# Patient Record
Sex: FEMALE | Race: WHITE | NOT HISPANIC OR LATINO | Employment: UNEMPLOYED | URBAN - METROPOLITAN AREA
[De-identification: names, ages, dates, MRNs, and addresses within clinical notes are randomized per-mention and may not be internally consistent; named-entity substitution may affect disease eponyms.]

---

## 2017-10-08 ENCOUNTER — HOSPITAL ENCOUNTER (EMERGENCY)
Facility: HOSPITAL | Age: 32
Discharge: HOME | End: 2017-10-08
Attending: EMERGENCY MEDICINE
Payer: MEDICAID

## 2017-10-08 VITALS
WEIGHT: 255 LBS | BODY MASS INDEX: 46.93 KG/M2 | SYSTOLIC BLOOD PRESSURE: 126 MMHG | TEMPERATURE: 98 F | OXYGEN SATURATION: 99 % | HEIGHT: 62 IN | DIASTOLIC BLOOD PRESSURE: 66 MMHG | RESPIRATION RATE: 20 BRPM | HEART RATE: 84 BPM

## 2017-10-08 DIAGNOSIS — K82.8 BILIARY DYSKINESIA: Primary | ICD-10-CM

## 2017-10-08 DIAGNOSIS — R10.11 RIGHT UPPER QUADRANT PAIN: ICD-10-CM

## 2017-10-08 LAB
ALBUMIN SERPL BCP-MCNC: 4.6 G/DL
ALP SERPL-CCNC: 79 U/L
ALT SERPL W/O P-5'-P-CCNC: 39 U/L
ANION GAP SERPL CALC-SCNC: 13 MMOL/L
AST SERPL-CCNC: 23 U/L
B-HCG UR QL: NEGATIVE
BACTERIA #/AREA URNS AUTO: ABNORMAL /HPF
BASOPHILS # BLD AUTO: 0.01 K/UL
BASOPHILS NFR BLD: 0.1 %
BILIRUB SERPL-MCNC: 1 MG/DL
BILIRUB UR QL STRIP: NEGATIVE
BUN SERPL-MCNC: 9 MG/DL
CALCIUM SERPL-MCNC: 9.6 MG/DL
CHLORIDE SERPL-SCNC: 105 MMOL/L
CLARITY UR REFRACT.AUTO: ABNORMAL
CO2 SERPL-SCNC: 24 MMOL/L
COLOR UR AUTO: ABNORMAL
CREAT SERPL-MCNC: 0.59 MG/DL
DIFFERENTIAL METHOD: ABNORMAL
EOSINOPHIL # BLD AUTO: 0.1 K/UL
EOSINOPHIL NFR BLD: 1.5 %
ERYTHROCYTE [DISTWIDTH] IN BLOOD BY AUTOMATED COUNT: 14.1 %
EST. GFR  (AFRICAN AMERICAN): >60 ML/MIN/1.73 M^2
EST. GFR  (NON AFRICAN AMERICAN): >60 ML/MIN/1.73 M^2
GLUCOSE SERPL-MCNC: 86 MG/DL
GLUCOSE UR QL STRIP: NEGATIVE
HCT VFR BLD AUTO: 43.5 %
HGB BLD-MCNC: 14.5 G/DL
HGB UR QL STRIP: NEGATIVE
KETONES UR QL STRIP: ABNORMAL
LEUKOCYTE ESTERASE UR QL STRIP: ABNORMAL
LIPASE SERPL-CCNC: 51 U/L
LYMPHOCYTES # BLD AUTO: 1.7 K/UL
LYMPHOCYTES NFR BLD: 17.4 %
MCH RBC QN AUTO: 30.2 PG
MCHC RBC AUTO-ENTMCNC: 33.3 G/DL
MCV RBC AUTO: 91 FL
MICROSCOPIC COMMENT: ABNORMAL
MONOCYTES # BLD AUTO: 0.8 K/UL
MONOCYTES NFR BLD: 8.6 %
NEUTROPHILS # BLD AUTO: 6.9 K/UL
NEUTROPHILS NFR BLD: 72.3 %
NITRITE UR QL STRIP: NEGATIVE
PH UR STRIP: 5 [PH] (ref 5–8)
PLATELET # BLD AUTO: 192 K/UL
PMV BLD AUTO: 12 FL
POTASSIUM SERPL-SCNC: 3.3 MMOL/L
PROT SERPL-MCNC: 8 G/DL
PROT UR QL STRIP: ABNORMAL
RBC # BLD AUTO: 4.8 M/UL
SODIUM SERPL-SCNC: 142 MMOL/L
SP GR UR STRIP: 1.02 (ref 1–1.03)
SQUAMOUS #/AREA URNS AUTO: 10 /HPF
URN SPEC COLLECT METH UR: ABNORMAL
UROBILINOGEN UR STRIP-ACNC: NEGATIVE EU/DL
WBC # BLD AUTO: 9.49 K/UL
WBC #/AREA URNS AUTO: 10 /HPF (ref 0–5)

## 2017-10-08 PROCEDURE — 96374 THER/PROPH/DIAG INJ IV PUSH: CPT

## 2017-10-08 PROCEDURE — 96375 TX/PRO/DX INJ NEW DRUG ADDON: CPT

## 2017-10-08 PROCEDURE — 25000003 PHARM REV CODE 250: Performed by: FAMILY MEDICINE

## 2017-10-08 PROCEDURE — 85025 COMPLETE CBC W/AUTO DIFF WBC: CPT

## 2017-10-08 PROCEDURE — 63600175 PHARM REV CODE 636 W HCPCS: Performed by: EMERGENCY MEDICINE

## 2017-10-08 PROCEDURE — 83690 ASSAY OF LIPASE: CPT

## 2017-10-08 PROCEDURE — 25000003 PHARM REV CODE 250: Performed by: EMERGENCY MEDICINE

## 2017-10-08 PROCEDURE — 99284 EMERGENCY DEPT VISIT MOD MDM: CPT | Mod: 25

## 2017-10-08 PROCEDURE — 96361 HYDRATE IV INFUSION ADD-ON: CPT

## 2017-10-08 PROCEDURE — 81000 URINALYSIS NONAUTO W/SCOPE: CPT

## 2017-10-08 PROCEDURE — 80053 COMPREHEN METABOLIC PANEL: CPT

## 2017-10-08 PROCEDURE — 81025 URINE PREGNANCY TEST: CPT

## 2017-10-08 RX ORDER — POTASSIUM CHLORIDE 20 MEQ/1
20 TABLET, EXTENDED RELEASE ORAL
Status: COMPLETED | OUTPATIENT
Start: 2017-10-08 | End: 2017-10-08

## 2017-10-08 RX ORDER — ONDANSETRON 2 MG/ML
4 INJECTION INTRAMUSCULAR; INTRAVENOUS
Status: COMPLETED | OUTPATIENT
Start: 2017-10-08 | End: 2017-10-08

## 2017-10-08 RX ORDER — TRAMADOL HYDROCHLORIDE 50 MG/1
50 TABLET ORAL EVERY 6 HOURS PRN
Qty: 12 TABLET | Refills: 0 | Status: SHIPPED | OUTPATIENT
Start: 2017-10-08 | End: 2017-10-18

## 2017-10-08 RX ORDER — OMEPRAZOLE 20 MG/1
20 CAPSULE, DELAYED RELEASE ORAL DAILY
Qty: 30 CAPSULE | Refills: 0 | Status: SHIPPED | OUTPATIENT
Start: 2017-10-08 | End: 2018-10-08

## 2017-10-08 RX ORDER — MORPHINE SULFATE 2 MG/ML
6 INJECTION, SOLUTION INTRAMUSCULAR; INTRAVENOUS
Status: COMPLETED | OUTPATIENT
Start: 2017-10-08 | End: 2017-10-08

## 2017-10-08 RX ADMIN — ONDANSETRON 4 MG: 2 INJECTION INTRAMUSCULAR; INTRAVENOUS at 06:10

## 2017-10-08 RX ADMIN — POTASSIUM CHLORIDE 20 MEQ: 20 TABLET, EXTENDED RELEASE ORAL at 08:10

## 2017-10-08 RX ADMIN — SODIUM CHLORIDE 1000 ML: 0.9 INJECTION, SOLUTION INTRAVENOUS at 06:10

## 2017-10-08 RX ADMIN — MORPHINE SULFATE 6 MG: 2 INJECTION, SOLUTION INTRAMUSCULAR; INTRAVENOUS at 06:10

## 2017-10-08 NOTE — ED PROVIDER NOTES
Encounter Date: 10/8/2017       History     Chief Complaint   Patient presents with    Flank Pain     Pt states started with abdominal pain since last pm, states now has pain to right flank that moves under right breast,  states unable to take deep breath.  Pt states + nausea and vomited x 1 PTA.      HPI   This is a 32 y.o. female who has no past medical history on file.   The patient presents to the Emergency Department with right sided abdominal pain.   Started this AM,  Gradual onset, constant, worsening. Pt cannot find comfortable position.  Pain radiates into lower ribs and right neck/shoulder pain.  Symptoms are associated with nausea/vomiting, .  Pt denies bloody urine, dysuria, hesitancy.   Symptoms are aggravated by breathing, sitting, leaning on the right side  Symptoms are relieved by nothing.   Patient has no prior history of similar symptoms.   Pt has a past surgical history that includes  section; Dilation and curettage of uterus; Tonsillectomy;   and Ganglion cyst excision.      Review of patient's allergies indicates:  No Known Allergies  History reviewed. No pertinent past medical history.  Past Surgical History:   Procedure Laterality Date     SECTION      DILATION AND CURETTAGE OF UTERUS      GANGLION CYST EXCISION      TONSILLECTOMY       Family History   Problem Relation Age of Onset    Diabetes Mother     Hypertension Mother     Depression Mother     Hypertension Father     Heart disease Father     Cancer Maternal Aunt     Diabetes Paternal Grandfather      Social History   Substance Use Topics    Smoking status: Current Some Day Smoker     Types: Cigarettes    Smokeless tobacco: Never Used      Comment: occ    Alcohol use Yes      Comment: occ     Review of Systems   Constitutional: Negative for fever.   HENT: Negative for sore throat.    Respiratory: Negative for shortness of breath.    Cardiovascular: Negative for chest pain.   Gastrointestinal: Negative for  nausea.   Genitourinary: Negative for dysuria.   Musculoskeletal: Negative for back pain.   Skin: Negative for rash.   Neurological: Negative for weakness.   Hematological: Does not bruise/bleed easily.       Physical Exam     Initial Vitals [10/08/17 1712]   BP Pulse Resp Temp SpO2   122/68 104 20 97.8 °F (36.6 °C) 100 %      MAP       86         Physical Exam    Nursing note and vitals reviewed.  Constitutional: She appears well-developed and well-nourished. She is not diaphoretic. She appears distressed (pain, anxious).   HENT:   Head: Normocephalic and atraumatic.   Mouth/Throat: Oropharynx is clear and moist.   Eyes: Conjunctivae are normal. Pupils are equal, round, and reactive to light.   Neck: Neck supple.   Cardiovascular: Normal rate, regular rhythm and intact distal pulses.   No murmur heard.  Pulmonary/Chest: Breath sounds normal. No respiratory distress. She has no wheezes. She has no rhonchi. She has no rales.   Abdominal: Soft. Bowel sounds are normal. She exhibits no distension. There is tenderness (RUQ). There is no guarding.   obese   Musculoskeletal: Normal range of motion. She exhibits no edema or tenderness.   Neurological: She is alert and oriented to person, place, and time.   Skin: Skin is warm and dry. Capillary refill takes less than 2 seconds. No rash noted.   Psychiatric:   Anxious         ED Course   Procedures  Labs Reviewed   URINALYSIS - Abnormal; Notable for the following:        Result Value    Appearance, UA Hazy (*)     Protein, UA Trace (*)     Ketones, UA 1+ (*)     Leukocytes, UA 1+ (*)     All other components within normal limits   CBC W/ AUTO DIFFERENTIAL - Abnormal; Notable for the following:     Lymph% 17.4 (*)     All other components within normal limits   COMPREHENSIVE METABOLIC PANEL - Abnormal; Notable for the following:     Potassium 3.3 (*)     All other components within normal limits   URINALYSIS MICROSCOPIC - Abnormal; Notable for the following:     WBC, UA 10  (*)     Bacteria, UA Many (*)     All other components within normal limits   PREGNANCY TEST, URINE RAPID   LIPASE             Medical Decision Making:   Initial Assessment:   This is an emergent evaluation of a 32 y.o.female patient with presentation of RUQ pain.   Initial differentials include but are not limited to: biliary colic, cholecystitis, ulcer, other intra-abd infection.   Plan: cbc, cmp, lipase, US RUQ, pain control, nausea meds, IVF    Clinical Tests:   Lab Tests: Ordered and Reviewed  Radiological Study: Ordered and Reviewed              Attending Attestation:             Attending ED Notes:   Pt was re-evaluated and turned over to Dr. Jim at 6pm in stable condition. Pt is pending labs, imaging and disposition.           ED Course      Clinical Impression:     1. Biliary dyskinesia    2. Right upper quadrant pain                                 Virgil Rendon MD  10/09/17 3736

## 2018-10-04 ENCOUNTER — OUTSIDE PLACE OF SERVICE (OUTPATIENT)
Dept: CARDIOLOGY | Facility: CLINIC | Age: 33
End: 2018-10-04
Payer: MEDICAID

## 2018-10-04 PROCEDURE — 93010 ELECTROCARDIOGRAM REPORT: CPT | Mod: S$GLB,,, | Performed by: INTERNAL MEDICINE

## 2018-10-23 ENCOUNTER — HOSPITAL ENCOUNTER (EMERGENCY)
Facility: HOSPITAL | Age: 33
Discharge: HOME | End: 2018-10-23
Attending: SURGERY
Payer: MEDICAID

## 2018-10-23 VITALS
HEART RATE: 83 BPM | TEMPERATURE: 98 F | DIASTOLIC BLOOD PRESSURE: 76 MMHG | OXYGEN SATURATION: 99 % | SYSTOLIC BLOOD PRESSURE: 130 MMHG | RESPIRATION RATE: 18 BRPM

## 2018-10-23 DIAGNOSIS — R55 SYNCOPE: ICD-10-CM

## 2018-10-23 DIAGNOSIS — R00.2 PALPITATIONS: Primary | ICD-10-CM

## 2018-10-23 DIAGNOSIS — R07.9 CHEST PAIN: ICD-10-CM

## 2018-10-23 LAB
ALBUMIN SERPL BCP-MCNC: 4.4 G/DL
ALP SERPL-CCNC: 76 U/L
ALT SERPL W/O P-5'-P-CCNC: 23 U/L
AMPHET+METHAMPHET UR QL: NEGATIVE
ANION GAP SERPL CALC-SCNC: 11 MMOL/L
AST SERPL-CCNC: 21 U/L
B-HCG UR QL: NEGATIVE
BARBITURATES UR QL SCN>200 NG/ML: NEGATIVE
BASOPHILS # BLD AUTO: 0 K/UL
BASOPHILS NFR BLD: 0 %
BENZODIAZ UR QL SCN>200 NG/ML: NEGATIVE
BILIRUB SERPL-MCNC: 0.3 MG/DL
BILIRUB UR QL STRIP: NEGATIVE
BUN SERPL-MCNC: 17 MG/DL
BZE UR QL SCN: NEGATIVE
CALCIUM SERPL-MCNC: 9.9 MG/DL
CANNABINOIDS UR QL SCN: NEGATIVE
CHLORIDE SERPL-SCNC: 107 MMOL/L
CLARITY UR REFRACT.AUTO: CLEAR
CO2 SERPL-SCNC: 21 MMOL/L
COLOR UR AUTO: YELLOW
CREAT SERPL-MCNC: 0.6 MG/DL
CREAT UR-MCNC: 95.2 MG/DL
DIFFERENTIAL METHOD: ABNORMAL
EOSINOPHIL # BLD AUTO: 0 K/UL
EOSINOPHIL NFR BLD: 0.1 %
ERYTHROCYTE [DISTWIDTH] IN BLOOD BY AUTOMATED COUNT: 12.9 %
EST. GFR  (AFRICAN AMERICAN): >60 ML/MIN/1.73 M^2
EST. GFR  (NON AFRICAN AMERICAN): >60 ML/MIN/1.73 M^2
GLUCOSE SERPL-MCNC: 124 MG/DL
GLUCOSE UR QL STRIP: NEGATIVE
HCT VFR BLD AUTO: 43.7 %
HGB BLD-MCNC: 14.7 G/DL
HGB UR QL STRIP: NEGATIVE
KETONES UR QL STRIP: NEGATIVE
LEUKOCYTE ESTERASE UR QL STRIP: NEGATIVE
LYMPHOCYTES # BLD AUTO: 0.8 K/UL
LYMPHOCYTES NFR BLD: 7.8 %
MCH RBC QN AUTO: 30.2 PG
MCHC RBC AUTO-ENTMCNC: 33.6 G/DL
MCV RBC AUTO: 90 FL
METHADONE UR QL SCN>300 NG/ML: NEGATIVE
MONOCYTES # BLD AUTO: 0.1 K/UL
MONOCYTES NFR BLD: 1.3 %
NEUTROPHILS # BLD AUTO: 9.1 K/UL
NEUTROPHILS NFR BLD: 90.6 %
NITRITE UR QL STRIP: NEGATIVE
NT-PROBNP: 40 PG/ML
OPIATES UR QL SCN: NORMAL
PCP UR QL SCN>25 NG/ML: NEGATIVE
PH UR STRIP: 7 [PH] (ref 5–8)
PLATELET # BLD AUTO: 230 K/UL
PMV BLD AUTO: 11.9 FL
POTASSIUM SERPL-SCNC: 3.9 MMOL/L
PROT SERPL-MCNC: 7.7 G/DL
PROT UR QL STRIP: NEGATIVE
RBC # BLD AUTO: 4.86 M/UL
SODIUM SERPL-SCNC: 139 MMOL/L
SP GR UR STRIP: 1.01 (ref 1–1.03)
TOXICOLOGY INFORMATION: NORMAL
TROPONIN I SERPL DL<=0.01 NG/ML-MCNC: <0.012 NG/ML
URN SPEC COLLECT METH UR: NORMAL
UROBILINOGEN UR STRIP-ACNC: NEGATIVE EU/DL
WBC # BLD AUTO: 10.06 K/UL

## 2018-10-23 PROCEDURE — 99285 EMERGENCY DEPT VISIT HI MDM: CPT | Mod: 25

## 2018-10-23 PROCEDURE — 84484 ASSAY OF TROPONIN QUANT: CPT

## 2018-10-23 PROCEDURE — 85025 COMPLETE CBC W/AUTO DIFF WBC: CPT

## 2018-10-23 PROCEDURE — 93005 ELECTROCARDIOGRAM TRACING: CPT

## 2018-10-23 PROCEDURE — 80307 DRUG TEST PRSMV CHEM ANLYZR: CPT

## 2018-10-23 PROCEDURE — 63600175 PHARM REV CODE 636 W HCPCS: Performed by: PHYSICIAN ASSISTANT

## 2018-10-23 PROCEDURE — 81025 URINE PREGNANCY TEST: CPT

## 2018-10-23 PROCEDURE — 96375 TX/PRO/DX INJ NEW DRUG ADDON: CPT

## 2018-10-23 PROCEDURE — 81003 URINALYSIS AUTO W/O SCOPE: CPT | Mod: 59

## 2018-10-23 PROCEDURE — 80053 COMPREHEN METABOLIC PANEL: CPT

## 2018-10-23 PROCEDURE — 93010 ELECTROCARDIOGRAM REPORT: CPT | Mod: ,,, | Performed by: INTERNAL MEDICINE

## 2018-10-23 PROCEDURE — 96374 THER/PROPH/DIAG INJ IV PUSH: CPT

## 2018-10-23 PROCEDURE — 83880 ASSAY OF NATRIURETIC PEPTIDE: CPT

## 2018-10-23 RX ORDER — KETOROLAC TROMETHAMINE 30 MG/ML
15 INJECTION, SOLUTION INTRAMUSCULAR; INTRAVENOUS
Status: COMPLETED | OUTPATIENT
Start: 2018-10-23 | End: 2018-10-23

## 2018-10-23 RX ORDER — DIPHENHYDRAMINE HYDROCHLORIDE 50 MG/ML
25 INJECTION INTRAMUSCULAR; INTRAVENOUS
Status: COMPLETED | OUTPATIENT
Start: 2018-10-23 | End: 2018-10-23

## 2018-10-23 RX ORDER — BUTALBITAL, ACETAMINOPHEN AND CAFFEINE 50; 325; 40 MG/1; MG/1; MG/1
1 TABLET ORAL EVERY 4 HOURS PRN
Qty: 15 TABLET | Refills: 0 | Status: SHIPPED | OUTPATIENT
Start: 2018-10-23 | End: 2018-11-22

## 2018-10-23 RX ORDER — HYDROCODONE BITARTRATE AND ACETAMINOPHEN 5; 325 MG/1; MG/1
1 TABLET ORAL EVERY 6 HOURS PRN
COMMUNITY
End: 2019-02-26

## 2018-10-23 RX ORDER — ONDANSETRON 2 MG/ML
4 INJECTION INTRAMUSCULAR; INTRAVENOUS
Status: COMPLETED | OUTPATIENT
Start: 2018-10-23 | End: 2018-10-23

## 2018-10-23 RX ORDER — PROCHLORPERAZINE EDISYLATE 5 MG/ML
10 INJECTION INTRAMUSCULAR; INTRAVENOUS
Status: COMPLETED | OUTPATIENT
Start: 2018-10-23 | End: 2018-10-23

## 2018-10-23 RX ADMIN — ONDANSETRON 4 MG: 2 INJECTION INTRAMUSCULAR; INTRAVENOUS at 07:10

## 2018-10-23 RX ADMIN — KETOROLAC TROMETHAMINE 15 MG: 30 INJECTION, SOLUTION INTRAMUSCULAR; INTRAVENOUS at 08:10

## 2018-10-23 RX ADMIN — PROCHLORPERAZINE EDISYLATE 10 MG: 5 INJECTION INTRAMUSCULAR; INTRAVENOUS at 09:10

## 2018-10-23 RX ADMIN — DIPHENHYDRAMINE HYDROCHLORIDE 25 MG: 50 INJECTION, SOLUTION INTRAMUSCULAR; INTRAVENOUS at 09:10

## 2018-10-23 NOTE — ED PROVIDER NOTES
"Encounter Date: 10/23/2018       History     Chief Complaint   Patient presents with    Fall     I have been having a migraine off and on I have been having a lot of stress. I fed my kid then didnt feel well so went to take a shower then fell and woke up on the ground. I dont know if i hit my head or not. I feel like off and on my heart is racing. I did take a norco before it happen also bc i had therapy on my sholuder today.        June López, a 33 y.o. female that presents to the ED for syncopal episode that happened earlier this afternoon.  Patient states that she was not feeling well, had a headache with some associated nausea and 1 episode of vomiting today.  She states that this is her normal pattern for her headache.  When she got out of the shower she felt dizzy and then "came to" sitting on the floor in the bathroom.  She was initially disoriented and did not know where she was.  No previous h/o syncopal episodes.  She has a second c/o of CP that started about 3 days ago with palpitations.  Pain is to the center of her chest with no radiation.  Exacerbated by deep inspiration.  Treatments tried for the CP include a norco taken today with little improvement.             The history is provided by the patient.     Review of patient's allergies indicates:  No Known Allergies  Past Medical History:   Diagnosis Date    Vertigo      Past Surgical History:   Procedure Laterality Date     SECTION      DILATION AND CURETTAGE OF UTERUS      GANGLION CYST EXCISION      TONSILLECTOMY       Family History   Problem Relation Age of Onset    Diabetes Mother     Hypertension Mother     Depression Mother     Hypertension Father     Heart disease Father     Cancer Maternal Aunt     Diabetes Paternal Grandfather      Social History     Tobacco Use    Smoking status: Current Some Day Smoker     Types: Cigarettes    Smokeless tobacco: Never Used    Tobacco comment: occ   Substance Use Topics    " Alcohol use: Yes     Comment: occ    Drug use: No     Review of Systems   Constitutional: Negative for fever.   Respiratory: Negative for cough and shortness of breath.    Cardiovascular: Positive for chest pain and palpitations.   Gastrointestinal: Positive for nausea (resolved ) and vomiting (x1). Negative for abdominal pain.   Skin: Negative for color change.   Neurological: Positive for dizziness, syncope and headaches.   All other systems reviewed and are negative.      Physical Exam     Initial Vitals [10/23/18 1758]   BP Pulse Resp Temp SpO2   117/70 110 20 97.8 °F (36.6 °C) 98 %      MAP       --         Physical Exam    Nursing note and vitals reviewed.  Constitutional: She appears well-developed and well-nourished.   HENT:   Head: Normocephalic and atraumatic.   Right Ear: External ear normal.   Left Ear: External ear normal.   Nose: Nose normal.   Eyes: Conjunctivae and EOM are normal.   Neck: Normal range of motion.   Cardiovascular: Regular rhythm and normal heart sounds. Tachycardia present.    Pulmonary/Chest: Breath sounds normal. No respiratory distress. She has no wheezes. She has no rhonchi. She has no rales.   Abdominal: Soft. Bowel sounds are normal. She exhibits no distension. There is no tenderness. There is no rebound and no guarding.   Musculoskeletal: Normal range of motion.   Neurological: She is alert and oriented to person, place, and time. No cranial nerve deficit or sensory deficit.   Skin: Skin is warm and dry. Capillary refill takes less than 2 seconds.   Psychiatric: She has a normal mood and affect. Thought content normal.         ED Course   Procedures  Labs Reviewed   CBC W/ AUTO DIFFERENTIAL - Abnormal; Notable for the following components:       Result Value    Gran # (ANC) 9.1 (*)     Lymph # 0.8 (*)     Mono # 0.1 (*)     Gran% 90.6 (*)     Lymph% 7.8 (*)     Mono% 1.3 (*)     All other components within normal limits   COMPREHENSIVE METABOLIC PANEL - Abnormal; Notable for  the following components:    CO2 21 (*)     Glucose 124 (*)     All other components within normal limits   TROPONIN I   URINALYSIS, REFLEX TO URINE CULTURE    Narrative:     Preferred Collection Type->Urine, Clean Catch   DRUG SCREEN PANEL, URINE EMERGENCY   NT-PRO NATRIURETIC PEPTIDE   PREGNANCY TEST, URINE RAPID   PREGNANCY TEST, URINE RAPID    Narrative:     Preferred Collection Type->Urine, Clean Catch          Imaging Results          CT Head Without Contrast (Final result)  Result time 10/23/18 20:04:18    Final result by Darell Cordero MD (10/23/18 20:04:18)                 Impression:      No acute abnormality.    All CT scans at this facility use dose modulation, iterative reconstruction, and/or weight based dosing when appropriate to reduce radiation dose to as low as reasonable achievable.      Electronically signed by: Darell Cordero MD  Date:    10/23/2018  Time:    20:04             Narrative:    EXAMINATION:  CT HEAD WITHOUT CONTRAST    CLINICAL HISTORY:  syncope;    TECHNIQUE:  Low dose axial CT images obtained throughout the head without intravenous contrast. Sagittal and coronal reconstructions were performed.    All CT scans at this facility use dose modulation, iterative reconstruction, and/or weight based dosing when appropriate to reduce radiation dose to as low as reasonable achievable.    COMPARISON:  None.    FINDINGS:  Intracranial compartment:    The brain parenchyma appears normal. No parenchymal mass, hemorrhage, edema or major vascular distribution infarct.    Ventricles and sulci are normal in size for age without evidence of hydrocephalus.    No extra-axial blood or fluid collections.    Skull/extracranial contents (limited evaluation): No fracture. Mastoid air cells and paranasal sinuses are essentially clear.                               X-Ray Chest AP Portable (Final result)  Result time 10/23/18 20:04:32    Final result by Darell Cordero MD (10/23/18 20:04:32)                  Impression:      No acute process seen.      Electronically signed by: Darell Cordero MD  Date:    10/23/2018  Time:    20:04             Narrative:    EXAMINATION:  XR CHEST AP PORTABLE    CLINICAL HISTORY:  Chest pain, unspecified    FINDINGS:  Single view of the chest.    Cardiac silhouette is normal.  The lungs demonstrate no evidence of active disease.  No evidence of pleural effusion or pneumothorax.  Bones appear intact.                                 Medical Decision Making:   Initial Assessment:   Chest pain, palpitations, syncope, headache  Differential Diagnosis:   Electrolyte abnormality, vasovagal response, ACS, ICH/SDH, migraine   Clinical Tests:   Lab Tests: Ordered and Reviewed  The following lab test(s) were unremarkable: CBC, CMP, Troponin, BNP and Urinalysis  Radiological Study: Ordered and Reviewed  ED Management:  Patient presents to the ED for evaluation of possible syncopal episode with palpitations and chest pain. CT shows no acute abnormality.  CBC, CMP, troponin, BMP, UA showed no acute abnormalities.  Toradol was given with little improvement of her symptoms.  Compazine and Benadryl were given with improvement of her headache.  Patient was instructed follow up with her PCP for further evaluation was given a prescription for Fioricet.  Given strict return precautions and verbalized understanding.                        Clinical Impression:   The primary encounter diagnosis was Palpitations. Diagnoses of Syncope and Chest pain were also pertinent to this visit.                             Haley Childress PA-C  10/23/18 5130

## 2019-01-05 DIAGNOSIS — M25.511 RIGHT SHOULDER PAIN, UNSPECIFIED CHRONICITY: Primary | ICD-10-CM

## 2019-01-07 ENCOUNTER — OFFICE VISIT (OUTPATIENT)
Dept: ORTHOPEDICS | Facility: CLINIC | Age: 34
End: 2019-01-07
Payer: COMMERCIAL

## 2019-01-07 VITALS — HEIGHT: 62 IN | WEIGHT: 255 LBS | BODY MASS INDEX: 46.93 KG/M2

## 2019-01-07 DIAGNOSIS — M25.511 CHRONIC RIGHT SHOULDER PAIN: Primary | ICD-10-CM

## 2019-01-07 DIAGNOSIS — M79.2 NEUROPATHIC PAIN, ARM: ICD-10-CM

## 2019-01-07 DIAGNOSIS — R20.8 HYPERALGESIA: ICD-10-CM

## 2019-01-07 DIAGNOSIS — G89.29 CHRONIC RIGHT SHOULDER PAIN: Primary | ICD-10-CM

## 2019-01-07 PROCEDURE — 99204 OFFICE O/P NEW MOD 45 MIN: CPT | Mod: S$GLB,,, | Performed by: ORTHOPAEDIC SURGERY

## 2019-01-07 PROCEDURE — 99999 PR PBB SHADOW E&M-EST. PATIENT-LVL III: ICD-10-PCS | Mod: PBBFAC,,, | Performed by: ORTHOPAEDIC SURGERY

## 2019-01-07 PROCEDURE — 99999 PR PBB SHADOW E&M-EST. PATIENT-LVL III: CPT | Mod: PBBFAC,,, | Performed by: ORTHOPAEDIC SURGERY

## 2019-01-07 PROCEDURE — 99204 PR OFFICE/OUTPT VISIT, NEW, LEVL IV, 45-59 MIN: ICD-10-PCS | Mod: S$GLB,,, | Performed by: ORTHOPAEDIC SURGERY

## 2019-01-07 PROCEDURE — 99213 OFFICE O/P EST LOW 20 MIN: CPT | Mod: PBBFAC,PN | Performed by: ORTHOPAEDIC SURGERY

## 2019-01-07 RX ORDER — TRAZODONE HYDROCHLORIDE 150 MG/1
75 TABLET ORAL NIGHTLY
COMMUNITY
End: 2019-12-26

## 2019-01-07 RX ORDER — OMEPRAZOLE 40 MG/1
40 CAPSULE, DELAYED RELEASE ORAL DAILY
Status: ON HOLD | COMMUNITY
End: 2020-01-30 | Stop reason: HOSPADM

## 2019-01-07 RX ORDER — ESCITALOPRAM OXALATE 10 MG/1
30 TABLET ORAL DAILY
COMMUNITY
End: 2019-12-26

## 2019-01-07 RX ORDER — PREDNISONE 10 MG/1
TABLET ORAL
Qty: 15 TABLET | Refills: 0 | Status: SHIPPED | OUTPATIENT
Start: 2019-01-07 | End: 2019-02-26 | Stop reason: ALTCHOICE

## 2019-01-07 RX ORDER — SPIRONOLACTONE 100 MG/1
100 TABLET, FILM COATED ORAL 2 TIMES DAILY
COMMUNITY
End: 2019-09-05 | Stop reason: ALTCHOICE

## 2019-01-07 RX ORDER — ROPINIROLE 1 MG/1
1 TABLET, FILM COATED ORAL NIGHTLY
COMMUNITY
End: 2019-12-26 | Stop reason: ALTCHOICE

## 2019-01-07 NOTE — PROGRESS NOTES
CC: right shoulder pain    33 y.o. Female presents today for evaluation of her right shoulder pain.   How long: Patient states she has had shoulder pain for years now. She states that in August it started bothering her more. She finally went to see Sports Med in October. She was sent to PT. Patient states her pain is shooting and burning. It is in her entire shoulder and arm. Her pain has been getting worse and now she is noticing it radiating to her left upper back as well.  She denies any recent or remote injuries or trauma that could have started her pain.  What makes it better: Patient states she feels better with dry needling and E-stim. This helped her more so in the beginning, but improvement is short-lived.  She also noticed great improvement when she had her arm in a sling as this provided her with more support, but when she removes the sling her pain was severe due to the arm hanging further.  What makes it worse: Patient states she is in constant pain. She admits to her  massaging and working out her shoulder and upper back and arm every evening.  Does it radiate: Patient reports radiating pain from the top of her shoulder which will shoot into her neck and elbow. She states is will sometimes shoot down to her fingers and make them go numb.   Attempted treatments: Patient previously tried physical therapy but states it did not help.  She feels like PT made it worse.  Patient tried dry needling which was helping initially but it is no longer helping. She bought an at home E-Stim machine but she states this is not helping her at all. Patient states she will heat but it doesn't help her. Patient was given Norco and Trazodone. She states the Norco did help but has since stopped helping and started giving her chest pains so she stopped taking it. She is taking Trazodone at night which helps her sleep but she still wakes up due to pain. Patient was taking Naproxen 500 mg but states this does not help her  at all.   Pain score: Patient states her pain is 10/10 today.   Any mechanical symptoms: Patient states her shoulder will pop and click with motion.   Feelings of instability: Patient states her shoulder has given out on her twice. She states she was picking up a soda and dropped it because her arm gave out.   Affecting ADLs: Patient is having a hard time getting a full night sleep and has to readjust and prop up on pillows at night.  She states that she needs to sleep on her left side in a very specific way in order to fall asleep and staying asleep.      REVIEW OF SYSTEMS:   Constitution: Patient denies fever, chills, night sweats, and weight changes.  Eyes: Patient denies eye pain or vision change.  HENT: Patient denies any headache, ear pain, sore throat, or nasal discharge.  CVS: Patient denies chest pain.  Lungs: Patient denies any shortness of breath or cough.  Abd: Patient denies any stomach pain, nausea, or vomiting.  Skin: Patient denies any skin rash or itching.    Hematologic/Lymphatic: Patient denies any bleeding problems, or easy bruising.   Musculoskeletal: Patient denies any recent falls. See HPI.  Psych: Patient denies any current anxiety or nervousness.    PAST MEDICAL HISTORY:   Past Medical History:   Diagnosis Date    Vertigo        PAST SURGICAL HISTORY:   Past Surgical History:   Procedure Laterality Date     SECTION      DILATION AND CURETTAGE OF UTERUS      GANGLION CYST EXCISION      TONSILLECTOMY         FAMILY HISTORY:   Family History   Problem Relation Age of Onset    Diabetes Mother     Hypertension Mother     Depression Mother     Hypertension Father     Heart disease Father     Cancer Maternal Aunt     Diabetes Paternal Grandfather        SOCIAL HISTORY:   Social History     Socioeconomic History    Marital status:      Spouse name: Not on file    Number of children: Not on file    Years of education: Not on file    Highest education level: Not on file  "  Social Needs    Financial resource strain: Not on file    Food insecurity - worry: Not on file    Food insecurity - inability: Not on file    Transportation needs - medical: Not on file    Transportation needs - non-medical: Not on file   Occupational History    Not on file   Tobacco Use    Smoking status: Current Some Day Smoker     Types: Cigarettes    Smokeless tobacco: Never Used    Tobacco comment: occ   Substance and Sexual Activity    Alcohol use: Yes     Comment: occ    Drug use: No    Sexual activity: Yes     Partners: Male     Birth control/protection: None   Other Topics Concern    Not on file   Social History Narrative    Not on file       MEDICATIONS:     Current Outpatient Medications:     HYDROcodone-acetaminophen (NORCO) 5-325 mg per tablet, Take 1 tablet by mouth every 6 (six) hours as needed for Pain., Disp: , Rfl:     omeprazole (PRILOSEC) 20 MG capsule, Take 1 capsule (20 mg total) by mouth once daily., Disp: 30 capsule, Rfl: 0    ALLERGIES:   Review of patient's allergies indicates:  No Known Allergies     PHYSICAL EXAMINATION:  Ht 5' 2" (1.575 m)   Wt 115.7 kg (255 lb)   BMI 46.64 kg/m²   Vitals signs and nursing note have been reviewed.  General: In no acute distress, well developed, well nourished, no diaphoresis  Eyes: EOM full and smooth, no eye redness or discharge  HENT: normocephalic and atraumatic, neck supple, trachea midline, no nasal discharge, no external ear redness or discharge  Cardiovascular: 2+ and symmetric radial bilaterally, no LE edema  Lungs: respirations non-labored, no conversational dyspnea   Abd: non-distended, no rigidity  MSK: no amputation or deformity, no swelling of extremities  Neuro: AAOx3, CN2-12 grossly intact  Skin: No rashes, warm and dry  Psychiatric: cooperative, pleasant, mood and affect appropriate for age    SHOULDER: RIGHT  The affected shoulder is compared to the contralateral shoulder.    Observation:    CERVICAL SPINE  Anterior " head carriage. Mild thoracic kyphosis.  Full AROM in flexion, extension, sidebending, and rotation.    SHOULDER  No ecchymosis, edema, or erythema throughout the shoulder girdle.  No sternal, clavicular, or acromial deformities bilaterally.  No atrophy of the pectorals, deltoids, supraspinatus, infraspinatus, or biceps bilaterally.  No asymmetry of shoulders bilaterally.    ROM:  Active flexion to 180° on left and 75° on right.   Active abduction to 180° on left and 90° on right.    Active internal rotation to T7 on left and lumbar spine on right.    Active external rotation to T4 on left and top of head on right.    No scapular dyskinesia or winging.  All motion on right causes pain    Tenderness:  Tenderness at the SC or AC joint  Tenderness  over the clavicle   Tenderness over biceps tendon or bicipital groove  Tenderness over subacromial space    Strength Testing:  Unable to accurately assess strength on the right hand side due to high level of pain.  No strength deficits on the left.    Special Tests:  Examination of the right shoulder is very limited due to patient's severe pain with minor motion and palpation.  Unable to accurately assess rotator cuff integrity.    Neurovascular Exam:  2+ radial pulses BL  Sensation intact to light touch in the distal median, radial, and ulnar nerve distributions bilaterally.  Capillary refill intact <2 seconds in all digits bilaterally      IMAGIN. X-ray ordered due to right shoulder pain.  2. X-ray images were reviewed personally by me and then directly with patient.  3. 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   4. IMPRESSION:  No arthritic changes of the right shoulder.  Questionable clavicular osteolysis is mentioned in the report.       ASSESSMENT:      ICD-10-CM ICD-9-CM   1. Chronic right shoulder pain M25.511 719.41    G89.29 338.29   2.  Neuropathic pain, arm - right G56.90 723.4   3. Hyperalgesia R20.8 782.0       PLAN:  1-3.  Chronic right shoulder pain/neuropathic arm pain/hyperalgesia -     - Jordana admits to this pain for the past several years and is only recently sought medical treatment for this.  She had seen a prior sports medicine physician who sent her to physical therapy.  She states that at therapy some modalities including dry needling and e-stim were helping at first but these are no longer helping her.  She has tried multiple medications including NSAIDs and Norco but denies is helping her pain any more.  She admits to disposing of her Norco as she did not feel good while taking it.    - XRs ordered in the office today and images were personally reviewed with the patient. See above for further detail.    - My physical examination was very limited today due to her high level of pain and inability to tolerate certain motions or gentle palpation.  Many of her symptoms and responses to treatment thus far are consistent with neuropathic pain. To better assess this cause of her pain, we discussed advanced imaging options.  I do think that an MRI of the right shoulder would be warranted to better assess the surrounding soft tissue structures, especially as she has failed medication therapy and physical therapy. In addition her x-ray results from today suggest that there may be an osteolysis finding of the distal clavicle and MRI would help to rule in or rule out this potential cause for her pain. MRI of the right shoulder has been ordered and scheduled.      - At this time I recommended that we do a short course of prednisone 30 mg daily.  This treatment is potentially diagnostic as well as therapeutic in that most neuropathic pains will respond well to steroids.  I counseled on possible side effects, and she states that she is aware of these because she has been on short course of steroids for illnesses in the recent past.  Prednisone has  been sent to her pharmacy.    - We discussed osteopathic manipulation as a potential treatment option for her as well. I did advise that it is a good idea to better control her pain prior to a treatment and she is agreeable to this.  We will see her back in a week's time, and I am hoping that the short course of prednisone will help to knock her pain down so that she may be able to tolerate the treatment better. From her description of her pain as well as her body language, fascial distortion model may be a good treatment choice for her.      Future planning includes - obtain MRI right shoulder now.  Consider OMT at next visit if symptoms are improved from prednisone.  Other future planning includes cervical spine evaluation including x-ray and MRI, possibly more PT.    All questions were answered to the best of my ability and all concerns were addressed at this time.    Follow up in 1 week for above, or sooner if needed.

## 2019-01-17 ENCOUNTER — HOSPITAL ENCOUNTER (OUTPATIENT)
Dept: RADIOLOGY | Facility: HOSPITAL | Age: 34
Discharge: HOME | End: 2019-01-17
Attending: ORTHOPAEDIC SURGERY
Payer: COMMERCIAL

## 2019-01-17 DIAGNOSIS — G89.29 CHRONIC RIGHT SHOULDER PAIN: ICD-10-CM

## 2019-01-17 DIAGNOSIS — M25.511 CHRONIC RIGHT SHOULDER PAIN: ICD-10-CM

## 2019-01-17 PROCEDURE — 73221 MRI JOINT UPR EXTREM W/O DYE: CPT | Mod: TC,PO,RT,ER

## 2019-01-21 ENCOUNTER — OFFICE VISIT (OUTPATIENT)
Dept: ORTHOPEDICS | Facility: CLINIC | Age: 34
End: 2019-01-21
Payer: COMMERCIAL

## 2019-01-21 VITALS — HEIGHT: 62 IN | WEIGHT: 255 LBS | BODY MASS INDEX: 46.93 KG/M2

## 2019-01-21 DIAGNOSIS — M75.111 INCOMPLETE TEAR OF RIGHT ROTATOR CUFF: ICD-10-CM

## 2019-01-21 DIAGNOSIS — G89.29 CHRONIC RIGHT SHOULDER PAIN: Primary | ICD-10-CM

## 2019-01-21 DIAGNOSIS — R20.8 HYPERALGESIA: ICD-10-CM

## 2019-01-21 DIAGNOSIS — M25.511 CHRONIC RIGHT SHOULDER PAIN: Primary | ICD-10-CM

## 2019-01-21 DIAGNOSIS — M25.411 EFFUSION OF RIGHT SHOULDER: ICD-10-CM

## 2019-01-21 PROCEDURE — 99999 PR PBB SHADOW E&M-EST. PATIENT-LVL III: CPT | Mod: PBBFAC,,, | Performed by: ORTHOPAEDIC SURGERY

## 2019-01-21 PROCEDURE — 99214 PR OFFICE/OUTPT VISIT, EST, LEVL IV, 30-39 MIN: ICD-10-PCS | Mod: S$GLB,,, | Performed by: ORTHOPAEDIC SURGERY

## 2019-01-21 PROCEDURE — 99999 PR PBB SHADOW E&M-EST. PATIENT-LVL III: ICD-10-PCS | Mod: PBBFAC,,, | Performed by: ORTHOPAEDIC SURGERY

## 2019-01-21 PROCEDURE — 99214 OFFICE O/P EST MOD 30 MIN: CPT | Mod: S$GLB,,, | Performed by: ORTHOPAEDIC SURGERY

## 2019-01-21 PROCEDURE — 99213 OFFICE O/P EST LOW 20 MIN: CPT | Mod: PBBFAC,PN | Performed by: ORTHOPAEDIC SURGERY

## 2019-01-21 NOTE — PROGRESS NOTES
CC: right shoulder pain    33 y.o. Female presents today for follow up evaluation of her right shoulder pain. She is here for her MRI results.   Attempted treatments: Pt states she took the prednisone as prescribed and there was no relief of her pain. If anything, she admits to decreased sleep and worse temperature regulation.  She admits to any activity or motion of her right are making her pain worse.  She denies anything right now that is making her pain feel better.  She is still trying massage from her  but denies much improvement with it. She admits to pain radiating down the front and side of her upper arm.  Pain score: Pt states her pain is a 10/10 and is worsening.  She states that her  has noticed that her pain is getting worse as well and she is doing less and less with her right arm.  History of trauma/injury: Pt denies any recent or remote trauma.  Affecting ADLs: Yes- Pt states the pain is constant, severe, and worsening.  She is noticing pain with simple lifting activities and is using her left arm more and more.     REVIEW OF SYSTEMS:   Constitution: Patient denies fever or chills.  Eyes: Patient denies eye pain or vision changes.  CVS: Patient denies chest pain.  Lungs: Patient denies shortness of breath or cough.  Skin: Patient denies skin rash or itching.    Musculoskeletal: Patient denies recent falls. See HPI.  Psych: Patient denies any current anxiety or nervousness.    PAST MEDICAL HISTORY:   Past Medical History:   Diagnosis Date    Vertigo      Family History   Problem Relation Age of Onset    Diabetes Mother     Hypertension Mother     Depression Mother     Hypertension Father     Heart disease Father     Cancer Maternal Aunt     Diabetes Paternal Grandfather          Social History     Socioeconomic History    Marital status:      Spouse name: Not on file    Number of children: Not on file    Years of education: Not on file    Highest education level: Not  "on file   Social Needs    Financial resource strain: Not on file    Food insecurity - worry: Not on file    Food insecurity - inability: Not on file    Transportation needs - medical: Not on file    Transportation needs - non-medical: Not on file   Occupational History    Not on file   Tobacco Use    Smoking status: Current Some Day Smoker     Types: Cigarettes    Smokeless tobacco: Never Used    Tobacco comment: occ   Substance and Sexual Activity    Alcohol use: Yes     Comment: occ    Drug use: No    Sexual activity: Yes     Partners: Male     Birth control/protection: None   Other Topics Concern    Not on file   Social History Narrative    Not on file         MEDICATIONS:     Current Outpatient Medications:     escitalopram oxalate (LEXAPRO) 10 MG tablet, Take 10 mg by mouth once daily., Disp: , Rfl:     HYDROcodone-acetaminophen (NORCO) 5-325 mg per tablet, Take 1 tablet by mouth every 6 (six) hours as needed for Pain., Disp: , Rfl:     omeprazole (PRILOSEC) 40 MG capsule, Take 40 mg by mouth once daily., Disp: , Rfl:     predniSONE (DELTASONE) 10 MG tablet, TAKE 3 TABLETS DAILY X 5 DAYS, Disp: 15 tablet, Rfl: 0    rOPINIRole (REQUIP) 1 MG tablet, Take 1 mg by mouth 3 (three) times daily., Disp: , Rfl:     spironolactone (ALDACTONE) 100 MG tablet, Take 100 mg by mouth 2 (two) times daily., Disp: , Rfl:     traZODone (DESYREL) 150 MG tablet, Take 150 mg by mouth every evening., Disp: , Rfl:     ALLERGIES:   Review of patient's allergies indicates:   Allergen Reactions    Nuts [tree nut] Hives     [place] allergy        PHYSICAL EXAMINATION:  Ht 5' 2" (1.575 m)   Wt 115.7 kg (255 lb)   BMI 46.64 kg/m²   Vitals signs and nursing note have been reviewed.  General: In no acute distress, well developed, well nourished, no diaphoresis  Eyes: EOM full and smooth, no eye redness or discharge  HENT: normocephalic and atraumatic, neck supple, trachea midline, no nasal discharge  Cardiovascular: no " LE edema  Lungs: respirations non-labored, no conversational dyspnea   Neuro: AAOx3, CN2-12 grossly intact  Skin: No rashes, warm and dry  Psychiatric: cooperative, pleasant, mood and affect appropriate for age    Shoulder: RIGHT  The affected shoulder is compared to the contralateral shoulder.    Observation:    CERVICAL SPINE  Anterior head carriage. Thoracic kyphosis present.  Full AROM in flexion, extension, sidebending, and rotation.    SHOULDER  No ecchymosis, edema, or erythema throughout the shoulder girdle.  No sternal, clavicular, or acromial deformities bilaterally.  No atrophy of the pectorals, deltoids, supraspinatus, infraspinatus, or biceps bilaterally.  No asymmetry of shoulders bilaterally.    ROM:  Active flexion to 180° on left and 100 on right.   Active abduction to 180° on left and 90 on right.    Active internal rotation to T7 on left and lumbar spine on right.    Active external rotation to T4 on left and unable to do on right.      Tenderness:  Tenderness at the SC or AC joint  Tenderness  over the clavicle   Tenderness over biceps tendon or bicipital groove  Tenderness over subacromial space    Strength Testing:  Deltoid - 4+/5 on right due to pain  Biceps - 4+/5 on right due to pain  Triceps - 4+/5 on right due to pain    Special Tests:  Empty can test - positive for pain, equivocal for weakness  Bear hug test - positive for pain, equivocal for weakness  Belly press test - positive for pain, equivocal for weakness  Resisted internal rotation - positive for pain, equivocal for weakness  Resisted external rotation - positive for pain, equivocal for weakness    Neer's test - positive  Hawkin's-Dillon test - positive for pain in all planes    OThads test - positive for pain  Biceps load 1 test - positive for pain  Biceps load 2 test - positive for pain, but difficult to get into proper exam positioning    Speed's test - positive for pain    Sulcus sign - none  AP load and shift laxity -  none    Neurovascular Exam:  2+ radial pulses BL  Sensation intact to light touch in the distal median, radial, and ulnar nerve distributions bilaterally.  Capillary refill intact <2 seconds in all digits bilaterally      IMAGIN. MRI obtained on 19 due to right shoulder pain.  2. MRI images were reviewed personally by me and then directly with patient.  3. FINDINGS: MRI  images obtained demonstrate There is no fracture. There is no dislocation.  There is an incomplete tear of the articular surface in the anterior aspect of the distal portion the supraspinatus tendon.  The rest of the rotator cuff appears to be intact.  There is a tiny amount of fluid within the glenohumeral joint.  Hence, the glenoid labrum is not optimally visualized.  There is a suspected tear in the anterior inferior aspect of the glenoid labrum near the 4:00 position.  There is a suspected tear of the posterior aspect of the glenoid labrum from the 9 to 11:00 o'clock positions.  The intra-articular portion of the long head of the biceps tendon is normal in appearance.  There are mild osteoarthritic changes in the acromioclavicular joint with a mild amount of downward proliferation.  There is a type 1 acromion process with a mild amount of lateral downsloping.  4. IMPRESSION: Incomplete tear of the articular surface in the anterior aspect of the distal portion the supraspinatus tendon. Fluid seen in GH joint. Glenoid labrum not well visualized, but suspected tear at 4:00 position. Mild AC arthritic changes.    ASSESSMENT:      ICD-10-CM ICD-9-CM   1. Chronic right shoulder pain M25.511 719.41    G89.29 338.29   2. Incomplete tear of right rotator cuff M75.111 840.4   3. Effusion of right shoulder M25.411 719.01   4. Hyperalgesia R20.8 782.0     PLAN:  1-3.  Right shoulder pain/incomplete tear of supraspinatus/right shoulder effusion - worsening    - After her last visit, we attempted a short course of prednisone to see if this would  calm down her pain and inflammation and thus we would be able to do a better exam and possibly treat her with osteopathic manipulation.  She admits to acute worsening of her symptoms all being on prednisone, and states that her pain is worse today. She had an MRI obtained on 01/17 and is here to review the results.  She was able to look at them via the patient portal and is very concerned that she has two tears in her shoulder.    - MRI images and findings were personally reviewed by me and with the patient. See above for further details.    - After discussing the MRI results, I advised that a reasonable treatment option at this time would be for another course of formal physical therapy directed at the rotator cuff pathology, as well as a cortisone injection either into the subacromial bursa or glenohumeral joint to help with the swelling and inflammation.  She is very concerned about the diagnosed tear, and before she does anything else she wants to know if this needs to be fixed or not.  She is requesting to speak to a shoulder orthopedic surgeon, and I will send her to see Dr. Antonio here at his next available clinic.  She refuses the offered cortisone injection today.    - If non-surgical management is encouraged, I am more than happy to work with June in doing cortisone injections under ultrasound guidance and/or monitoring her physical therapy progress.    4.  Hyperalgesia - worsening    - Her pain is still admitted to be 10/10.  She is, however, sitting comfortably in the exam room and during some testing today she is able to tolerate more than I was expecting her to.  She still may be a good candidate for osteopathic manipulation in the future, but at this time her pain is still too high and this will likely make her worse before it makes her better.    There is also concerned that her pain was potentially from a neurogenic etiology.  Usually, nerve pain will respond well to steroids.  Since her is  worsens, it is unlikely that this from a nerve issue. Because of this, addressing her shoulder findings and symptoms may help to give her the best pain response.      Future planning includes - she is going to see Dr. nAtonio to discuss her MRI further as she is wanting to know her options from a surgeon's perspective.    All questions were answered to the best of my ability and all concerns were addressed at this time.    Follow up to be determined by her next visit with Dr. Antonio for above.

## 2019-02-01 ENCOUNTER — OFFICE VISIT (OUTPATIENT)
Dept: ORTHOPEDICS | Facility: CLINIC | Age: 34
End: 2019-02-01
Payer: COMMERCIAL

## 2019-02-01 DIAGNOSIS — M25.511 CHRONIC RIGHT SHOULDER PAIN: Primary | ICD-10-CM

## 2019-02-01 DIAGNOSIS — G89.29 CHRONIC RIGHT SHOULDER PAIN: Primary | ICD-10-CM

## 2019-02-01 DIAGNOSIS — M75.20 BICEPS TENDINITIS: ICD-10-CM

## 2019-02-01 DIAGNOSIS — M75.100 ROTATOR CUFF TEAR: ICD-10-CM

## 2019-02-01 DIAGNOSIS — M54.2 NECK PAIN: ICD-10-CM

## 2019-02-01 DIAGNOSIS — M19.019 DJD OF AC (ACROMIOCLAVICULAR) JOINT: ICD-10-CM

## 2019-02-01 PROCEDURE — 99999 PR PBB SHADOW E&M-EST. PATIENT-LVL II: ICD-10-PCS | Mod: PBBFAC,,, | Performed by: ORTHOPAEDIC SURGERY

## 2019-02-01 PROCEDURE — 99214 PR OFFICE/OUTPT VISIT, EST, LEVL IV, 30-39 MIN: ICD-10-PCS | Mod: S$GLB,,, | Performed by: ORTHOPAEDIC SURGERY

## 2019-02-01 PROCEDURE — 99999 PR PBB SHADOW E&M-EST. PATIENT-LVL II: CPT | Mod: PBBFAC,,, | Performed by: ORTHOPAEDIC SURGERY

## 2019-02-01 PROCEDURE — 99214 OFFICE O/P EST MOD 30 MIN: CPT | Mod: S$GLB,,, | Performed by: ORTHOPAEDIC SURGERY

## 2019-02-01 NOTE — PROGRESS NOTES
CC: RIGHT shoulder pain     33 y.o. Female with a history of right shoulder pain for years; reports worsening pain since 2018 without history of DALE.  Previously seen by Dr. Darell High; referred to clinic to discuss surgical options.  Previously tried physical therapy, naproxen, dry needling, e-stim; all without improvement.  Describes burning sensation from right side neck to elbow.      She reports that the pain and weakness is worse with overhead activity. It also bothers her at night.    Is affecting ADLs.        Past Medical History:   Diagnosis Date    Vertigo        Past Surgical History:   Procedure Laterality Date     SECTION      DILATION AND CURETTAGE OF UTERUS      GANGLION CYST EXCISION      TONSILLECTOMY         Family History   Problem Relation Age of Onset    Diabetes Mother     Hypertension Mother     Depression Mother     Hypertension Father     Heart disease Father     Cancer Maternal Aunt     Diabetes Paternal Grandfather          Current Outpatient Medications:     escitalopram oxalate (LEXAPRO) 10 MG tablet, Take 10 mg by mouth once daily., Disp: , Rfl:     HYDROcodone-acetaminophen (NORCO) 5-325 mg per tablet, Take 1 tablet by mouth every 6 (six) hours as needed for Pain., Disp: , Rfl:     omeprazole (PRILOSEC) 40 MG capsule, Take 40 mg by mouth once daily., Disp: , Rfl:     predniSONE (DELTASONE) 10 MG tablet, TAKE 3 TABLETS DAILY X 5 DAYS, Disp: 15 tablet, Rfl: 0    rOPINIRole (REQUIP) 1 MG tablet, Take 1 mg by mouth 3 (three) times daily., Disp: , Rfl:     spironolactone (ALDACTONE) 100 MG tablet, Take 100 mg by mouth 2 (two) times daily., Disp: , Rfl:     traZODone (DESYREL) 150 MG tablet, Take 150 mg by mouth every evening., Disp: , Rfl:     Review of patient's allergies indicates:   Allergen Reactions    Nuts [tree nut] Hives     [place] allergy          REVIEW OF SYSTEMS:  Constitution: Negative. Negative for chills, fever and night sweats.   HENT:  Negative for congestion and headaches.    Eyes: Negative for blurred vision, left vision loss and right vision loss.   Cardiovascular: Negative for chest pain and syncope.   Respiratory: Negative for cough and shortness of breath.    Endocrine: Negative for polydipsia, polyphagia and polyuria.   Hematologic/Lymphatic: Negative for bleeding problem. Does not bruise/bleed easily.   Skin: Negative for dry skin, itching and rash.   Musculoskeletal: Negative for falls.  Positive for right shoulder pain and muscle weakness.   Gastrointestinal: Negative for abdominal pain and bowel incontinence.   Genitourinary: Negative for bladder incontinence and nocturia.   Neurological: Negative for disturbances in coordination, loss of balance and seizures.   Psychiatric/Behavioral: Negative for depression. The patient does not have insomnia.    Allergic/Immunologic: Negative for hives and persistent infections.      PHYSICAL EXAMINATION:  Vitals:  There were no vitals taken for this visit.   General: The patient is alert and oriented x 3.  Mood is pleasant.  Observation of ears, eyes and nose reveal no gross abnormalities.  No labored breathing observed.  Gait is coordinated. Patient can toe walk and heel walk without difficulty.      RIGHT Shoulder / Upper Extremity Exam    OBSERVATION:     Swelling  none  Deformity  none   Discoloration  none   Scapular winging none   Scars   none  Atrophy  none    TENDERNESS / CREPITUS (T/C):          T/C      T/C   Clavicle   -/-  SUPRAspinatus    -/-     AC Jt.    +/-  INFRAspinatus  -/-    SC Jt.    -/-  Deltoid    -/-      G. Tuberosity  -/-  LH BICEP groove  +/-   Acromion:  -/-  Midline Neck   -/-     Scapular Spine -/-  Trapezium   -/-   SMA Scapula  -/-  GH jt. line - post  -/-     Scapulothoracic  -/-         ROM: (* = with pain)  Left shoulder   Right shoulder        AROM (PROM)   AROM (PROM)   FE    170° (175°)     140° (145°)  *   ER at 90° ABD  90°  (90°)    30°  (35°) *   IR  (spine level)   T10     0 *    STRENGTH: (* = with pain) Left shoulder   Right shoulder   SCAPTION   5/5    4+/5*   IR    5/5    4+/5 *   ER    5/5    4+/5 *   BICEPS   5/5    4+/5 *   Deltoid    5/5    5/5     SIGNS:  Painful side       NEER   +    ODENEENS  +    JACOBO   +    SPEEDS  +     DROP ARM   -   BELLY PRESS neg   Superior escape none    LIFT-OFF  neg   X-Body ADD    neg    MOVING VALGUS neg        STABILITY TESTING    Left shoulder   Right shoulder    Translation     Anterior  up face     up face    Posterior  up face    up face    Sulcus   < 10mm    < 10 mm     Signs   Apprehension   neg      neg       Relocation   no change     no change      Jerk test  neg     neg    EXTREMITY NEURO-VASCULAR EXAM:    Sensation grossly intact to light touch all dermatomal regions.    DTR 2+ Biceps, Triceps, BR and Negative Donalds sign   Grossly intact motor function at Elbow, Wrist and Hand   Distal pulses radial and ulnar 2+, brisk cap refill, symmetric.      NECK:  Painless FROM and spinous processes non-tender. Negative Spurlings sign.      OTHER FINDINGS:  + scapular dyskinesia    XRAYS (1/7/19):  Bones: No fracture.  No lytic or blastic lesion.    Joints: No evidence for glenohumeral dislocation.  There is questionable osteopenia of the distal clavicle with mild arthritic change, possibly early distal clavicular osteolysis.    Soft tissues: Unremarkable.    MRI (1/17/19):  1. There is an incomplete tear of the articular surface in the anterior aspect of the distal portion the supraspinatus tendon.  2. There is a tiny amount of fluid within the glenohumeral joint. Hence, the glenoid labrum is not optimally visualized. There is a suspected tear in the anterior inferior aspect of the glenoid labrum near the 4:00 position. There is a suspected tear of the posterior aspect of the glenoid labrum from the 9 to 11:00 o'clock positions.  If additional imaging evaluation is clinically indicated, I recommend  consideration of an MRI arthrogram of the right shoulder.  3. There are mild osteoarthritic changes in the acromioclavicular joint with a mild amount of downward proliferation.  4. There is a type 1 acromion process with a mild amount of lateral downsloping.      ASSESSMENT:   Right shoulder pain, RC tear, AC DJD, BT    PLAN:    Treatment options were discussed with the patient about shoulder.  I reviewed the MRI images with her and what this means for her shoulder.    We discussed both non-operative and operative options for her shoulder and the risks and benefits of each. Time was given for questions to be asked and all concerns were answered.    She would like to go forward with surgery for her shoulder which I think is reasonable.  All specific risks and benefits were reviewed.    These risks include but are not limited to: bleeding, infection, scarring, re-tear of repair, irreparability of the tear, damage to neurovascular structures, damage to cartilage, stiffness, blood clots, pulmonary embolism, swelling, compartment syndrome, need for further surgery, and the risks of anesthesia.      She verbalized her understanding of these risks and wished to proceed with surgery.    Time was spent face-to-face with the patient during this encounter on counseling about treatment options including surgery and coordination of her care for preoperative visits, surgery and post-operative rehab.     The operative plan will be:    right   1. Arthroscopic rotator cuff repair vs debridement with [facility]  2. Arthroscopic subacromial decompression  3. Arthroscopic distal clavicle excision  4. Possible open biceps subpectoral tenodesis vs SLAP repair    Patient will not  need medical clearance prior to the pre-operative appointment.    All questions were answered, patient will contact us for questions or concerns in the interim.

## 2019-02-01 NOTE — LETTER
February 1, 2019      Darell High, DO  [address]  [address]  [facility] [address]           [facility]  [address]  [facility] [address]  Phone: [phone]  Fax: [phone]          Patient: June Weaver   MR Number: [id number]   YOB: 1985   Date of Visit: 2/1/2019       Dear Dr. Darell High:    Thank you for referring June Weaver to me for evaluation. Attached you will find relevant portions of my assessment and plan of care.    If you have questions, please do not hesitate to call me. I look forward to following June Weaver along with you.    Sincerely,    Sravan Antonio MD    Enclosure  CC:  No Recipients    If you would like to receive this communication electronically, please contact [email or web address] or [phone] to request more information on [facility] access.    For providers and/or their staff who would like to refer a patient to [facility], please contact us through our one-stop-shop provider referral line, [facility], at [phone].    If you feel you have received this communication in error or would no longer like to receive these types of communications, please e-mail [email or web address]

## 2019-02-06 ENCOUNTER — PATIENT MESSAGE (OUTPATIENT)
Dept: ORTHOPEDICS | Facility: CLINIC | Age: 34
End: 2019-02-06

## 2019-02-06 DIAGNOSIS — M75.101 NONTRAUMATIC TEAR OF RIGHT ROTATOR CUFF: Primary | ICD-10-CM

## 2019-02-06 DIAGNOSIS — M19.011 ARTHRITIS OF RIGHT ACROMIOCLAVICULAR JOINT: ICD-10-CM

## 2019-02-06 DIAGNOSIS — M75.21 BICEPS TENDINITIS OF RIGHT UPPER EXTREMITY: ICD-10-CM

## 2019-02-07 ENCOUNTER — TELEPHONE (OUTPATIENT)
Dept: SPORTS MEDICINE | Facility: CLINIC | Age: 34
End: 2019-02-07

## 2019-02-07 DIAGNOSIS — M25.511 CHRONIC RIGHT SHOULDER PAIN: Primary | ICD-10-CM

## 2019-02-07 DIAGNOSIS — G89.29 CHRONIC RIGHT SHOULDER PAIN: Primary | ICD-10-CM

## 2019-02-07 NOTE — TELEPHONE ENCOUNTER
----- Message from Alma Gtz sent at 2/7/2019  8:53 AM CST -----  Contact: Self  Pt called requesting a call back from Janette regarding her surgery date that she would like to change. Pt could be reached at [phone]

## 2019-02-07 NOTE — TELEPHONE ENCOUNTER
Spoke to patient about moving her sx date up to a sooner day. Patient was informed that the next available is 03/11 at [facility]. She stated that she will keep her 03/01 day and if anything become available sooner to let her know.

## 2019-02-08 NOTE — TELEPHONE ENCOUNTER
----- Message from Janette Parisi MA sent at 2/6/2019 10:11 AM CST -----  Patient will do PT at     [facility]  [address] [facility], [address]  [place] [address]  [facility]    Office: [phone]  Fax: [phone]    Pre op - 02/26/19    Date of surgery - 03/01/19

## 2019-02-14 ENCOUNTER — PATIENT MESSAGE (OUTPATIENT)
Dept: SPORTS MEDICINE | Facility: CLINIC | Age: 34
End: 2019-02-14

## 2019-02-14 DIAGNOSIS — M19.011 ARTHRITIS OF RIGHT ACROMIOCLAVICULAR JOINT: ICD-10-CM

## 2019-02-14 DIAGNOSIS — M75.21 BICEPS TENDINITIS OF RIGHT UPPER EXTREMITY: ICD-10-CM

## 2019-02-14 DIAGNOSIS — M75.101 NONTRAUMATIC TEAR OF RIGHT ROTATOR CUFF: Primary | ICD-10-CM

## 2019-02-26 ENCOUNTER — OFFICE VISIT (OUTPATIENT)
Dept: SPORTS MEDICINE | Facility: CLINIC | Age: 34
End: 2019-02-26
Payer: COMMERCIAL

## 2019-02-26 VITALS
HEIGHT: 62 IN | HEART RATE: 82 BPM | WEIGHT: 255 LBS | DIASTOLIC BLOOD PRESSURE: 71 MMHG | BODY MASS INDEX: 46.93 KG/M2 | SYSTOLIC BLOOD PRESSURE: 135 MMHG

## 2019-02-26 DIAGNOSIS — M19.011 ARTHRITIS OF RIGHT ACROMIOCLAVICULAR JOINT: ICD-10-CM

## 2019-02-26 DIAGNOSIS — M75.21 BICEPS TENDINITIS OF RIGHT UPPER EXTREMITY: ICD-10-CM

## 2019-02-26 DIAGNOSIS — M75.101 NONTRAUMATIC TEAR OF RIGHT ROTATOR CUFF: Primary | ICD-10-CM

## 2019-02-26 PROCEDURE — 99999 PR PBB SHADOW E&M-EST. PATIENT-LVL III: CPT | Mod: PBBFAC,,, | Performed by: PHYSICIAN ASSISTANT

## 2019-02-26 PROCEDURE — 99999 PR PBB SHADOW E&M-EST. PATIENT-LVL III: ICD-10-PCS | Mod: PBBFAC,,, | Performed by: PHYSICIAN ASSISTANT

## 2019-02-26 PROCEDURE — 99499 UNLISTED E&M SERVICE: CPT | Mod: S$GLB,,, | Performed by: PHYSICIAN ASSISTANT

## 2019-02-26 PROCEDURE — 99499 NO LOS: ICD-10-PCS | Mod: S$GLB,,, | Performed by: PHYSICIAN ASSISTANT

## 2019-02-26 RX ORDER — PROMETHAZINE HYDROCHLORIDE 25 MG/1
25 TABLET ORAL EVERY 6 HOURS PRN
Qty: 40 TABLET | Refills: 0 | Status: SHIPPED | OUTPATIENT
Start: 2019-02-26 | End: 2019-02-26

## 2019-02-26 RX ORDER — ASPIRIN 325 MG
325 TABLET, DELAYED RELEASE (ENTERIC COATED) ORAL 2 TIMES DAILY
Qty: 42 TABLET | Refills: 0 | Status: SHIPPED | OUTPATIENT
Start: 2019-02-26 | End: 2019-09-04

## 2019-02-26 RX ORDER — PROMETHAZINE HYDROCHLORIDE 25 MG/1
25 TABLET ORAL EVERY 6 HOURS PRN
Qty: 40 TABLET | Refills: 0 | Status: SHIPPED | OUTPATIENT
Start: 2019-02-26 | End: 2019-09-04

## 2019-02-26 RX ORDER — MUPIROCIN 20 MG/G
OINTMENT TOPICAL
Status: CANCELLED | OUTPATIENT
Start: 2019-02-26

## 2019-02-26 RX ORDER — TRAMADOL HYDROCHLORIDE 50 MG/1
50 TABLET ORAL EVERY 6 HOURS PRN
Qty: 40 TABLET | Refills: 0 | Status: SHIPPED | OUTPATIENT
Start: 2019-02-26 | End: 2019-03-26 | Stop reason: SDUPTHER

## 2019-02-26 RX ORDER — OXYCODONE AND ACETAMINOPHEN 10; 325 MG/1; MG/1
1 TABLET ORAL EVERY 6 HOURS PRN
Qty: 28 TABLET | Refills: 0 | Status: SHIPPED | OUTPATIENT
Start: 2019-02-26 | End: 2019-02-26

## 2019-02-26 RX ORDER — TRAMADOL HYDROCHLORIDE 50 MG/1
50 TABLET ORAL EVERY 6 HOURS PRN
Qty: 40 TABLET | Refills: 0 | Status: SHIPPED | OUTPATIENT
Start: 2019-02-26 | End: 2019-02-26

## 2019-02-26 RX ORDER — OXYCODONE AND ACETAMINOPHEN 10; 325 MG/1; MG/1
1 TABLET ORAL EVERY 6 HOURS PRN
Qty: 28 TABLET | Refills: 0 | Status: SHIPPED | OUTPATIENT
Start: 2019-02-26 | End: 2019-09-04

## 2019-02-26 RX ORDER — ASPIRIN 325 MG
325 TABLET, DELAYED RELEASE (ENTERIC COATED) ORAL 2 TIMES DAILY
Qty: 42 TABLET | Refills: 0 | Status: SHIPPED | OUTPATIENT
Start: 2019-02-26 | End: 2019-02-26

## 2019-02-26 NOTE — H&P
CC:  right shoulder pain     HPI:    PLAN OF ACTION: June Weaver  is here for a completion of her perioperative paperwork. she Is scheduled to undergo right   1. Arthroscopic rotator cuff repair vs debridement with [employer]  2. Arthroscopic subacromial decompression  3. Arthroscopic distal clavicle excision  4. Possible open biceps subpectoral tenodesis vs SLAP repair    on 3/1/19.  She is a healthy individual and does not need clearance for this procedure.     Risks, indications and benefits of the surgical procedure were discussed with the patient. All questions with regard to surgery, rehab, expected return to functional activities, activities of daily living and recreational endeavors were answered to her satisfaction.    Review of patient's allergies indicates:   Allergen Reactions    Nuts [tree nut] Hives     [place] allergy       Past Medical History:   Diagnosis Date    Vertigo        Past Surgical History:   Procedure Laterality Date     SECTION      DILATION AND CURETTAGE OF UTERUS      GANGLION CYST EXCISION      TONSILLECTOMY         Social History     Socioeconomic History    Marital status:      Spouse name: Not on file    Number of children: Not on file    Years of education: Not on file    Highest education level: Not on file   Social Needs    Financial resource strain: Not on file    Food insecurity - worry: Not on file    Food insecurity - inability: Not on file    Transportation needs - medical: Not on file    Transportation needs - non-medical: Not on file   Occupational History    Not on file   Tobacco Use    Smoking status: Current Some Day Smoker     Types: Cigarettes    Smokeless tobacco: Never Used    Tobacco comment: occ   Substance and Sexual Activity    Alcohol use: Yes     Comment: occ    Drug use: No    Sexual activity: Yes     Partners: Male     Birth control/protection: None   Other Topics Concern    Not on file   Social History  Narrative    Not on file       Family History   Problem Relation Age of Onset    Diabetes Mother     Hypertension Mother     Depression Mother     Hypertension Father     Heart disease Father     Cancer Maternal Aunt     Diabetes Paternal Grandfather          Current Outpatient Medications:     escitalopram oxalate (LEXAPRO) 10 MG tablet, Take 10 mg by mouth once daily., Disp: , Rfl:     HYDROcodone-acetaminophen (NORCO) 5-325 mg per tablet, Take 1 tablet by mouth every 6 (six) hours as needed for Pain., Disp: , Rfl:     omeprazole (PRILOSEC) 40 MG capsule, Take 40 mg by mouth once daily., Disp: , Rfl:     predniSONE (DELTASONE) 10 MG tablet, TAKE 3 TABLETS DAILY X 5 DAYS, Disp: 15 tablet, Rfl: 0    rOPINIRole (REQUIP) 1 MG tablet, Take 1 mg by mouth 3 (three) times daily., Disp: , Rfl:     spironolactone (ALDACTONE) 100 MG tablet, Take 100 mg by mouth 2 (two) times daily., Disp: , Rfl:     traZODone (DESYREL) 150 MG tablet, Take 150 mg by mouth every evening., Disp: , Rfl:     Please see patient's chart for applicable emotional/behavioral/social status.    REVIEW OF SYSTEMS:  Constitution: Negative. Negative for chills, fever and night sweats.   HENT: Negative for congestion and headaches.    Eyes: Negative for blurred vision, left vision loss and right vision loss.   Cardiovascular: Negative for chest pain and syncope.   Respiratory: Negative for cough and shortness of breath.    Endocrine: Negative for polydipsia, polyphagia and polyuria.   Hematologic/Lymphatic: Negative for bleeding problem. Does not bruise/bleed easily.   Skin: Negative for dry skin, itching and rash.   Musculoskeletal: Negative for falls. Positive for right shoulder pain and muscle weakness.   Gastrointestinal: Negative for abdominal pain and bowel incontinence.   Genitourinary: Negative for bladder incontinence and nocturia.   Neurological: Negative for disturbances in coordination, loss of balance and seizures.    Psychiatric/Behavioral: Negative for depression. The patient does not have insomnia.    Allergic/Immunologic: Negative for hives and persistent infections.     Once no other questions were asked, a brief history and physical exam was then performed.    PHYSICAL EXAM:  GEN: A&Ox3, WD WN NAD  HEENT: WNL  CHEST: CTAB, no W/R/R  HEART: RRR, no M/R/G  ABD: Soft, NT ND, BS x4 QUADS  MS; See Epic  NEURO: CN II-XII intact       The surgical consent was then reviewed with the patient, who agreed with all the contents of the consent form and it was signed. she was then given the [facility] surgery packet to bring with her to [facility] for the anesthesia portion of her perioperative paperwork.     PHYSICAL THERAPY:  She was also instructed regarding physical therapy and will begin on  POD#3. She was given a copy of the original prescription to schedule. Another copy of this prescription was also faxed to [facility].    POST OP CARE:instructions were reviewed including care of the wound and dressing after surgery and when she can shower.     PAIN MANAGEMENT: June Weaver was also given her pain management regime, which includes the TENS unit given to her by Arnav Burr along with the education required for its use. She was also instructed regarding the Polar ice unit that will be in place after surgery and her postoperative pain medications.     MEDICATION:  Percocet 10/325mg 1 po q 4-6 hours prn pain  Ultram 50 mg one p.o. q.4-6 hours p.r.n. breakthrough pain,   Phenergan 25 mg one p.o. q.4-6 hours p.r.n. nausea and vomiting.  Colace 100mg BID PRN constipation  EC ASA 325mg BID x 3 weeks  Prilosec OTC    Patient was educated on the signs and symptoms of DVTs as well as the risk of their occurrence.     IMPRESSION: surgical candidate for right   1. Arthroscopic rotator cuff repair vs debridement with [employer]  2. Arthroscopic subacromial decompression  3. Arthroscopic distal clavicle excision  4. Possible open  biceps subpectoral tenodesis vs SLAP repair    CONCLUSION: Pre-operative information reviewed with patient and they have voiced their understanding and signed consent. Proceed with surgery as planned.    Dr. Antonio was present in the office at the time of pre op appointment and available for questions if the patient had any for him. As there were no other questions to be asked, she was given my business card along with Sravan Antonio MD business card if she has any questions or concerns prior to surgery or in the postop period.

## 2019-03-01 PROBLEM — M75.101 NONTRAUMATIC TEAR OF RIGHT ROTATOR CUFF: Status: ACTIVE | Noted: 2019-03-01

## 2019-03-01 PROBLEM — M75.21 BICEPS TENDINITIS OF RIGHT UPPER EXTREMITY: Status: ACTIVE | Noted: 2019-03-01

## 2019-03-01 PROBLEM — E66.01 MORBID OBESITY: Status: ACTIVE | Noted: 2019-03-01

## 2019-03-02 ENCOUNTER — PATIENT MESSAGE (OUTPATIENT)
Dept: ADMINISTRATIVE | Facility: OTHER | Age: 34
End: 2019-03-02

## 2019-03-03 ENCOUNTER — HOSPITAL ENCOUNTER (EMERGENCY)
Facility: HOSPITAL | Age: 34
Discharge: HOME | End: 2019-03-03
Attending: SURGERY
Payer: COMMERCIAL

## 2019-03-03 VITALS
SYSTOLIC BLOOD PRESSURE: 105 MMHG | OXYGEN SATURATION: 100 % | DIASTOLIC BLOOD PRESSURE: 63 MMHG | TEMPERATURE: 99 F | RESPIRATION RATE: 18 BRPM | HEART RATE: 87 BPM | WEIGHT: 260 LBS | BODY MASS INDEX: 47.84 KG/M2 | HEIGHT: 62 IN

## 2019-03-03 DIAGNOSIS — R11.2 NON-INTRACTABLE VOMITING WITH NAUSEA, UNSPECIFIED VOMITING TYPE: Primary | ICD-10-CM

## 2019-03-03 LAB
ALBUMIN SERPL BCP-MCNC: 4.2 G/DL
ALP SERPL-CCNC: 83 U/L
ALT SERPL W/O P-5'-P-CCNC: 18 U/L
ANION GAP SERPL CALC-SCNC: 8 MMOL/L
AST SERPL-CCNC: 19 U/L
BACTERIA #/AREA URNS AUTO: ABNORMAL /HPF
BASOPHILS # BLD AUTO: 0.03 K/UL
BASOPHILS NFR BLD: 0.3 %
BILIRUB SERPL-MCNC: 0.7 MG/DL
BILIRUB UR QL STRIP: NEGATIVE
BUN SERPL-MCNC: 15 MG/DL
CALCIUM SERPL-MCNC: 9 MG/DL
CHLORIDE SERPL-SCNC: 104 MMOL/L
CLARITY UR REFRACT.AUTO: CLEAR
CO2 SERPL-SCNC: 25 MMOL/L
COLOR UR AUTO: YELLOW
CREAT SERPL-MCNC: 0.63 MG/DL
DIFFERENTIAL METHOD: ABNORMAL
EOSINOPHIL # BLD AUTO: 0.2 K/UL
EOSINOPHIL NFR BLD: 1.5 %
ERYTHROCYTE [DISTWIDTH] IN BLOOD BY AUTOMATED COUNT: 13.8 %
EST. GFR  (AFRICAN AMERICAN): >60 ML/MIN/1.73 M^2
EST. GFR  (NON AFRICAN AMERICAN): >60 ML/MIN/1.73 M^2
GLUCOSE SERPL-MCNC: 92 MG/DL
GLUCOSE UR QL STRIP: NEGATIVE
HCT VFR BLD AUTO: 47.6 %
HGB BLD-MCNC: 15.4 G/DL
HGB UR QL STRIP: NEGATIVE
KETONES UR QL STRIP: NEGATIVE
LEUKOCYTE ESTERASE UR QL STRIP: ABNORMAL
LIPASE SERPL-CCNC: 99 U/L
LYMPHOCYTES # BLD AUTO: 0.6 K/UL
LYMPHOCYTES NFR BLD: 5.5 %
MCH RBC QN AUTO: 29.9 PG
MCHC RBC AUTO-ENTMCNC: 32.4 G/DL
MCV RBC AUTO: 92 FL
MICROSCOPIC COMMENT: ABNORMAL
MONOCYTES # BLD AUTO: 0.7 K/UL
MONOCYTES NFR BLD: 7.1 %
NEUTROPHILS # BLD AUTO: 8.6 K/UL
NEUTROPHILS NFR BLD: 85.4 %
NITRITE UR QL STRIP: NEGATIVE
PH UR STRIP: 7 [PH] (ref 5–8)
PLATELET # BLD AUTO: 170 K/UL
PMV BLD AUTO: 11.3 FL
POTASSIUM SERPL-SCNC: 4.2 MMOL/L
PROT SERPL-MCNC: 7.6 G/DL
PROT UR QL STRIP: NEGATIVE
RBC # BLD AUTO: 5.15 M/UL
SODIUM SERPL-SCNC: 137 MMOL/L
SP GR UR STRIP: 1 (ref 1–1.03)
SQUAMOUS #/AREA URNS AUTO: 5 /HPF
URN SPEC COLLECT METH UR: ABNORMAL
UROBILINOGEN UR STRIP-ACNC: NEGATIVE EU/DL
WBC # BLD AUTO: 10.1 K/UL
WBC #/AREA URNS AUTO: 1 /HPF (ref 0–5)

## 2019-03-03 PROCEDURE — 96374 THER/PROPH/DIAG INJ IV PUSH: CPT | Mod: ER

## 2019-03-03 PROCEDURE — 83690 ASSAY OF LIPASE: CPT | Mod: ER

## 2019-03-03 PROCEDURE — 96361 HYDRATE IV INFUSION ADD-ON: CPT | Mod: ER

## 2019-03-03 PROCEDURE — 63600175 PHARM REV CODE 636 W HCPCS: Mod: ER | Performed by: PHYSICIAN ASSISTANT

## 2019-03-03 PROCEDURE — 81000 URINALYSIS NONAUTO W/SCOPE: CPT | Mod: ER

## 2019-03-03 PROCEDURE — 80053 COMPREHEN METABOLIC PANEL: CPT | Mod: ER

## 2019-03-03 PROCEDURE — 85025 COMPLETE CBC W/AUTO DIFF WBC: CPT | Mod: ER

## 2019-03-03 PROCEDURE — 99284 EMERGENCY DEPT VISIT MOD MDM: CPT | Mod: ER

## 2019-03-03 PROCEDURE — 25000003 PHARM REV CODE 250: Mod: ER | Performed by: PHYSICIAN ASSISTANT

## 2019-03-03 RX ORDER — ONDANSETRON 2 MG/ML
4 INJECTION INTRAMUSCULAR; INTRAVENOUS
Status: COMPLETED | OUTPATIENT
Start: 2019-03-03 | End: 2019-03-03

## 2019-03-03 RX ORDER — OXYCODONE AND ACETAMINOPHEN 5; 325 MG/1; MG/1
1 TABLET ORAL
Status: COMPLETED | OUTPATIENT
Start: 2019-03-03 | End: 2019-03-03

## 2019-03-03 RX ADMIN — SODIUM CHLORIDE 1000 ML: 0.9 INJECTION, SOLUTION INTRAVENOUS at 01:03

## 2019-03-03 RX ADMIN — OXYCODONE HYDROCHLORIDE AND ACETAMINOPHEN 1 TABLET: 5; 325 TABLET ORAL at 03:03

## 2019-03-03 RX ADMIN — ONDANSETRON 4 MG: 2 INJECTION INTRAMUSCULAR; INTRAVENOUS at 01:03

## 2019-03-03 NOTE — DISCHARGE INSTRUCTIONS
You are advised to follow up with your primary care provider for re-evaluation within 3 days.  Your instructed to return to the emergency department immediately for any new or worsening symptoms.

## 2019-03-03 NOTE — ED PROVIDER NOTES
Encounter Date: 3/3/2019       History     Chief Complaint   Patient presents with    Emesis     shoulder surgery two days ago at [facility]. states d/c friday evening, felt fine yesterday but started vomiting this am- 12 times. pt taking phenergen with no relief.     33-year-old female presents to the emergency department for evaluation of acute onset nausea and vomiting. She reports that on 2019 she had right-sided rotator cuff surgery with Dr. Antonio.  She reports that was an outpatient procedure.  She states that she was sent home with nausea medication and pain medication.  She states that yesterday she was feeling great able to take her pain medication and healing well.  She reports that this morning she began to have some nausea approximately  8:00 a.m. this morning and has had multiple episodes of vomiting since that time.  She denies any associated diarrhea, fever, chills, headache, dizziness, vision changes, chest pain, shortness of breath, abdominal pain, constipation, or dysuria.  Attempted treatment with her Phenergan with no relief of symptoms.  Last dose was 10:00 a.m. this morning.  She reports that her last bowel movement was yesterday.  She states she attempted to take her pain medication at around the same time and vomited.  She denies any redness, swelling or wrist pain to the shoulder.  She denies any numbness, tingling or weakness to the upper extremities.          Review of patient's allergies indicates:   Allergen Reactions    Nuts [tree nut] Hives     [place] allergy     Past Medical History:   Diagnosis Date    Hypoglycemic disorder     Vertigo      Past Surgical History:   Procedure Laterality Date     SECTION      DILATION AND CURETTAGE OF UTERUS      GANGLION CYST EXCISION      TONSILLECTOMY       Family History   Problem Relation Age of Onset    Diabetes Mother     Hypertension Mother     Depression Mother     Hypertension Father     Heart disease Father      Cancer Maternal Aunt     Diabetes Paternal Grandfather      Social History     Tobacco Use    Smoking status: Former Smoker     Types: Cigarettes     Last attempt to quit: 2017     Years since quittin.0    Smokeless tobacco: Never Used   Substance Use Topics    Alcohol use: Yes     Comment: occ    Drug use: No     Review of Systems   Constitutional: Negative for activity change, appetite change and fever.   HENT: Negative for congestion, ear pain, postnasal drip, sinus pressure, sinus pain, sore throat, trouble swallowing and voice change.    Eyes: Negative for photophobia and visual disturbance.   Respiratory: Negative for cough, shortness of breath and wheezing.    Cardiovascular: Negative for chest pain.   Gastrointestinal: Positive for nausea and vomiting. Negative for abdominal pain, constipation and diarrhea.   Genitourinary: Negative for decreased urine volume and dysuria.   Musculoskeletal: Negative for back pain and neck pain.   Neurological: Negative for dizziness, syncope, weakness, light-headedness, numbness and headaches.       Physical Exam     Initial Vitals [19 1326]   BP Pulse Resp Temp SpO2   (!) 126/59 110 18 97.9 °F (36.6 °C) 100 %      MAP       --         Physical Exam    Nursing note and vitals reviewed.  Constitutional: She appears well-developed and well-nourished. She is not diaphoretic. No distress.   HENT:   Head: Normocephalic and atraumatic.   Right Ear: External ear normal.   Left Ear: External ear normal.   Nose: Nose normal.   Mouth/Throat: Oropharynx is clear and moist.   Eyes: Conjunctivae and EOM are normal. Pupils are equal, round, and reactive to light.   Neck: Normal range of motion. Neck supple.   Cardiovascular: Normal rate, regular rhythm and normal heart sounds.   Pulmonary/Chest: Breath sounds normal. No respiratory distress. She has no wheezes. She has no rhonchi. She has no rales. She exhibits no tenderness.   Abdominal: Soft. Bowel sounds are  normal. She exhibits no distension. There is no tenderness. There is no rebound and no CVA tenderness.   Musculoskeletal:        Arms:  Lymphadenopathy:     She has no cervical adenopathy.   Neurological: She is alert and oriented to person, place, and time.   Skin: Skin is warm and dry.   Psychiatric: She has a normal mood and affect.         ED Course   Procedures  Labs Reviewed   CBC W/ AUTO DIFFERENTIAL   COMPREHENSIVE METABOLIC PANEL   LIPASE   PREGNANCY TEST, URINE RAPID   URINALYSIS, REFLEX TO URINE CULTURE          Imaging Results    None          Medical Decision Making:   Initial Assessment:   33-year-old female presents for evaluation of vomiting. Physical exam reveals a nontoxic-appearing female in no acute distress. Patient is afebrile vital signs within normal limits.  Neurological exam reveals an alert and oriented patient. Neck is supple, no meningeal signs noted. Lungs clear to auscultation bilaterally.  No respiratory distress or accessory muscle use noted. Abdominal exam reveals soft abdomen, nontender to palpation. No CVA tenderness noted.  Differential Diagnosis:   I carefully considered but doubt serious intra-abdominal etiology including acute appendicitis, acute cholecystitis or bowel obstruction.  ED Management:  She reports that she just had a negative pregnancy test prior to her surgery.  She declined repeat test.  CBC reveals no acute leukocytosis or anemia.  CMP results within normal limits. Lipase 99.  Urinalysis reveals no evidence of UTI.  Patient was given saline and Zofran with relief of symptoms. Instructed the patient to follow up with her primary care provider for re-evaluation within 3 days.  Instructed the patient to return to the emergency department immediately for any new or worsening symptoms. I discussed this patient with Dr. Sterling who is in agreement course of treatment.                      Clinical Impression:       ICD-10-CM ICD-9-CM   1. Non-intractable vomiting  with nausea, unspecified vomiting type R11.2 787.01                                Vera Middleton PA-C  03/03/19 1552       Vera Middleton PA-C  03/03/19 1604

## 2019-03-04 ENCOUNTER — PATIENT MESSAGE (OUTPATIENT)
Dept: SPORTS MEDICINE | Facility: CLINIC | Age: 34
End: 2019-03-04

## 2019-03-04 DIAGNOSIS — R11.0 NAUSEA: Primary | ICD-10-CM

## 2019-03-04 RX ORDER — ONDANSETRON HYDROCHLORIDE 8 MG/1
8 TABLET, FILM COATED ORAL EVERY 8 HOURS PRN
Qty: 21 TABLET | Refills: 0 | Status: SHIPPED | OUTPATIENT
Start: 2019-03-04 | End: 2019-09-04

## 2019-03-04 NOTE — TELEPHONE ENCOUNTER
Patient reached out to office via [facility] portal.  She reports doing well except for vomiting starting Sunday; was seen in the ED for this.  She is requesting Zofran 8mg

## 2019-03-06 ENCOUNTER — TELEPHONE (OUTPATIENT)
Dept: SPORTS MEDICINE | Facility: CLINIC | Age: 34
End: 2019-03-06

## 2019-03-06 NOTE — TELEPHONE ENCOUNTER
Returning call to [facility] ([facility]).  Operative report and physical therapy post operative protocol have been faxed.

## 2019-03-06 NOTE — TELEPHONE ENCOUNTER
----- Message from Joseph Crowell sent at 3/6/2019 10:13 AM CST -----  Contact: [facility]  Please call [facility] at [phone] if any question    Fax# [phone]    Please send the operative reports and physical therapy protocol    Patient is currently at the office    Thank you

## 2019-03-08 ENCOUNTER — PATIENT MESSAGE (OUTPATIENT)
Dept: ADMINISTRATIVE | Facility: OTHER | Age: 34
End: 2019-03-08

## 2019-03-11 ENCOUNTER — PATIENT MESSAGE (OUTPATIENT)
Dept: SPORTS MEDICINE | Facility: CLINIC | Age: 34
End: 2019-03-11

## 2019-03-12 ENCOUNTER — OFFICE VISIT (OUTPATIENT)
Dept: SPORTS MEDICINE | Facility: CLINIC | Age: 34
End: 2019-03-12
Payer: COMMERCIAL

## 2019-03-12 VITALS
WEIGHT: 260 LBS | SYSTOLIC BLOOD PRESSURE: 136 MMHG | HEIGHT: 62 IN | HEART RATE: 98 BPM | BODY MASS INDEX: 47.84 KG/M2 | DIASTOLIC BLOOD PRESSURE: 79 MMHG

## 2019-03-12 DIAGNOSIS — Z98.890 POST-OPERATIVE STATE: Primary | ICD-10-CM

## 2019-03-12 PROCEDURE — 99024 POSTOP FOLLOW-UP VISIT: CPT | Mod: S$GLB,,, | Performed by: ORTHOPAEDIC SURGERY

## 2019-03-12 PROCEDURE — 99024 PR POST-OP FOLLOW-UP VISIT: ICD-10-PCS | Mod: S$GLB,,, | Performed by: ORTHOPAEDIC SURGERY

## 2019-03-12 PROCEDURE — 99999 PR PBB SHADOW E&M-EST. PATIENT-LVL III: ICD-10-PCS | Mod: PBBFAC,,, | Performed by: ORTHOPAEDIC SURGERY

## 2019-03-12 PROCEDURE — 99999 PR PBB SHADOW E&M-EST. PATIENT-LVL III: CPT | Mod: PBBFAC,,, | Performed by: ORTHOPAEDIC SURGERY

## 2019-03-12 RX ORDER — SULFAMETHOXAZOLE AND TRIMETHOPRIM 800; 160 MG/1; MG/1
1 TABLET ORAL 2 TIMES DAILY
Qty: 14 TABLET | Refills: 0 | Status: SHIPPED | OUTPATIENT
Start: 2019-03-12 | End: 2019-03-19

## 2019-03-12 NOTE — PROGRESS NOTES
"CC: R shoulder post op 11 days    DATE OF SURGERY:3/1/2019        PREOPERATIVE DIAGNOSES:   1. Right shoulder rotator cuff tear (subscapularis / supraspinatus)   2. Right shoulder biceps tendinopathy  3. Right shoulder labral tear     POSTOPERATIVE DIAGNOSES:   1. Right shoulder rotator cuff tear  (subscapularis / supraspinatus)  2. Right shoulder biceps tendinopathy  3. Right shoulder labral tear        PROCEDURE:   1. Right shoulder arthroscopic rotator cuff repair (subscapularis)  2. Placement of a right shoulder Regeneten implant on posterosuperior rotator cuff  3. Right shoulder arthroscopic biceps autotenodesis  4. Right shoulder arthroscopic subacromial decompression.  5. Right shoulder arthroscopic debridement labrum      SURGEON: Sravan Antonio M.D.      ASSISTANT:   1. Stephanie Finn    Pain well tolerated on pain medication  Sling in place  Having some wound healing issues and drainage from anterior incisions    Review of Systems   Constitution: Negative. Negative for chills, fever and night sweats.    Cardiovascular: Negative for chest pain and syncope.   Respiratory: Negative for cough and shortness of breath.    Gastrointestinal: Negative for abdominal pain and bowel incontinence.      PE:    /79   Pulse 98   Ht 5' 2" (1.575 m)   Wt 117.9 kg (260 lb)   BMI 47.55 kg/m²      R shoulder    Posterior portal incision c/d/i  Anterior portals incisions (4) all dehisced with fibrinous tissue, mild serous drainage  Mild surrounding erythema with mild TTP  No fluctuance, induration  Compartments soft  Neurovascular status intact in extremity    PROM  Forward elevation 45 degrees  External rotation 20 degrees    Assessment:  11 days s/p above with superficial wound dehiscence anterior incisions    Plan:  1. Continue PT   2. Pain medication as needed for PT; try to wean off for next visit  3. Return to clinic in 3 days for wound check  4. PO Bactrim DS BID for 7 days  "

## 2019-03-15 ENCOUNTER — OFFICE VISIT (OUTPATIENT)
Dept: ORTHOPEDICS | Facility: CLINIC | Age: 34
End: 2019-03-15
Payer: COMMERCIAL

## 2019-03-15 DIAGNOSIS — Z98.890 S/P ROTATOR CUFF REPAIR: ICD-10-CM

## 2019-03-15 DIAGNOSIS — Z98.890 POST-OPERATIVE STATE: Primary | ICD-10-CM

## 2019-03-15 DIAGNOSIS — M62.838 MUSCLE SPASM: ICD-10-CM

## 2019-03-15 PROCEDURE — 99999 PR PBB SHADOW E&M-EST. PATIENT-LVL I: ICD-10-PCS | Mod: PBBFAC,,, | Performed by: ORTHOPAEDIC SURGERY

## 2019-03-15 PROCEDURE — 99999 PR PBB SHADOW E&M-EST. PATIENT-LVL I: CPT | Mod: PBBFAC,,, | Performed by: ORTHOPAEDIC SURGERY

## 2019-03-15 PROCEDURE — 99024 POSTOP FOLLOW-UP VISIT: CPT | Mod: S$GLB,,, | Performed by: ORTHOPAEDIC SURGERY

## 2019-03-15 PROCEDURE — 99024 PR POST-OP FOLLOW-UP VISIT: ICD-10-PCS | Mod: S$GLB,,, | Performed by: ORTHOPAEDIC SURGERY

## 2019-03-15 RX ORDER — CYCLOBENZAPRINE HCL 10 MG
10 TABLET ORAL 3 TIMES DAILY PRN
Qty: 30 TABLET | Refills: 0 | Status: SHIPPED | OUTPATIENT
Start: 2019-03-15 | End: 2019-03-25

## 2019-03-15 NOTE — PROGRESS NOTES
CC: R shoulder post op 2 months    DATE OF SURGERY:3/1/2019        PREOPERATIVE DIAGNOSES:   1. Right shoulder rotator cuff tear (subscapularis / supraspinatus)   2. Right shoulder biceps tendinopathy  3. Right shoulder labral tear     POSTOPERATIVE DIAGNOSES:   1. Right shoulder rotator cuff tear  (subscapularis / supraspinatus)  2. Right shoulder biceps tendinopathy  3. Right shoulder labral tear      PROCEDURE:   1. Right shoulder arthroscopic rotator cuff repair (subscapularis)  2. Placement of a right shoulder Regeneten implant on posterosuperior rotator cuff  3. Right shoulder arthroscopic biceps autotenodesis  4. Right shoulder arthroscopic subacromial decompression.  5. Right shoulder arthroscopic debridement labrum      SURGEON: Sravan Antonio M.D.      Pain well tolerated on pain medication  Sling in place  Having some wound healing issues from anterior incisions; anterior incisions improved since last visit with Bactrim.  No longer draining.    Review of Systems   Constitution: Negative. Negative for chills, fever and night sweats.    Cardiovascular: Negative for chest pain and syncope.   Respiratory: Negative for cough and shortness of breath.    Gastrointestinal: Negative for abdominal pain and bowel incontinence.      PE:    There were no vitals taken for this visit.     R shoulder    Posterior portal incision c/d/i  Anterior portals incisions (4) all dehisced with fibrinous tissue, no drainage  Mild surrounding erythema with mild TTP  No fluctuance, induration  Compartments soft  Neurovascular status intact in extremity    PROM  Forward elevation 45 degrees  External rotation 20 degrees    Assessment:  2 weeks s/p above with superficial wound dehiscence anterior incisions  Muscle spasm - low back    Plan:  1. Continue PT   2. Pain medication as needed for PT; try to wean off for next visit  3. Return to clinic in 3 days for wound check  4. Finish Bactrim prescription  5. Flexerill 10mg TID #30 no  refills

## 2019-03-26 DIAGNOSIS — M75.21 BICEPS TENDINITIS OF RIGHT UPPER EXTREMITY: ICD-10-CM

## 2019-03-26 DIAGNOSIS — M19.011 ARTHRITIS OF RIGHT ACROMIOCLAVICULAR JOINT: ICD-10-CM

## 2019-03-26 DIAGNOSIS — M75.101 NONTRAUMATIC TEAR OF RIGHT ROTATOR CUFF: ICD-10-CM

## 2019-03-26 RX ORDER — TRAMADOL HYDROCHLORIDE 50 MG/1
50 TABLET ORAL EVERY 8 HOURS PRN
Qty: 40 TABLET | Refills: 0 | Status: SHIPPED | OUTPATIENT
Start: 2019-03-26 | End: 2019-09-04

## 2019-03-26 NOTE — TELEPHONE ENCOUNTER
----- Message from Joseph Crowell sent at 3/26/2019  9:13 AM CDT -----  Contact: [facility]  Please call Ramandeep at [place]    Clarification needed for traMADol (ULTRAM) 50 mg tablet    Use [facility] - [address] [phone] (Phone)  [phone] (Fax)    Thank you

## 2019-03-27 ENCOUNTER — PATIENT MESSAGE (OUTPATIENT)
Dept: ORTHOPEDICS | Facility: CLINIC | Age: 34
End: 2019-03-27

## 2019-03-28 ENCOUNTER — OFFICE VISIT (OUTPATIENT)
Dept: SPORTS MEDICINE | Facility: CLINIC | Age: 34
End: 2019-03-28
Payer: COMMERCIAL

## 2019-03-28 VITALS
WEIGHT: 260 LBS | DIASTOLIC BLOOD PRESSURE: 79 MMHG | SYSTOLIC BLOOD PRESSURE: 124 MMHG | HEIGHT: 62 IN | BODY MASS INDEX: 47.84 KG/M2 | HEART RATE: 107 BPM

## 2019-03-28 DIAGNOSIS — Z98.890 POST-OPERATIVE STATE: Primary | ICD-10-CM

## 2019-03-28 PROCEDURE — 99024 PR POST-OP FOLLOW-UP VISIT: ICD-10-PCS | Mod: S$GLB,,, | Performed by: PHYSICIAN ASSISTANT

## 2019-03-28 PROCEDURE — 99999 PR PBB SHADOW E&M-EST. PATIENT-LVL III: CPT | Mod: PBBFAC,,, | Performed by: PHYSICIAN ASSISTANT

## 2019-03-28 PROCEDURE — 99999 PR PBB SHADOW E&M-EST. PATIENT-LVL III: ICD-10-PCS | Mod: PBBFAC,,, | Performed by: PHYSICIAN ASSISTANT

## 2019-03-28 PROCEDURE — 99024 POSTOP FOLLOW-UP VISIT: CPT | Mod: S$GLB,,, | Performed by: PHYSICIAN ASSISTANT

## 2019-03-28 NOTE — PROGRESS NOTES
"CC: right shoulder post op 4 weeks    Pain well tolerated on pain medication  Sling in place  She is here today for a wound check. She finished the bactrim.     DATE OF SURGERY:3/1/2019        PREOPERATIVE DIAGNOSES:   1. Right shoulder rotator cuff tear (subscapularis / supraspinatus)   2. Right shoulder biceps tendinopathy  3. Right shoulder labral tear     POSTOPERATIVE DIAGNOSES:   1. Right shoulder rotator cuff tear  (subscapularis / supraspinatus)  2. Right shoulder biceps tendinopathy  3. Right shoulder labral tear     PROCEDURE:   1. Right shoulder arthroscopic rotator cuff repair (subscapularis)  2. Placement of a right shoulder Regeneten implant on posterosuperior rotator cuff  3. Right shoulder arthroscopic biceps autotenodesis  4. Right shoulder arthroscopic subacromial decompression.  5. Right shoulder arthroscopic debridement labrum      SURGEON: Sravan Antonio M.D.    Review of Systems   Constitution: Negative. Negative for chills, fever and night sweats.    Cardiovascular: Negative for chest pain and syncope.   Respiratory: Negative for cough and shortness of breath.    Gastrointestinal: Negative for abdominal pain and bowel incontinence.      PE:    /79   Pulse 107   Ht 5' 2" (1.575 m)   Wt 117.9 kg (260 lb)   BMI 47.55 kg/m²      Right shoulder    Posterior portal incision and one anterior portal incision c/d/i  3 Anterior portals incisions with mild dehiscence with fibrinous tissue, no drainage- improving since last visit  No surrounding erythema with mild TTP  No fluctuance, induration  Compartments soft  Neurovascular status intact in extremity    PROM  Forward elevation 80 degrees  External rotation 20 degrees    Assessment:  4 weeks s/p right shoulder RCR     Plan:  1. Continue PT   2. Pain medication as needed for PT; try to wean off for next visit  3. Return to clinic for 6 week post op follow up  4. Incisions cleaned and re-dressed with steri strips and new dressing. Patient " instructed on wound care.

## 2019-04-02 ENCOUNTER — PATIENT MESSAGE (OUTPATIENT)
Dept: ORTHOPEDICS | Facility: CLINIC | Age: 34
End: 2019-04-02

## 2019-04-22 ENCOUNTER — TELEPHONE (OUTPATIENT)
Dept: SPORTS MEDICINE | Facility: CLINIC | Age: 34
End: 2019-04-22

## 2019-04-22 NOTE — TELEPHONE ENCOUNTER
----- Message from Alma Gtz sent at 4/22/2019  9:34 AM CDT -----  Contact: [facility] [phone]  Called requesting a return call back regarding a fax to continue physical therapy for this pt that was faxed over since 04/17 that needs to be signed and faxed back to the facility. Please send fax over to [phone]

## 2019-04-22 NOTE — TELEPHONE ENCOUNTER
Spoke to Cris at [facility] and informed her that [facility] Pt order will be signed and faxed on 04/23/19 after Dr. Antonio signs the order.

## 2019-04-24 ENCOUNTER — PATIENT MESSAGE (OUTPATIENT)
Dept: ORTHOPEDICS | Facility: CLINIC | Age: 34
End: 2019-04-24

## 2019-04-25 ENCOUNTER — TELEPHONE (OUTPATIENT)
Dept: ORTHOPEDICS | Facility: CLINIC | Age: 34
End: 2019-04-25

## 2019-04-25 ENCOUNTER — PATIENT MESSAGE (OUTPATIENT)
Dept: SPORTS MEDICINE | Facility: CLINIC | Age: 34
End: 2019-04-25

## 2019-04-25 DIAGNOSIS — M25.511 CHRONIC RIGHT SHOULDER PAIN: Primary | ICD-10-CM

## 2019-04-25 DIAGNOSIS — M54.2 NECK PAIN: ICD-10-CM

## 2019-04-25 DIAGNOSIS — G89.29 CHRONIC RIGHT SHOULDER PAIN: Primary | ICD-10-CM

## 2019-05-02 ENCOUNTER — PATIENT MESSAGE (OUTPATIENT)
Dept: ORTHOPEDICS | Facility: CLINIC | Age: 34
End: 2019-05-02

## 2019-06-21 ENCOUNTER — TELEPHONE (OUTPATIENT)
Dept: SPORTS MEDICINE | Facility: CLINIC | Age: 34
End: 2019-06-21

## 2019-06-21 NOTE — TELEPHONE ENCOUNTER
Returning call to patient.  Rescheduled appointment to Tuesday.  Confirmed appointment with patient.

## 2019-06-21 NOTE — TELEPHONE ENCOUNTER
----- Message from Racquel Jarrett sent at 6/21/2019  5:00 PM CDT -----  Appointment Request From: June Weaver    With Provider: Sravan Antonio MD [[facility]]    Preferred Date Range: 6/28/2019 - 6/28/2019    Preferred Times: Any time    Reason for visit: Existing Patient    Comments:  Post op     Via pt portal

## 2019-06-25 ENCOUNTER — OFFICE VISIT (OUTPATIENT)
Dept: SPORTS MEDICINE | Facility: CLINIC | Age: 34
End: 2019-06-25
Payer: COMMERCIAL

## 2019-06-25 VITALS
SYSTOLIC BLOOD PRESSURE: 143 MMHG | DIASTOLIC BLOOD PRESSURE: 81 MMHG | HEART RATE: 76 BPM | WEIGHT: 260 LBS | BODY MASS INDEX: 47.84 KG/M2 | HEIGHT: 62 IN

## 2019-06-25 DIAGNOSIS — Z98.890 S/P ROTATOR CUFF REPAIR: Primary | ICD-10-CM

## 2019-06-25 PROCEDURE — 99999 PR PBB SHADOW E&M-EST. PATIENT-LVL III: ICD-10-PCS | Mod: PBBFAC,,, | Performed by: ORTHOPAEDIC SURGERY

## 2019-06-25 PROCEDURE — 3008F BODY MASS INDEX DOCD: CPT | Mod: CPTII,S$GLB,, | Performed by: ORTHOPAEDIC SURGERY

## 2019-06-25 PROCEDURE — 99214 OFFICE O/P EST MOD 30 MIN: CPT | Mod: 25,S$GLB,, | Performed by: ORTHOPAEDIC SURGERY

## 2019-06-25 PROCEDURE — 99999 PR PBB SHADOW E&M-EST. PATIENT-LVL III: CPT | Mod: PBBFAC,,, | Performed by: ORTHOPAEDIC SURGERY

## 2019-06-25 PROCEDURE — 99214 PR OFFICE/OUTPT VISIT, EST, LEVL IV, 30-39 MIN: ICD-10-PCS | Mod: 25,S$GLB,, | Performed by: ORTHOPAEDIC SURGERY

## 2019-06-25 PROCEDURE — 3008F PR BODY MASS INDEX (BMI) DOCUMENTED: ICD-10-PCS | Mod: CPTII,S$GLB,, | Performed by: ORTHOPAEDIC SURGERY

## 2019-06-25 NOTE — PROGRESS NOTES
"CC: right shoulder post op 4 months    June Weaver returns to clinic for follow up evaluation of right shoulder.  She continues to have right shoulder pain.    DATE OF SURGERY:3/1/2019        PREOPERATIVE DIAGNOSES:   1. Right shoulder rotator cuff tear (subscapularis / supraspinatus)   2. Right shoulder biceps tendinopathy  3. Right shoulder labral tear     POSTOPERATIVE DIAGNOSES:   1. Right shoulder rotator cuff tear  (subscapularis / supraspinatus)  2. Right shoulder biceps tendinopathy  3. Right shoulder labral tear     PROCEDURE:   1. Right shoulder arthroscopic rotator cuff repair (subscapularis)  2. Placement of a right shoulder Regeneten implant on posterosuperior rotator cuff  3. Right shoulder arthroscopic biceps autotenodesis  4. Right shoulder arthroscopic subacromial decompression.  5. Right shoulder arthroscopic debridement labrum      SURGEON: Sravan Antonio M.D.    Review of Systems   Constitution: Negative. Negative for chills, fever and night sweats.    Cardiovascular: Negative for chest pain and syncope.   Respiratory: Negative for cough and shortness of breath.    Gastrointestinal: Negative for abdominal pain and bowel incontinence.      PE:    BP (!) 143/81   Pulse 76   Ht 5' 2" (1.575 m)   Wt 117.9 kg (260 lb)   BMI 47.55 kg/m²      Right shoulder    Posterior portal incision and one anterior portal incision c/d/i  3 Anterior portals incisions with mild dehiscence with fibrinous tissue, no drainage- improving since last visit  No surrounding erythema with mild TTP  No fluctuance, induration  Compartments soft  Neurovascular status intact in extremity    PROM  Forward elevation 90 degrees  External rotation 20 degrees    Assessment:  4 months s/p right shoulder RCR     Plan:  1. Continue PT   2. EMG scheduled for 8/1/19  3. Follow up after EMG to discuss results      "

## 2019-08-01 ENCOUNTER — PROCEDURE VISIT (OUTPATIENT)
Dept: NEUROLOGY | Facility: CLINIC | Age: 34
End: 2019-08-01
Payer: MEDICAID

## 2019-08-01 DIAGNOSIS — R20.2 BILATERAL NUMBNESS AND TINGLING OF ARMS AND LEGS: ICD-10-CM

## 2019-08-01 DIAGNOSIS — M25.511 CHRONIC RIGHT SHOULDER PAIN: ICD-10-CM

## 2019-08-01 DIAGNOSIS — R20.0 BILATERAL NUMBNESS AND TINGLING OF ARMS AND LEGS: ICD-10-CM

## 2019-08-01 DIAGNOSIS — G89.29 CHRONIC RIGHT SHOULDER PAIN: ICD-10-CM

## 2019-08-01 DIAGNOSIS — M54.2 NECK PAIN: ICD-10-CM

## 2019-08-01 PROCEDURE — 95910 NRV CNDJ TEST 7-8 STUDIES: CPT | Mod: PBBFAC,PO | Performed by: NEUROMUSCULOSKELETAL MEDICINE & OMM

## 2019-08-01 PROCEDURE — 95886 MUSC TEST DONE W/N TEST COMP: CPT | Mod: PBBFAC,PO | Performed by: NEUROMUSCULOSKELETAL MEDICINE & OMM

## 2019-08-01 PROCEDURE — 95886 MUSC TEST DONE W/N TEST COMP: CPT | Mod: 26,S$PBB,, | Performed by: NEUROMUSCULOSKELETAL MEDICINE & OMM

## 2019-08-01 PROCEDURE — 95910 NRV CNDJ TEST 7-8 STUDIES: CPT | Mod: 26,S$PBB,, | Performed by: NEUROMUSCULOSKELETAL MEDICINE & OMM

## 2019-08-01 PROCEDURE — 95910 PR NERVE CONDUCTION STUDY; 7-8 STUDIES: ICD-10-PCS | Mod: 26,S$PBB,, | Performed by: NEUROMUSCULOSKELETAL MEDICINE & OMM

## 2019-08-01 PROCEDURE — 95886 PR EMG COMPLETE, W/ NERVE CONDUCTION STUDIES, 5+ MUSCLES: ICD-10-PCS | Mod: 26,S$PBB,, | Performed by: NEUROMUSCULOSKELETAL MEDICINE & OMM

## 2019-08-02 ENCOUNTER — PATIENT MESSAGE (OUTPATIENT)
Dept: SPORTS MEDICINE | Facility: CLINIC | Age: 34
End: 2019-08-02

## 2019-08-02 ENCOUNTER — HOSPITAL ENCOUNTER (EMERGENCY)
Facility: HOSPITAL | Age: 34
Discharge: HOME | End: 2019-08-02
Attending: FAMILY MEDICINE
Payer: MEDICAID

## 2019-08-02 VITALS
TEMPERATURE: 98 F | OXYGEN SATURATION: 99 % | HEART RATE: 89 BPM | DIASTOLIC BLOOD PRESSURE: 64 MMHG | WEIGHT: 280 LBS | RESPIRATION RATE: 18 BRPM | SYSTOLIC BLOOD PRESSURE: 128 MMHG | HEIGHT: 62 IN | BODY MASS INDEX: 51.53 KG/M2

## 2019-08-02 DIAGNOSIS — S43.401A SPRAIN OF RIGHT SHOULDER, UNSPECIFIED SHOULDER SPRAIN TYPE, INITIAL ENCOUNTER: Primary | ICD-10-CM

## 2019-08-02 DIAGNOSIS — M25.579 ANKLE PAIN: ICD-10-CM

## 2019-08-02 DIAGNOSIS — M23.92 ACUTE INTERNAL DERANGEMENT OF KNEE, LEFT: ICD-10-CM

## 2019-08-02 PROCEDURE — 99284 EMERGENCY DEPT VISIT MOD MDM: CPT | Mod: 25,ER

## 2019-08-02 PROCEDURE — 25000003 PHARM REV CODE 250: Mod: ER | Performed by: FAMILY MEDICINE

## 2019-08-02 RX ORDER — NAPROXEN 500 MG/1
500 TABLET ORAL 2 TIMES DAILY WITH MEALS
Qty: 60 TABLET | Refills: 0 | Status: SHIPPED | OUTPATIENT
Start: 2019-08-02 | End: 2019-12-26

## 2019-08-02 RX ORDER — IBUPROFEN 400 MG/1
800 TABLET ORAL
Status: COMPLETED | OUTPATIENT
Start: 2019-08-02 | End: 2019-08-02

## 2019-08-02 RX ORDER — HYDROCODONE BITARTRATE AND ACETAMINOPHEN 5; 325 MG/1; MG/1
1 TABLET ORAL
Status: COMPLETED | OUTPATIENT
Start: 2019-08-02 | End: 2019-08-02

## 2019-08-02 RX ORDER — METHOCARBAMOL 500 MG/1
1000 TABLET, FILM COATED ORAL 3 TIMES DAILY
Qty: 30 TABLET | Refills: 0 | Status: SHIPPED | OUTPATIENT
Start: 2019-08-02 | End: 2019-08-07

## 2019-08-02 RX ADMIN — HYDROCODONE BITARTRATE AND ACETAMINOPHEN 1 TABLET: 5; 325 TABLET ORAL at 01:08

## 2019-08-02 RX ADMIN — IBUPROFEN 800 MG: 400 TABLET, FILM COATED ORAL at 01:08

## 2019-08-02 NOTE — ED NOTES
Current availabel knee immobilizer is too small for pt. narciso Solitario at [facility], called for available options at their facility. Awaiting call back.

## 2019-08-02 NOTE — ED PROVIDER NOTES
"Encounter Date: 2019       History     Chief Complaint   Patient presents with    Fall     Pt states was walking and slipped on rug.  States landed on right shoulder and "twisted" left ankle.  Pt states "whole leg hurts but it's managable, but my ankle hurts and I can't move it."       34-year-old female presents with chief complaint of a slip and fall.  Patient reports was at the local weighing restaurant which time she slipped and fell for injuring her right shoulder and left knee.  Patient also reports left ankle pain. Patient reports when she attempted to stand her feet left knee began to collapse.  Denies any chest pain or shortness of breath.        Review of patient's allergies indicates:   Allergen Reactions    Nuts [tree nut] Hives     [place] allergy     Past Medical History:   Diagnosis Date    Hypoglycemic disorder     Vertigo      Past Surgical History:   Procedure Laterality Date    ARTHROSCOPY, SHOULDER, WITH SUBACROMIAL SPACE DECOMPRESSION Right 3/1/2019    Performed by [facility] at [facility] OR     SECTION      DEBRIDEMENT, SHOULDER, ARTHROSCOPIC Right 3/1/2019    Performed by [facility] at [facility] OR    DILATION AND CURETTAGE OF UTERUS      GANGLION CYST EXCISION      REPAIR, ROTATOR CUFF, ARTHROSCOPIC Right 3/1/2019    Performed by [facility] at [facility] OR    TENODESIS ARTHROSCOPIC (TENDON FIXATION) Right 3/1/2019    Performed by [facility] at [facility] OR    TONSILLECTOMY       Family History   Problem Relation Age of Onset    Diabetes Mother     Hypertension Mother     Depression Mother     Hypertension Father     Heart disease Father     Cancer Maternal Aunt     Diabetes Paternal Grandfather      Social History     Tobacco Use    Smoking status: Former Smoker     Types: Cigarettes     Last attempt to quit: 2017     Years since quittin.4    Smokeless tobacco: Never Used   Substance Use Topics    Alcohol use: Yes     " Comment: occ    Drug use: No     Review of Systems   Constitutional: Negative for chills and fever.   Gastrointestinal: Negative for nausea.   Musculoskeletal: Positive for arthralgias. Negative for back pain.   All other systems reviewed and are negative.      Physical Exam     Initial Vitals [08/02/19 1240]   BP Pulse Resp Temp SpO2   131/69 95 18 98.9 °F (37.2 °C) 98 %      MAP       --         Physical Exam    Nursing note and vitals reviewed.  Constitutional: She appears well-developed and well-nourished.   HENT:   Head: Normocephalic and atraumatic.   Eyes: EOM are normal. Pupils are equal, round, and reactive to light.   Neck: Normal range of motion. Neck supple.   Cardiovascular: Normal rate, regular rhythm and normal heart sounds.   Pulmonary/Chest: Breath sounds normal.   Abdominal: Soft.   Musculoskeletal: Normal range of motion. She exhibits tenderness.        Left knee: She exhibits LCL laxity and abnormal meniscus. Tenderness found.   No definite stop noted to the LCL on the left   Neurological: She is oriented to person, place, and time. She has normal strength.         ED Course   Procedures  Labs Reviewed - No data to display       Imaging Results          X-Ray Ankle Complete Left (Final result)  Result time 08/02/19 13:07:01    Final result by Richi Marin MD (08/02/19 13:07:01)                 Impression:      No acute findings.      Electronically signed by: Richi Marin  Date:    08/02/2019  Time:    13:07             Narrative:    EXAMINATION:  XR ANKLE COMPLETE 3 VIEW LEFT    CLINICAL HISTORY:  Pain in unspecified ankle and joints of unspecified foot    TECHNIQUE:  Standard radiography performed.    COMPARISON:  None    FINDINGS:  Bone density and architecture are normal.  No acute findings.  Calcaneal spurs are present with a plantar spur measuring 0.6 cm.                               X-Ray Knee 1 or 2 View Left (Final result)  Result time 08/02/19 13:06:16    Final result by  Richi Marin MD (08/02/19 13:06:16)                 Impression:      Negative exam.      Electronically signed by: Richi Marin  Date:    08/02/2019  Time:    13:06             Narrative:    EXAMINATION:  XR KNEE 1 OR 2 VIEW LEFT    CLINICAL HISTORY:  Pain in unspecified knee    TECHNIQUE:  Standard radiography performed.    COMPARISON:  None    FINDINGS:  Bone density and architecture are normal.  No acute findings.                               X-ray Shoulder 2 or More Views Right (Final result)  Result time 08/02/19 13:05:58   Procedure changed from X-Ray Shoulder Trauma 3 view Right     Final result by Richi Marin MD (08/02/19 13:05:58)                 Impression:      No acute findings.      Electronically signed by: Richi Marin  Date:    08/02/2019  Time:    13:05             Narrative:    EXAMINATION:  XR SHOULDER COMPLETE 2 OR MORE VIEWS RIGHT    CLINICAL HISTORY:  shoulder pain;    TECHNIQUE:  Standard radiography performed.    COMPARISON:  01/07/2019    FINDINGS:  Bone density and architecture are normal.  Some chronic spurring of the acromioclavicular joint is present.  No acute findings.                                 Medical Decision Making:   Clinical Tests:   Radiological Study: Ordered and Reviewed  ED Management:  Patient has apparent severe strain and are tear of her left LCL because she has a right-sided shoulder sprain will be very difficult for her to ambulate with crutches so will use a wheelchair for now.                      Clinical Impression:       ICD-10-CM ICD-9-CM   1. Sprain of right shoulder, unspecified shoulder sprain type, initial encounter S43.401A 840.9   2. Ankle pain M25.579 719.47   3. Acute internal derangement of knee, left M23.92 717.9                                Cm Duarte MD  08/10/19 0634

## 2019-08-06 ENCOUNTER — PATIENT MESSAGE (OUTPATIENT)
Dept: ORTHOPEDICS | Facility: CLINIC | Age: 34
End: 2019-08-06

## 2019-08-07 ENCOUNTER — PATIENT MESSAGE (OUTPATIENT)
Dept: ORTHOPEDICS | Facility: CLINIC | Age: 34
End: 2019-08-07

## 2019-08-16 ENCOUNTER — OFFICE VISIT (OUTPATIENT)
Dept: ORTHOPEDICS | Facility: CLINIC | Age: 34
End: 2019-08-16
Payer: MEDICAID

## 2019-08-16 DIAGNOSIS — G89.29 CHRONIC RIGHT SHOULDER PAIN: Primary | ICD-10-CM

## 2019-08-16 DIAGNOSIS — S83.519A ACL TEAR: ICD-10-CM

## 2019-08-16 DIAGNOSIS — M25.562 KNEE PAIN, LEFT: ICD-10-CM

## 2019-08-16 DIAGNOSIS — M25.511 CHRONIC RIGHT SHOULDER PAIN: Primary | ICD-10-CM

## 2019-08-16 PROCEDURE — 99214 OFFICE O/P EST MOD 30 MIN: CPT | Mod: S$PBB,,, | Performed by: ORTHOPAEDIC SURGERY

## 2019-08-16 PROCEDURE — 99999 PR PBB SHADOW E&M-EST. PATIENT-LVL II: ICD-10-PCS | Mod: PBBFAC,,, | Performed by: ORTHOPAEDIC SURGERY

## 2019-08-16 PROCEDURE — 99212 OFFICE O/P EST SF 10 MIN: CPT | Mod: PBBFAC,PN | Performed by: ORTHOPAEDIC SURGERY

## 2019-08-16 PROCEDURE — 99214 PR OFFICE/OUTPT VISIT, EST, LEVL IV, 30-39 MIN: ICD-10-PCS | Mod: S$PBB,,, | Performed by: ORTHOPAEDIC SURGERY

## 2019-08-16 PROCEDURE — 99999 PR PBB SHADOW E&M-EST. PATIENT-LVL II: CPT | Mod: PBBFAC,,, | Performed by: ORTHOPAEDIC SURGERY

## 2019-08-16 NOTE — PROGRESS NOTES
CC: Right shoulder pain and left knee pain    June Weaver returns to clinic for evaluation of her right shoulder and left knee.  She had a new injury on 8/2/19 when she slipped on a rug and fell.  She was see in the emergency department and provided a knee immobilizer.    DATE OF SURGERY:3/1/2019        PREOPERATIVE DIAGNOSES:   1. Right shoulder rotator cuff tear (subscapularis / supraspinatus)   2. Right shoulder biceps tendinopathy  3. Right shoulder labral tear     POSTOPERATIVE DIAGNOSES:   1. Right shoulder rotator cuff tear  (subscapularis / supraspinatus)  2. Right shoulder biceps tendinopathy  3. Right shoulder labral tear     PROCEDURE:   1. Right shoulder arthroscopic rotator cuff repair (subscapularis)  2. Placement of a right shoulder Regeneten implant on posterosuperior rotator cuff  3. Right shoulder arthroscopic biceps autotenodesis  4. Right shoulder arthroscopic subacromial decompression.  5. Right shoulder arthroscopic debridement labrum      SURGEON: Sravan Antonio M.D.    Review of Systems   Constitution: Negative. Negative for chills, fever and night sweats.    Cardiovascular: Negative for chest pain and syncope.   Respiratory: Negative for cough and shortness of breath.    Gastrointestinal: Negative for abdominal pain and bowel incontinence.      PE:    LMP 07/23/2019      Right shoulder    Posterior portal incision and one anterior portal incision c/d/i  3 Anterior portals incisions with mild dehiscence with fibrinous tissue, no drainage- improving since last visit  No surrounding erythema with mild TTP  No fluctuance, induration  Compartments soft  Neurovascular status intact in extremity    PROM  Forward elevation 65 degrees  External rotation 20 degrees    EMG upper extremities (8/1/19): Normal studies    MRI Left knee - External at [facility] (8/6/19):  1. Complete ACL tear  2. Grade 1 sprain MCL   3. Intact menisci.   4. Minimal chondral irregularity medial and  patellofemoral compartments  5. Posterior capsular sprain    Assessment:  5 months s/p right shoulder RCR, new injury  Left knee pain, ACL tear     Plan:  1.  MRI without contrast right shoulder to evaluate for right rotator cuff tear  2. Follow up next week at [place] location.    3.  Patient will stop by [place] clinic on way home today to  T-scope brace.    4. Plan for ACL reconstruction.  Will discuss further next week at follow up.

## 2019-08-21 ENCOUNTER — HOSPITAL ENCOUNTER (OUTPATIENT)
Dept: RADIOLOGY | Facility: HOSPITAL | Age: 34
Discharge: HOME | End: 2019-08-21
Attending: ORTHOPAEDIC SURGERY
Payer: MEDICAID

## 2019-08-21 DIAGNOSIS — G89.29 CHRONIC RIGHT SHOULDER PAIN: ICD-10-CM

## 2019-08-21 DIAGNOSIS — M25.511 CHRONIC RIGHT SHOULDER PAIN: ICD-10-CM

## 2019-08-21 PROCEDURE — 73221 MRI SHOULDER WITHOUT CONTRAST RIGHT: ICD-10-PCS | Mod: 26,RT,, | Performed by: RADIOLOGY

## 2019-08-21 PROCEDURE — 73221 MRI JOINT UPR EXTREM W/O DYE: CPT | Mod: TC,RT

## 2019-08-21 PROCEDURE — 73221 MRI JOINT UPR EXTREM W/O DYE: CPT | Mod: 26,RT,, | Performed by: RADIOLOGY

## 2019-08-22 ENCOUNTER — OFFICE VISIT (OUTPATIENT)
Dept: SPORTS MEDICINE | Facility: CLINIC | Age: 34
End: 2019-08-22
Payer: MEDICAID

## 2019-08-22 ENCOUNTER — PATIENT MESSAGE (OUTPATIENT)
Dept: SPORTS MEDICINE | Facility: CLINIC | Age: 34
End: 2019-08-22

## 2019-08-22 VITALS
WEIGHT: 280 LBS | BODY MASS INDEX: 51.53 KG/M2 | HEIGHT: 62 IN | SYSTOLIC BLOOD PRESSURE: 132 MMHG | HEART RATE: 77 BPM | DIASTOLIC BLOOD PRESSURE: 83 MMHG

## 2019-08-22 DIAGNOSIS — M25.561 RIGHT KNEE PAIN, UNSPECIFIED CHRONICITY: ICD-10-CM

## 2019-08-22 DIAGNOSIS — S83.519A ACL TEAR: Primary | ICD-10-CM

## 2019-08-22 DIAGNOSIS — M25.512 LEFT SHOULDER PAIN: ICD-10-CM

## 2019-08-22 PROCEDURE — 20610 DRAIN/INJ JOINT/BURSA W/O US: CPT | Mod: PBBFAC,PO | Performed by: ORTHOPAEDIC SURGERY

## 2019-08-22 PROCEDURE — 99999 PR PBB SHADOW E&M-EST. PATIENT-LVL III: ICD-10-PCS | Mod: PBBFAC,,, | Performed by: ORTHOPAEDIC SURGERY

## 2019-08-22 PROCEDURE — 99214 OFFICE O/P EST MOD 30 MIN: CPT | Mod: 25,S$PBB,, | Performed by: ORTHOPAEDIC SURGERY

## 2019-08-22 PROCEDURE — 99999 PR PBB SHADOW E&M-EST. PATIENT-LVL III: CPT | Mod: PBBFAC,,, | Performed by: ORTHOPAEDIC SURGERY

## 2019-08-22 PROCEDURE — 99213 OFFICE O/P EST LOW 20 MIN: CPT | Mod: PBBFAC,PO,25 | Performed by: ORTHOPAEDIC SURGERY

## 2019-08-22 PROCEDURE — 99214 PR OFFICE/OUTPT VISIT, EST, LEVL IV, 30-39 MIN: ICD-10-PCS | Mod: 25,S$PBB,, | Performed by: ORTHOPAEDIC SURGERY

## 2019-08-22 PROCEDURE — 20610 LARGE JOINT ASPIRATION/INJECTION: L SUBACROMIAL BURSA: ICD-10-PCS | Mod: S$PBB,LT,, | Performed by: ORTHOPAEDIC SURGERY

## 2019-08-22 RX ORDER — TRIAMCINOLONE ACETONIDE 40 MG/ML
40 INJECTION, SUSPENSION INTRA-ARTICULAR; INTRAMUSCULAR
Status: DISCONTINUED | OUTPATIENT
Start: 2019-08-22 | End: 2019-08-22 | Stop reason: HOSPADM

## 2019-08-22 RX ADMIN — TRIAMCINOLONE ACETONIDE 40 MG: 40 INJECTION, SUSPENSION INTRA-ARTICULAR; INTRAMUSCULAR at 04:08

## 2019-08-22 NOTE — PROGRESS NOTES
"CC: Right shoulder pain and left knee pain    June Weaver returns to clinic for evaluation of her right shoulder and left knee.  She has completed her right shoulder MRI; to be discussed today.  She has been compliant with t-scope knee brace.    History of a new injury on 8/2/19 when she slipped on a rug and fell.  She was see in the emergency department and provided a knee immobilizer.    DATE OF SURGERY:3/1/2019       PROCEDURE:   1. Right shoulder arthroscopic rotator cuff repair (subscapularis)  2. Placement of a right shoulder Regeneten implant on posterosuperior rotator cuff  3. Right shoulder arthroscopic biceps autotenodesis  4. Right shoulder arthroscopic subacromial decompression.  5. Right shoulder arthroscopic debridement labrum      SURGEON: Sravan Antonio M.D.    Review of Systems   Constitution: Negative. Negative for chills, fever and night sweats.    Cardiovascular: Negative for chest pain and syncope.   Respiratory: Negative for cough and shortness of breath.    Gastrointestinal: Negative for abdominal pain and bowel incontinence.      PE:    /83   Pulse 77   Ht 5' 2" (1.575 m)   Wt 127 kg (280 lb)   LMP 07/23/2019   BMI 51.21 kg/m²      Right shoulder    Posterior portal incision and one anterior portal incision c/d/i  3 Anterior portals incisions with mild dehiscence with fibrinous tissue, no drainage- improving since last visit  No surrounding erythema with mild TTP  No fluctuance, induration  Compartments soft  Neurovascular status intact in extremity    AROM  Forward elevation 65 degrees  External rotation 20 degrees    Left knee    AROM: 0-90  +[place]    EMG upper extremities (8/1/19): Normal studies    MRI Left knee - [facility] at [facility] (8/6/19):  1. Complete ACL tear  2. Grade 1 sprain MCL   3. Intact menisci.   4. Minimal chondral irregularity medial and patellofemoral compartments  5. Posterior capsular sprain    MRI Right shoulder (8/21/19):  1. " Postsurgical changes of supraspinatus tendon repair with implant augmentation.  No imaging findings to suggest retear noting intrasubstance signal hyperintensity favored to be postoperative in nature.  2. Postsurgical changes of subscapularis repair without findings to suggest re-tear.  3. Status post superior labral debridement and biceps tenodesis.  4. Status post subacromial decompression.  5. Subacromial/subdeltoid bursitis.    Assessment:  5 months s/p right shoulder RCR, new injury  Left knee pain, ACL tear     Plan:  1. Cortisone injection right shoulder today  2. Plan for ACL reconstruction with soft tissue allograft at [facility].  She will need medical clearance from primary care provider.    Treatment options were discussed with the patient about her knee.  I reviewed the MRI images and relevant anatomy with the patient and what this means for her knee.    We discussed both non-operative and operative options for her knee and the risks and benefits of each.    I feel like he would benefit from ACL reconstruction for her knee.  After a discussion of specific risks and benefits, she would like to proceed with setting this up.      These risks include: loosening of the graft, disease transmission, instability or re-tear, bleeding, infection, scarring, damage to neurovascular structures, damage to cartilage, stiffness, blood clots, pulmonary embolism, swelling, compartment syndrome, need for further surgery, and the risks of anesthesia.      She verbalized her understanding of these risks and wished to proceed with surgery.    We discussed the common graft options including bone-patella tendon-bone and hamstring autografts, as well as the risks and benefits of allografts.    She verbalized her understanding of the different graft options and would like to use allograft tissue for her ACL reconstruction.    A total of 25 minutes were spent face-to-face with the patient during this encounter and over half of that  time was spent on counseling about treatment options including his choice for surgery, graft choice and coordination of her care for her preoperative visits, surgery and post-operative rehab.  We also had a detailed discussion of her  expectation level for this procedure as well as any limitations at home or work and with exercising following surgery.     The operative plan is:    right   1. ACL reconstruction with allograft - soft tissue  2. Possible arthroscopic meniscus repair  3. Possible arthroscopic meniscectomy  4. Possible arthroscopic cartilage repair  5. Possible arthroscopic debridement  6. Possible arthroscopic microfracture     Position: Supine    Patient will  need medical clearance prior to the pre-operative appointment.    If she wishes to proceed, we'll get her scheduled for this at her convenience around her work schedule.

## 2019-08-22 NOTE — PROCEDURES
Large Joint Aspiration/Injection: L subacromial bursa  Date/Time: 8/22/2019 4:05 PM  Performed by: Sravan Antonio MD  Authorized by: Sravan Antonio MD     Consent Done?:  Yes (Verbal)  Indications:  Pain  Procedure site marked: Yes    Timeout: Prior to procedure the correct patient, procedure, and site was verified      Location:  Shoulder  Site:  L subacromial bursa  Prep: Patient was prepped and draped in usual sterile fashion    Needle size:  22 G  Ultrasonic Guidance for needle placement: No  Approach:  Posterior  Medications:  40 mg triamcinolone acetonide 40 mg/mL  Patient tolerance:  Patient tolerated the procedure well with no immediate complications

## 2019-08-23 DIAGNOSIS — S83.512A RUPTURE OF ANTERIOR CRUCIATE LIGAMENT OF LEFT KNEE, INITIAL ENCOUNTER: Primary | ICD-10-CM

## 2019-08-27 ENCOUNTER — OUTSIDE PLACE OF SERVICE (OUTPATIENT)
Dept: CARDIOLOGY | Facility: CLINIC | Age: 34
End: 2019-08-27
Payer: MEDICAID

## 2019-08-27 PROCEDURE — 93010 ELECTROCARDIOGRAM REPORT: CPT | Mod: ,,, | Performed by: STUDENT IN AN ORGANIZED HEALTH CARE EDUCATION/TRAINING PROGRAM

## 2019-08-27 PROCEDURE — 93010 PR ELECTROCARDIOGRAM REPORT: ICD-10-PCS | Mod: ,,, | Performed by: STUDENT IN AN ORGANIZED HEALTH CARE EDUCATION/TRAINING PROGRAM

## 2019-09-04 ENCOUNTER — OFFICE VISIT (OUTPATIENT)
Dept: SPORTS MEDICINE | Facility: CLINIC | Age: 34
End: 2019-09-04
Payer: MEDICAID

## 2019-09-04 ENCOUNTER — PATIENT MESSAGE (OUTPATIENT)
Dept: ORTHOPEDICS | Facility: CLINIC | Age: 34
End: 2019-09-04

## 2019-09-04 VITALS
HEIGHT: 62 IN | DIASTOLIC BLOOD PRESSURE: 68 MMHG | WEIGHT: 280 LBS | SYSTOLIC BLOOD PRESSURE: 128 MMHG | HEART RATE: 88 BPM | BODY MASS INDEX: 51.53 KG/M2

## 2019-09-04 DIAGNOSIS — S83.512A RUPTURE OF ANTERIOR CRUCIATE LIGAMENT OF LEFT KNEE, INITIAL ENCOUNTER: Primary | ICD-10-CM

## 2019-09-04 PROCEDURE — 99214 OFFICE O/P EST MOD 30 MIN: CPT | Mod: PBBFAC,PO | Performed by: PHYSICIAN ASSISTANT

## 2019-09-04 PROCEDURE — 99999 PR PBB SHADOW E&M-EST. PATIENT-LVL IV: ICD-10-PCS | Mod: PBBFAC,,, | Performed by: PHYSICIAN ASSISTANT

## 2019-09-04 PROCEDURE — 99499 NO LOS: ICD-10-PCS | Mod: S$PBB,,, | Performed by: PHYSICIAN ASSISTANT

## 2019-09-04 PROCEDURE — 99499 UNLISTED E&M SERVICE: CPT | Mod: S$PBB,,, | Performed by: PHYSICIAN ASSISTANT

## 2019-09-04 PROCEDURE — 99999 PR PBB SHADOW E&M-EST. PATIENT-LVL IV: CPT | Mod: PBBFAC,,, | Performed by: PHYSICIAN ASSISTANT

## 2019-09-04 RX ORDER — SODIUM CHLORIDE 0.9 % (FLUSH) 0.9 %
10 SYRINGE (ML) INJECTION
Status: CANCELLED | OUTPATIENT
Start: 2019-09-04

## 2019-09-04 RX ORDER — ASPIRIN 325 MG
325 TABLET ORAL EVERY 12 HOURS
Qty: 42 TABLET | Refills: 0 | COMMUNITY
Start: 2019-09-04 | End: 2019-12-26 | Stop reason: ALTCHOICE

## 2019-09-04 RX ORDER — MUPIROCIN 20 MG/G
OINTMENT TOPICAL
Status: CANCELLED | OUTPATIENT
Start: 2019-09-04

## 2019-09-04 RX ORDER — ONDANSETRON 4 MG/1
4 TABLET, FILM COATED ORAL EVERY 8 HOURS PRN
Qty: 30 TABLET | Refills: 0 | Status: SHIPPED | OUTPATIENT
Start: 2019-09-04 | End: 2020-01-26 | Stop reason: CLARIF

## 2019-09-04 RX ORDER — OXYCODONE AND ACETAMINOPHEN 10; 325 MG/1; MG/1
1 TABLET ORAL EVERY 6 HOURS PRN
Qty: 28 TABLET | Refills: 0 | Status: SHIPPED | OUTPATIENT
Start: 2019-09-04 | End: 2019-12-26

## 2019-09-04 RX ORDER — TRAMADOL HYDROCHLORIDE 50 MG/1
50 TABLET ORAL EVERY 6 HOURS PRN
Qty: 28 TABLET | Refills: 0 | Status: SHIPPED | OUTPATIENT
Start: 2019-09-04 | End: 2019-09-11

## 2019-09-04 RX ORDER — PROMETHAZINE HYDROCHLORIDE 25 MG/1
25 TABLET ORAL EVERY 4 HOURS PRN
Qty: 30 TABLET | Refills: 0 | Status: SHIPPED | OUTPATIENT
Start: 2019-09-04 | End: 2019-12-26

## 2019-09-04 NOTE — H&P
"June Weaver  is here for a completion of her perioperative paperwork. she  Is scheduled to undergo right   1. ACL reconstruction with allograft - soft tissue  2. Possible arthroscopic meniscus repair  3. Possible arthroscopic meniscectomy  4. Possible arthroscopic cartilage repair  5. Possible arthroscopic debridement  6. Possible arthroscopic microfracture  on 2019.      She  does need clearance for this procedure.     Per, PCP, Irvin Baumann MD, "cleared for surgery"    Risks, indications and benefits of the surgical procedure were discussed with the patient. All questions with regard to surgery, rehab, expected return to functional activities, activities of daily living and recreational endeavors were answered to her satisfaction.    Patient was informed and understands the risks of surgery are greater for patients with a current condition or history of heart disease, obesity, clotting disorders, recurrent infections, steroid use, current or past smoking, and factors such as sedentary lifestyle and noncompliance with medications, therapy or follow-up. The degree of the increased risk is hard to estimate with any degree of precision.    Once no other questions were asked, a brief history and physical exam was then performed.    PAST MEDICAL HISTORY:   Past Medical History:   Diagnosis Date    Hypoglycemic disorder     Vertigo      PAST SURGICAL HISTORY:   Past Surgical History:   Procedure Laterality Date    ARTHROSCOPY, SHOULDER, WITH SUBACROMIAL SPACE DECOMPRESSION Right 3/1/2019    Performed by [facility] at [facility] OR     SECTION      DEBRIDEMENT, SHOULDER, ARTHROSCOPIC Right 3/1/2019    Performed by [facility] at [facility] OR    DILATION AND CURETTAGE OF UTERUS      GANGLION CYST EXCISION      REPAIR, ROTATOR CUFF, ARTHROSCOPIC Right 3/1/2019    Performed by [facility] at [facility] OR    TENODESIS ARTHROSCOPIC (TENDON FIXATION) Right 3/1/2019    Performed " by [facility] at [facility] OR    TONSILLECTOMY       FAMILY HISTORY:   Family History   Problem Relation Age of Onset    Diabetes Mother     Hypertension Mother     Depression Mother     Hypertension Father     Heart disease Father     Cancer Maternal Aunt     Diabetes Paternal Grandfather      SOCIAL HISTORY:   Social History     Socioeconomic History    Marital status:      Spouse name: Not on file    Number of children: Not on file    Years of education: Not on file    Highest education level: Not on file   Occupational History    Not on file   Social Needs    Financial resource strain: Not on file    Food insecurity:     Worry: Not on file     Inability: Not on file    Transportation needs:     Medical: Not on file     Non-medical: Not on file   Tobacco Use    Smoking status: Former Smoker     Types: Cigarettes     Last attempt to quit: 2017     Years since quittin.5    Smokeless tobacco: Never Used   Substance and Sexual Activity    Alcohol use: Yes     Comment: occ    Drug use: No    Sexual activity: Yes     Partners: Male     Birth control/protection: None   Lifestyle    Physical activity:     Days per week: Not on file     Minutes per session: Not on file    Stress: Not on file   Relationships    Social connections:     Talks on phone: Not on file     Gets together: Not on file     Attends L[employer]-day service: Not on file     Active member of club or organization: Not on file     Attends meetings of clubs or organizations: Not on file     Relationship status: Not on file   Other Topics Concern    Not on file   Social History Narrative    Not on file       MEDICATIONS:   Current Outpatient Medications:     escitalopram oxalate (LEXAPRO) 10 MG tablet, Take 30 mg by mouth once daily. , Disp: , Rfl:     naproxen (NAPROSYN) 500 MG tablet, Take 1 tablet (500 mg total) by mouth 2 (two) times daily with meals., Disp: 60 tablet, Rfl: 0    omeprazole (PRILOSEC) 40 MG  capsule, Take 40 mg by mouth once daily., Disp: , Rfl:     ondansetron (ZOFRAN) 8 MG tablet, Take 1 tablet (8 mg total) by mouth every 8 (eight) hours as needed for Nausea., Disp: 21 tablet, Rfl: 0    oxyCODONE-acetaminophen (PERCOCET)  mg per tablet, Take 1 tablet by mouth every 6 (six) hours as needed. Take stool softener with this medication, Disp: 28 tablet, Rfl: 0    prenatal vit/iron fum/folic ac (PRENATAL S ORAL), Take 1 tablet by mouth once daily., Disp: , Rfl:     promethazine (PHENERGAN) 25 MG tablet, Take 1 tablet (25 mg total) by mouth every 6 (six) hours as needed for Nausea., Disp: 40 tablet, Rfl: 0    rOPINIRole (REQUIP) 1 MG tablet, Take 1 mg by mouth every evening. , Disp: , Rfl:     spironolactone (ALDACTONE) 100 MG tablet, Take 100 mg by mouth 2 (two) times daily., Disp: , Rfl:     traMADol (ULTRAM) 50 mg tablet, Take 1 tablet (50 mg total) by mouth every 8 (eight) hours as needed for Pain., Disp: 40 tablet, Rfl: 0    traZODone (DESYREL) 150 MG tablet, Take 150 mg by mouth every evening., Disp: , Rfl:     aspirin (ECOTRIN) 325 MG EC tablet, Take 1 tablet (325 mg total) by mouth 2 (two) times daily. Take an antacid with medication. for 42 doses, Disp: 42 tablet, Rfl: 0  ALLERGIES:   Review of patient's allergies indicates:   Allergen Reactions    Nuts [tree nut] Hives     [place] allergy       Review of Systems   Constitution: Negative. Negative for chills, fever and night sweats.   HENT: Negative for congestion and headaches.    Eyes: Negative for blurred vision, left vision loss and right vision loss.   Cardiovascular: Negative for chest pain and syncope.   Respiratory: Negative for cough and shortness of breath.    Endocrine: Negative for polydipsia, polyphagia and polyuria.   Hematologic/Lymphatic: Negative for bleeding problem. Does not bruise/bleed easily.   Skin: Negative for dry skin, itching and rash.   Musculoskeletal: Negative for falls and muscle weakness.    Gastrointestinal: Negative for abdominal pain and bowel incontinence.   Genitourinary: Negative for bladder incontinence and nocturia.   Neurological: Negative for disturbances in coordination, loss of balance and seizures.   Psychiatric/Behavioral: Negative for depression. The patient does not have insomnia.    Allergic/Immunologic: Negative for hives and persistent infections.     PHYSICAL EXAM:  GEN: A&Ox3, WD WN NAD  HEENT: WNL  CHEST: CTAB, no W/R/R  HEART: RRR, no M/R/G   ABD: Soft, NT ND, BS x4 QUADS  MS: Refer to previous note for detailed MS exam  NEURO: CN II-XII intact       The surgical consent was then reviewed with the patient, who agreed with all the contents of the consent form and it was signed. she was then given the [facility] surgery packet to bring with her to [facility] for the anesthesia portion of her perioperative paperwork.     PHYSICAL THERAPY:  She was also instructed regarding physical therapy and will begin POD # 1-3. She was given a copy of the original prescription to schedule. Another copy of this prescription was also faxed to [facility].    POST OP CARE:instructions were reviewed including care of the wound and dressing after surgery and when she can shower.     PAIN MANAGEMENT: June Weaver was also given a pain management regime, which includes the TENS unit given to her by Arnav Burr along with the education required for its use. She was also instructed regarding the Polar ice unit that will be in place after surgery and her postoperative pain medications.     PAIN MEDICATION:  Percocet 10/325mg 1 po q 4-6 hours prn pain  Ultram 50 mg one p.o. q.4-6 hours p.r.n. breakthrough pain,   Phenergan 25 mg one p.o. q.4-6 hours p.r.n. nausea and vomiting.  Aspirin 325mg daily x 6 weeks for DVT prophylaxis starting on the evening after surgery.    Patient denies history of seizures.     Patient will also use bilateral TEDs on lower extremities, SCDs during surgery, and early ambulation  post-op. If the patient was previously taking 81mg baby aspirin, they were told to not take it will using the above stated aspirin and to restart the 81mg aspirin after completion of the aspirin dose.       Patient was also told to buy over the counter Prilosec medication and take it once daily for GI protection as long as they are taking NSAIDs or Aspirin.    DVT prophylaxis was discussed with the patient today including risk factors for developing DVTs and history of DVTs. The patient was asked if any specific recommendations were given from the doctor/s that did pre-operative surgical clearance.      Patient was asked if they were taking or using OCP pills or devices. If they answered yes, then they were instructed to stop using OCPs at this pre-operative appointment until 2 months post-op to help prevent DVT development. They understand that there are other forms of birth control that do not involve hormones. They expressed understanding that ignoring/not following this instruction could result in a DVT which could turn into a deadly pulmonary embolism.      The patient was told that narcotic pain medications may make them drowsy and instructions were given to not sign legal documents, drive or operate heavy machinery, cars, or equipment while under the influence of narcotic medications.     As there were no other questions to be asked, she was given my business card along with Sravan Antonio MD business card if she has any questions or concerns prior to surgery or in the postop period.

## 2019-09-05 ENCOUNTER — TELEPHONE (OUTPATIENT)
Dept: SPORTS MEDICINE | Facility: CLINIC | Age: 34
End: 2019-09-05

## 2019-09-06 PROBLEM — S83.512A LEFT ANTERIOR CRUCIATE LIGAMENT TEAR: Status: ACTIVE | Noted: 2019-09-06

## 2019-09-07 ENCOUNTER — TELEPHONE (OUTPATIENT)
Dept: SPORTS MEDICINE | Facility: CLINIC | Age: 34
End: 2019-09-07

## 2019-09-07 PROBLEM — G47.00 INSOMNIA: Status: ACTIVE | Noted: 2019-09-07

## 2019-09-07 PROBLEM — K21.9 GASTROESOPHAGEAL REFLUX DISEASE WITHOUT ESOPHAGITIS: Status: ACTIVE | Noted: 2019-09-07

## 2019-09-07 NOTE — TELEPHONE ENCOUNTER
The patient's nursing team at [facility] contacted the [facility] transfer line today about her status. Kept overnight there for pain control s/p ACL recon with meniscus repair with my colleague Dr. Sravan patel. Pain is well controlled. Medically stable but having difficulty moving around. Not able to use bariatric walker. Doesn't feel safe going home. BMI 55. They need her regular meds ordered as well. I do not have privileges at this hospital and recommended that they contact the hospitalist on call for assistance.  BID for DVT prophylaxis. May d/c home once stable and ready from a PT perspective. If she doesn't make progress with that today, will need SW help for possible Rehab facility placement.

## 2019-09-09 ENCOUNTER — TELEPHONE (OUTPATIENT)
Dept: SPORTS MEDICINE | Facility: CLINIC | Age: 34
End: 2019-09-09

## 2019-09-09 NOTE — TELEPHONE ENCOUNTER
Returning call to patient.  Answered post-operative questions about showering, dressing changes, post op physical therapy, bracing, etc.  She will call back with further questions.

## 2019-09-09 NOTE — TELEPHONE ENCOUNTER
----- Message from Mee Tavarez sent at 9/9/2019  3:17 PM CDT -----  Contact: self @ [phone]  Pt says she had surgery on 9-6-19 and was admitted at [facility] right after surgery due to some set backs.  They plan to discharge her today.  Pt says she has some questions.  Pls call asap.

## 2019-09-10 ENCOUNTER — TELEPHONE (OUTPATIENT)
Dept: SPORTS MEDICINE | Facility: CLINIC | Age: 34
End: 2019-09-10

## 2019-09-10 DIAGNOSIS — M25.562 CHRONIC PAIN OF LEFT KNEE: ICD-10-CM

## 2019-09-10 DIAGNOSIS — G89.29 CHRONIC RIGHT SHOULDER PAIN: ICD-10-CM

## 2019-09-10 DIAGNOSIS — Z98.890 S/P ACL RECONSTRUCTION: Primary | ICD-10-CM

## 2019-09-10 DIAGNOSIS — G89.29 CHRONIC PAIN OF LEFT KNEE: ICD-10-CM

## 2019-09-10 DIAGNOSIS — M25.511 CHRONIC RIGHT SHOULDER PAIN: ICD-10-CM

## 2019-09-19 ENCOUNTER — OFFICE VISIT (OUTPATIENT)
Dept: SPORTS MEDICINE | Facility: CLINIC | Age: 34
End: 2019-09-19
Payer: MEDICAID

## 2019-09-19 ENCOUNTER — HOSPITAL ENCOUNTER (OUTPATIENT)
Dept: RADIOLOGY | Facility: HOSPITAL | Age: 34
Discharge: HOME | End: 2019-09-19
Attending: PHYSICIAN ASSISTANT
Payer: MEDICAID

## 2019-09-19 VITALS — HEIGHT: 62 IN | BODY MASS INDEX: 53.92 KG/M2 | WEIGHT: 293 LBS

## 2019-09-19 DIAGNOSIS — M25.562 LEFT KNEE PAIN, UNSPECIFIED CHRONICITY: Primary | ICD-10-CM

## 2019-09-19 DIAGNOSIS — M25.562 LEFT KNEE PAIN, UNSPECIFIED CHRONICITY: ICD-10-CM

## 2019-09-19 DIAGNOSIS — Z98.890 S/P ACL RECONSTRUCTION: ICD-10-CM

## 2019-09-19 PROCEDURE — 99024 PR POST-OP FOLLOW-UP VISIT: ICD-10-PCS | Mod: ,,, | Performed by: PHYSICIAN ASSISTANT

## 2019-09-19 PROCEDURE — 99213 OFFICE O/P EST LOW 20 MIN: CPT | Mod: PBBFAC,25,PO | Performed by: PHYSICIAN ASSISTANT

## 2019-09-19 PROCEDURE — 99024 POSTOP FOLLOW-UP VISIT: CPT | Mod: ,,, | Performed by: PHYSICIAN ASSISTANT

## 2019-09-19 PROCEDURE — 73560 X-RAY EXAM OF KNEE 1 OR 2: CPT | Mod: TC,FY,PO,LT

## 2019-09-19 PROCEDURE — 99999 PR PBB SHADOW E&M-EST. PATIENT-LVL III: CPT | Mod: PBBFAC,,, | Performed by: PHYSICIAN ASSISTANT

## 2019-09-19 PROCEDURE — 73560 XR KNEE 1 OR 2 VIEW LEFT: ICD-10-PCS | Mod: 26,LT,, | Performed by: RADIOLOGY

## 2019-09-19 PROCEDURE — 99999 PR PBB SHADOW E&M-EST. PATIENT-LVL III: ICD-10-PCS | Mod: PBBFAC,,, | Performed by: PHYSICIAN ASSISTANT

## 2019-09-19 PROCEDURE — 73560 X-RAY EXAM OF KNEE 1 OR 2: CPT | Mod: 26,LT,, | Performed by: RADIOLOGY

## 2019-09-19 RX ORDER — TRAMADOL HYDROCHLORIDE 50 MG/1
50 TABLET ORAL EVERY 6 HOURS
COMMUNITY
End: 2019-12-26

## 2019-09-19 NOTE — PROGRESS NOTES
"CC: post op 2 weeks,  Left knee ACL Reconstruction and lateral meniscus repair. Pain today is 4-5/10. Denies nausea, vomiting, fever, chills, CP, and SOB. She is no longer taking percocet for pain. However, she is still taking Tramadol as needed for pain. She has been attending formal PT 2x a week at [facility] in [facility]. She reports that she has been weight bearing as tolerated with a walker post surgery. She also reports doing a lot of full weight bearing exercises while at PT. T-scope brace is set from -10 degrees to 120 degrees ROM.     Date of Procedure: 9/6/2019   Procedure: Procedure(s) (LRB):  ARTHROSCOPY, KNEE (Left)  RECONSTRUCTION, LIGAMENT (Left)  REPAIR, MENISCUS, KNEE (Left)  ARTHROSCOPY, KNEE, WITH CHONDROPLASTY (Left)      Surgeon(s) and Role:     * Sravan Antonio MD - Primary     Assisting Surgeon:   Stephanie Finn    Pain well tolerated on pain medication      Review of Systems   Constitution: Negative. Negative for chills, fever and night sweats.    Cardiovascular: Negative for chest pain and syncope.   Respiratory: Negative for cough and shortness of breath.    Gastrointestinal: Negative for abdominal pain and bowel incontinence.    PE:    Ht 5' 2" (1.575 m)   Wt (!) 137.4 kg (303 lb)   LMP 08/29/2019 (Exact Date)   BMI 55.42 kg/m²      Incision clean/dry/intact. Removed dermabond prineo and trimmed suture tags.   No sign of infection  Minimal to no effusion  Compartments soft  Calves soft and not tender bilateral  Neurovascular status intact in extremity  Decreased quad strength  Straight leg raise completed with 5 degree extension lag.   ROM is 0-90    Radiographs:  Left knee:  FINDINGS:  Postoperative changes of ACL repair identified.  The position alignment is satisfactory.  No fracture or bone destruction identified    Assessment:  Appropriate ACL surgery recovery at 2 weeks    Plan:  1. Cont PT at this point; HEP demonstrated emphasizing ROM, Quad/hamstring sets, patellar " mobilization exercises, obtaining full extension  2. Pain medication as needed for PT; wean off narcotic use  3. Continue crutches and maintain TTWB to 25 % weight bearing at this time. Continue T-scope brace at this time and T-scope adjusted to 90 degrees on ROM today  4. Post op protocol provided to patient to give to therapist and also faxed to [facility]. Explained that patient has been overworking her post-op knee. Explained that she needs to decrease weight bearing and should not be completing full weight bearing exercises at PT at this time. NO ROM past 90 degrees until 6 weeks post op.  5. Return to clinic in 4 weeks for 6 week post op follow up with Dr. Antonio

## 2019-09-25 ENCOUNTER — TELEPHONE (OUTPATIENT)
Dept: GASTROENTEROLOGY | Facility: CLINIC | Age: 34
End: 2019-09-25

## 2019-09-25 ENCOUNTER — PATIENT MESSAGE (OUTPATIENT)
Dept: GASTROENTEROLOGY | Facility: CLINIC | Age: 34
End: 2019-09-25

## 2019-09-25 NOTE — TELEPHONE ENCOUNTER
----- Message from Edilma Felder sent at 9/25/2019 11:48 AM CDT -----  Contact: self / [phone]  Patient is returning a call from your office. Please advise

## 2019-10-08 ENCOUNTER — PATIENT MESSAGE (OUTPATIENT)
Dept: ORTHOPEDICS | Facility: CLINIC | Age: 34
End: 2019-10-08

## 2019-10-09 ENCOUNTER — TELEPHONE (OUTPATIENT)
Dept: ORTHOPEDICS | Facility: CLINIC | Age: 34
End: 2019-10-09

## 2019-10-09 DIAGNOSIS — Z98.890 S/P ROTATOR CUFF REPAIR: ICD-10-CM

## 2019-10-09 DIAGNOSIS — Z98.890 S/P ACL RECONSTRUCTION: ICD-10-CM

## 2019-10-09 DIAGNOSIS — M25.562 CHRONIC PAIN OF LEFT KNEE: ICD-10-CM

## 2019-10-09 DIAGNOSIS — G89.29 CHRONIC PAIN OF LEFT KNEE: ICD-10-CM

## 2019-10-09 DIAGNOSIS — M25.511 RIGHT SHOULDER PAIN, UNSPECIFIED CHRONICITY: Primary | ICD-10-CM

## 2019-10-09 NOTE — TELEPHONE ENCOUNTER
Patient sent message saying she has not been attending physical therapy.  She is still in her post op period for ACL reconstruction and meniscus repair.    Called [facility] to see what is going on.  They claim she continues to cancel her appointments with them.  Her last attended visit was on 10/3/19.

## 2019-10-28 ENCOUNTER — PATIENT MESSAGE (OUTPATIENT)
Dept: ORTHOPEDICS | Facility: CLINIC | Age: 34
End: 2019-10-28

## 2019-10-30 ENCOUNTER — OFFICE VISIT (OUTPATIENT)
Dept: GASTROENTEROLOGY | Facility: CLINIC | Age: 34
End: 2019-10-30
Payer: MEDICAID

## 2019-10-30 VITALS
BODY MASS INDEX: 54.8 KG/M2 | DIASTOLIC BLOOD PRESSURE: 82 MMHG | HEART RATE: 106 BPM | WEIGHT: 293 LBS | SYSTOLIC BLOOD PRESSURE: 132 MMHG

## 2019-10-30 DIAGNOSIS — K59.04 CHRONIC IDIOPATHIC CONSTIPATION: Primary | ICD-10-CM

## 2019-10-30 DIAGNOSIS — R10.84 ABDOMINAL PAIN, GENERALIZED: ICD-10-CM

## 2019-10-30 PROCEDURE — 99203 PR OFFICE/OUTPT VISIT, NEW, LEVL III, 30-44 MIN: ICD-10-PCS | Mod: S$PBB,,, | Performed by: INTERNAL MEDICINE

## 2019-10-30 PROCEDURE — 99999 PR PBB SHADOW E&M-EST. PATIENT-LVL III: ICD-10-PCS | Mod: PBBFAC,,, | Performed by: INTERNAL MEDICINE

## 2019-10-30 PROCEDURE — 99999 PR PBB SHADOW E&M-EST. PATIENT-LVL III: CPT | Mod: PBBFAC,,, | Performed by: INTERNAL MEDICINE

## 2019-10-30 PROCEDURE — 99203 OFFICE O/P NEW LOW 30 MIN: CPT | Mod: S$PBB,,, | Performed by: INTERNAL MEDICINE

## 2019-10-30 PROCEDURE — 99213 OFFICE O/P EST LOW 20 MIN: CPT | Mod: PBBFAC,PO | Performed by: INTERNAL MEDICINE

## 2019-10-30 RX ORDER — LUBIPROSTONE 24 UG/1
24 CAPSULE ORAL 2 TIMES DAILY WITH MEALS
Qty: 180 CAPSULE | Refills: 3 | Status: SHIPPED | OUTPATIENT
Start: 2019-10-30 | End: 2021-04-22

## 2019-10-30 RX ORDER — LACTULOSE 10 G/15ML
20 SOLUTION ORAL 3 TIMES DAILY
Qty: 500 ML | Refills: 3 | Status: SHIPPED | OUTPATIENT
Start: 2019-10-30 | End: 2020-01-14 | Stop reason: CLARIF

## 2019-10-30 NOTE — PROGRESS NOTES
Subjective:       Patient ID: June Weaver is a 34 y.o. female.    Chief Complaint: Constipation    33 yo F complains of constipation and abdominal pain.  She has had constipation for a few years; prior to that she had no issues having BM.  She just had a colonoscopy with Dr. Gao 1 year ago and states she was told it was normal.  She was given Linzess 145 mcg by her PCP.  She used it for 10 days and had numerous BM every day, which did not improve with time, so she had to quit the medication.  She is currently on Miralax daily which does not help at all.  She frequently has to manually disimpact herself or use perineal pressure in order to have a BM.  When she eats, she feels very bloated and gets fullness and pain in her upper abdomen.  She sees bleeding on occasion and has h/o external hemorrhoids.  With the colonoscopy she states she had a good prep and felt very good for one week after cleaning out her colon, but then she began having problems again.  She states when she does have a BM, it smells terrible and is very dark.  She drinks coffee which will help her have a BM, but most days she has small pebble-like stools.    Review of Systems   Constitutional: Negative for appetite change and unexpected weight change.   Eyes: Negative for photophobia and visual disturbance.   Respiratory: Negative for chest tightness, shortness of breath and wheezing.    Cardiovascular: Negative for chest pain, palpitations and leg swelling.   Genitourinary: Negative for dysuria, flank pain and hematuria.   Musculoskeletal: Negative for joint swelling and myalgias.   Skin: Negative for color change and rash.   Neurological: Negative for dizziness and speech difficulty.   Psychiatric/Behavioral: Negative for confusion and hallucinations.       Objective:       /82 (BP Location: Right arm, Patient Position: Sitting, BP Method: X-Large (Manual))   Pulse 106   Wt 135.9 kg (299 lb 9.6 oz)   BMI 54.80 kg/m²     Physical  "Exam   Constitutional: She is oriented to person, place, and time. She appears well-developed and well-nourished.   Eyes: Pupils are equal, round, and reactive to light. EOM are normal.   Neck: Normal range of motion. Neck supple.   Cardiovascular: Normal rate and regular rhythm.   Pulmonary/Chest: Effort normal and breath sounds normal.   Abdominal: Soft. Bowel sounds are normal. She exhibits no distension and no mass. There is no tenderness. There is no rebound and no guarding.   Musculoskeletal: Normal range of motion. She exhibits no edema.   Neurological: She is alert and oriented to person, place, and time.   Psychiatric: She has a normal mood and affect. Her behavior is normal. Judgment and thought content normal.       Assessment:       1. Chronic idiopathic constipation    2. Abdominal pain, generalized        Plan:       Chronic idiopathic constipation  -     lubiprostone (AMITIZA) 24 MCG Cap; Take 1 capsule (24 mcg total) by mouth 2 (two) times daily with meals.  Dispense: 180 capsule; Refill: 3  -     lactulose 10 gram/15 ml (CHRONULAC) 10 gram/15 mL (15 mL) solution; Take 30 mLs (20 g total) by mouth 3 (three) times daily.  Dispense: 500 mL; Refill: 3.  This is a "rescue" med.    Abdominal pain, generalized        -     Highly likely related to constipation.  Will evaluate further if not improved after constipation improved.         "

## 2019-10-30 NOTE — PATIENT INSTRUCTIONS
Constipation (Adult)  Constipation means that you have bowel movements that are less frequent than usual. Stools often become very hard and difficult to pass.  Constipation is very common. At some point in life it affects almost everyone. Since everyone's bowel habits are different, what is constipation to one person may not be to another. Your healthcare provider may do tests to diagnose constipation. It depends on what he or she finds when evaluating you.    Symptoms of constipation include:  · Abdominal pain  · Bloating  · Vomiting  · Painful bowel movements  · Itching, swelling, bleeding, or pain around the anus  Causes  Constipation can have many causes. These include:  · Diet low in fiber  · Too much dairy  · Not drinking enough liquids  · Lack of exercise or physical activity. This is especially true for older adults.  · Changes in lifestyle or daily routine, including pregnancy, aging, work, and travel  · Frequent use or misuse of laxatives  · Ignoring the urge to have a bowel movement or delaying it until later  · Medicines, such as certain prescription pain medicines, iron supplements, antacids, certain antidepressants, and calcium supplements  · Diseases like irritable bowel syndrome, bowel obstructions, stroke, diabetes, thyroid disease, Parkinson disease, hemorrhoids, and colon cancer  Complications  Potential complications of constipation can include:  · Hemorrhoids  · Rectal bleeding from hemorrhoids or anal fissures (skin tears)  · Hernias  · Dependency on laxatives  · Chronic constipation  · Fecal impaction  · Bowel obstruction or perforation  Home care  All treatment should be done after talking with your healthcare provider. This is especially true if you have another medical problems, are taking prescription medicines, or are an older adult. Treatment most often involves lifestyle changes. You may also need medicines. Your healthcare provider will tell you which will work best for you. Follow  the advice below to help avoid this problem in the future.  Lifestyle changes  These lifestyle changes can help prevent constipation:  · Diet. Eat a high-fiber diet, with fresh fruit and vegetables, and reduce dairy intake, meats, and processed foods  · Fluids. It's important to get enough fluids each day. Drink plenty of water when you eat more fiber. If you are on diet that limits the amount of fluid you can have, talk about this with your healthcare provider.  · Regular exercise. Check with your healthcare provider first.  Medications  Take any medicines as directed. Some laxatives are safe to use only every now and then. Others can be taken on a regular basis. Talk with your doctor or pharmacist if you have questions.  Prescription pain medicines can cause constipation. If you are taking this kind of medicine, ask your healthcare provider if you should also take a stool softener.  Medicines you may take to treat constipation include:  · Fiber supplements  · Stool softeners  · Laxatives  · Enemas  · Rectal suppositories  Follow-up care  Follow up with your healthcare provider if symptoms don't get better in the next few days. You may need to have more tests or see a specialist.  Call 911  Call 911 if any of these occur:  · Trouble breathing  · Stiff, rigid abdomen that is severely painful to touch  · Confusion  · Fainting or loss of consciousness  · Rapid heart rate  · Chest pain  When to seek medical advice  Call your healthcare provider right away if any of these occur:  · Fever over 100.4°F (38°C)  · Failure to resume normal bowel movements  · Pain in your abdomen or back gets worse  · Nausea or vomiting  · Swelling in your abdomen  · Blood in the stool  · Black, tarry stool  · Involuntary weight loss  · Weakness  Date Last Reviewed: 12/30/2015  © 7128-7509 [employer]. [address] [place] [address], [place] [address]. All rights reserved. This information is not intended as a substitute for  professional medical care. Always follow your healthcare professional's instructions.

## 2019-11-01 ENCOUNTER — PATIENT MESSAGE (OUTPATIENT)
Dept: ORTHOPEDICS | Facility: CLINIC | Age: 34
End: 2019-11-01

## 2019-11-08 ENCOUNTER — OFFICE VISIT (OUTPATIENT)
Dept: ORTHOPEDICS | Facility: CLINIC | Age: 34
End: 2019-11-08
Payer: MEDICAID

## 2019-11-08 DIAGNOSIS — G89.29 CHRONIC RIGHT SHOULDER PAIN: ICD-10-CM

## 2019-11-08 DIAGNOSIS — R60.0 LOWER EXTREMITY EDEMA: ICD-10-CM

## 2019-11-08 DIAGNOSIS — Z98.890 S/P ACL RECONSTRUCTION: Primary | ICD-10-CM

## 2019-11-08 DIAGNOSIS — M25.511 CHRONIC RIGHT SHOULDER PAIN: ICD-10-CM

## 2019-11-08 PROCEDURE — 99212 OFFICE O/P EST SF 10 MIN: CPT | Mod: PBBFAC,PN | Performed by: ORTHOPAEDIC SURGERY

## 2019-11-08 PROCEDURE — 99024 PR POST-OP FOLLOW-UP VISIT: ICD-10-PCS | Mod: ,,, | Performed by: ORTHOPAEDIC SURGERY

## 2019-11-08 PROCEDURE — 99999 PR PBB SHADOW E&M-EST. PATIENT-LVL II: CPT | Mod: PBBFAC,,, | Performed by: ORTHOPAEDIC SURGERY

## 2019-11-08 PROCEDURE — 99999 PR PBB SHADOW E&M-EST. PATIENT-LVL II: ICD-10-PCS | Mod: PBBFAC,,, | Performed by: ORTHOPAEDIC SURGERY

## 2019-11-08 PROCEDURE — 99024 POSTOP FOLLOW-UP VISIT: CPT | Mod: ,,, | Performed by: ORTHOPAEDIC SURGERY

## 2019-11-08 NOTE — PROGRESS NOTES
"CC: post op 2 months    June Weaver returns to clinic for follow up evaluation of her left knee and right shoulder.  She continues physical therapy at [facility]; therapist reports noncompliance with walking or non-weightbearing status.  Therapist also reports noncompliance with HEP.  She received a right shoulder steroid injection on 8/22/19; reports it has worn off.       DATE OF PROCEDURE:  9/6/2019        PROCEDURES:  1. Left knee anterior cruciate ligament reconstruction with soft tissue allograft   2. Left knee lateral meniscus repair  3. Left knee arthroscopic chondroplasty     SURGEON: Sravan Antonio M.D.    DATE OF SURGERY:3/1/2019        PREOPERATIVE DIAGNOSES:   1. Right shoulder rotator cuff tear (subscapularis / supraspinatus)   2. Right shoulder biceps tendinopathy  3. Right shoulder labral tear     POSTOPERATIVE DIAGNOSES:   1. Right shoulder rotator cuff tear  (subscapularis / supraspinatus)  2. Right shoulder biceps tendinopathy  3. Right shoulder labral tear        PROCEDURE:   1. Right shoulder arthroscopic rotator cuff repair (subscapularis)  2. Placement of a right shoulder Regeneten implant on posterosuperior rotator cuff  3. Right shoulder arthroscopic biceps autotenodesis  4. Right shoulder arthroscopic subacromial decompression.  5. Right shoulder arthroscopic debridement labrum      SURGEON: Sravan Antonio M.D.     Review of Systems   Constitution: Negative. Negative for chills, fever and night sweats.    Cardiovascular: Negative for chest pain and syncope.   Respiratory: Negative for cough and shortness of breath.    Gastrointestinal: Negative for abdominal pain and bowel incontinence.     PE:     Ht 5' 2" (1.575 m)   Wt (!) 137.4 kg (303 lb)   LMP 08/29/2019 (Exact Date)   BMI 55.42 kg/m²       Incision clean/dry/intact.    No sign of infection  Minimal to no effusion  Compartments soft  Calves soft and not tender bilateral  Neurovascular status intact in " extremity  Decreased quad strength  ROM is 0-90    MRI Right shoulder (8/21/19):   1. Postsurgical changes of supraspinatus tendon repair with implant augmentation.  No imaging findings to suggest retear noting intrasubstance signal hyperintensity favored to be postoperative in nature.  2. Postsurgical changes of subscapularis repair without findings to suggest re-tear.  3. Status post superior labral debridement and biceps tenodesis.  4. Status post subacromial decompression.  5. Subacromial/subdeltoid bursitis.      Assessment:   2 months s/p ACL reconstruction  Right shoulder pain, s/p RCR     Plan:  1. US to rule out DVT   2. Continue PT   3. Follow up 2 months

## 2019-11-11 ENCOUNTER — HOSPITAL ENCOUNTER (OUTPATIENT)
Dept: RADIOLOGY | Facility: HOSPITAL | Age: 34
Discharge: HOME | End: 2019-11-11
Attending: ORTHOPAEDIC SURGERY
Payer: MEDICAID

## 2019-11-11 DIAGNOSIS — Z98.890 S/P ACL RECONSTRUCTION: ICD-10-CM

## 2019-11-11 DIAGNOSIS — R60.0 LOWER EXTREMITY EDEMA: ICD-10-CM

## 2019-11-11 PROCEDURE — 93971 EXTREMITY STUDY: CPT | Mod: TC,PO,LT

## 2019-12-04 ENCOUNTER — PATIENT MESSAGE (OUTPATIENT)
Dept: ORTHOPEDICS | Facility: CLINIC | Age: 34
End: 2019-12-04

## 2019-12-08 ENCOUNTER — PATIENT MESSAGE (OUTPATIENT)
Dept: ORTHOPEDICS | Facility: CLINIC | Age: 34
End: 2019-12-08

## 2019-12-15 ENCOUNTER — PATIENT MESSAGE (OUTPATIENT)
Dept: GASTROENTEROLOGY | Facility: CLINIC | Age: 34
End: 2019-12-15

## 2019-12-15 DIAGNOSIS — K92.1 MELENA: Primary | ICD-10-CM

## 2019-12-17 NOTE — TELEPHONE ENCOUNTER
She needs an EGD.  Continue all the other meds, including Amitiza and Protonix, but stop the Naprosyn.  EGD case request done, please schedule for her.

## 2019-12-30 ENCOUNTER — TELEPHONE (OUTPATIENT)
Dept: SPORTS MEDICINE | Facility: CLINIC | Age: 34
End: 2019-12-30

## 2019-12-30 ENCOUNTER — PATIENT MESSAGE (OUTPATIENT)
Dept: ORTHOPEDICS | Facility: CLINIC | Age: 34
End: 2019-12-30

## 2020-01-02 PROBLEM — K21.9 GERD (GASTROESOPHAGEAL REFLUX DISEASE): Status: ACTIVE | Noted: 2020-01-02

## 2020-01-10 ENCOUNTER — HOSPITAL ENCOUNTER (EMERGENCY)
Facility: HOSPITAL | Age: 35
Discharge: HOME | End: 2020-01-11
Attending: EMERGENCY MEDICINE
Payer: COMMERCIAL

## 2020-01-10 ENCOUNTER — PATIENT MESSAGE (OUTPATIENT)
Dept: GASTROENTEROLOGY | Facility: CLINIC | Age: 35
End: 2020-01-10

## 2020-01-10 ENCOUNTER — OFFICE VISIT (OUTPATIENT)
Dept: ORTHOPEDICS | Facility: CLINIC | Age: 35
End: 2020-01-10
Payer: COMMERCIAL

## 2020-01-10 DIAGNOSIS — M25.562 CHRONIC PAIN OF LEFT KNEE: ICD-10-CM

## 2020-01-10 DIAGNOSIS — T78.40XA ALLERGIC REACTION, INITIAL ENCOUNTER: Primary | ICD-10-CM

## 2020-01-10 DIAGNOSIS — G89.29 CHRONIC PAIN OF LEFT KNEE: ICD-10-CM

## 2020-01-10 DIAGNOSIS — M25.511 RIGHT SHOULDER PAIN, UNSPECIFIED CHRONICITY: ICD-10-CM

## 2020-01-10 DIAGNOSIS — M25.512 LEFT SHOULDER PAIN, UNSPECIFIED CHRONICITY: Primary | ICD-10-CM

## 2020-01-10 PROCEDURE — 99291 CRITICAL CARE FIRST HOUR: CPT | Mod: ,,, | Performed by: EMERGENCY MEDICINE

## 2020-01-10 PROCEDURE — 99212 OFFICE O/P EST SF 10 MIN: CPT | Mod: PBBFAC,PN | Performed by: ORTHOPAEDIC SURGERY

## 2020-01-10 PROCEDURE — 99214 OFFICE O/P EST MOD 30 MIN: CPT | Mod: S$GLB,,, | Performed by: ORTHOPAEDIC SURGERY

## 2020-01-10 PROCEDURE — 96361 HYDRATE IV INFUSION ADD-ON: CPT

## 2020-01-10 PROCEDURE — 96374 THER/PROPH/DIAG INJ IV PUSH: CPT

## 2020-01-10 PROCEDURE — 96372 THER/PROPH/DIAG INJ SC/IM: CPT

## 2020-01-10 PROCEDURE — 99999 PR PBB SHADOW E&M-EST. PATIENT-LVL II: CPT | Mod: PBBFAC,,, | Performed by: ORTHOPAEDIC SURGERY

## 2020-01-10 PROCEDURE — 99284 EMERGENCY DEPT VISIT MOD MDM: CPT | Mod: 25,27

## 2020-01-10 PROCEDURE — 99999 PR PBB SHADOW E&M-EST. PATIENT-LVL II: ICD-10-PCS | Mod: PBBFAC,,, | Performed by: ORTHOPAEDIC SURGERY

## 2020-01-10 PROCEDURE — 96375 TX/PRO/DX INJ NEW DRUG ADDON: CPT

## 2020-01-10 PROCEDURE — 99291 PR CRITICAL CARE, E/M 30-74 MINUTES: ICD-10-PCS | Mod: ,,, | Performed by: EMERGENCY MEDICINE

## 2020-01-10 PROCEDURE — 99214 PR OFFICE/OUTPT VISIT, EST, LEVL IV, 30-39 MIN: ICD-10-PCS | Mod: S$GLB,,, | Performed by: ORTHOPAEDIC SURGERY

## 2020-01-10 RX ORDER — DIPHENHYDRAMINE HYDROCHLORIDE 50 MG/ML
25 INJECTION INTRAMUSCULAR; INTRAVENOUS
Status: COMPLETED | OUTPATIENT
Start: 2020-01-11 | End: 2020-01-11

## 2020-01-10 RX ORDER — EPINEPHRINE 0.3 MG/.3ML
0.3 INJECTION SUBCUTANEOUS
Status: COMPLETED | OUTPATIENT
Start: 2020-01-11 | End: 2020-01-11

## 2020-01-10 RX ORDER — METHYLPREDNISOLONE SOD SUCC 125 MG
125 VIAL (EA) INJECTION
Status: COMPLETED | OUTPATIENT
Start: 2020-01-11 | End: 2020-01-11

## 2020-01-10 NOTE — PROGRESS NOTES
"CC: Bilateral shoulder and left knee pain    June Weaver returns to clinic for follow up evaluation of her left knee and bilateral shoulder.  She has not been compliant with physical therapy since last visit due to the holidays and her son breaking his leg.  Since last visit she complains of left shoulder pain.       DATE OF PROCEDURE:  9/6/2019        PROCEDURES:  1. Left knee anterior cruciate ligament reconstruction with soft tissue allograft   2. Left knee lateral meniscus repair  3. Left knee arthroscopic chondroplasty     SURGEON: Sravan Antonio M.D.    DATE OF SURGERY:3/1/2019        PREOPERATIVE DIAGNOSES:   1. Right shoulder rotator cuff tear (subscapularis / supraspinatus)   2. Right shoulder biceps tendinopathy  3. Right shoulder labral tear     POSTOPERATIVE DIAGNOSES:   1. Right shoulder rotator cuff tear  (subscapularis / supraspinatus)  2. Right shoulder biceps tendinopathy  3. Right shoulder labral tear        PROCEDURE:   1. Right shoulder arthroscopic rotator cuff repair (subscapularis)  2. Placement of a right shoulder Regeneten implant on posterosuperior rotator cuff  3. Right shoulder arthroscopic biceps autotenodesis  4. Right shoulder arthroscopic subacromial decompression.  5. Right shoulder arthroscopic debridement labrum      SURGEON: Sravan Antonio M.D.     Review of Systems   Constitution: Negative. Negative for chills, fever and night sweats.    Cardiovascular: Negative for chest pain and syncope.   Respiratory: Negative for cough and shortness of breath.    Gastrointestinal: Negative for abdominal pain and bowel incontinence.     PE:     Ht 5' 2" (1.575 m)   Wt (!) 137.4 kg (303 lb)   LMP 08/29/2019 (Exact Date)   BMI 55.42 kg/m²       Incision clean/dry/intact.    No sign of infection  Minimal to no effusion  Compartments soft  Calves soft and not tender bilateral  Neurovascular status intact in extremity  Decreased quad strength  ROM is 0-90    MRI Right shoulder " (8/21/19):   1. Postsurgical changes of supraspinatus tendon repair with implant augmentation.  No imaging findings to suggest retear noting intrasubstance signal hyperintensity favored to be postoperative in nature.  2. Postsurgical changes of subscapularis repair without findings to suggest re-tear.  3. Status post superior labral debridement and biceps tenodesis.  4. Status post subacromial decompression.  5. Subacromial/subdeltoid bursitis.      Assessment:   6 months s/p ACL reconstruction left knee  10 months, s/p RCR right shoulder  Left shoulder pain     Plan:  1. Physical therapy at [facility] for left shoulder and left knee  2. Follow up at [facility] in 2 months

## 2020-01-11 VITALS
HEART RATE: 73 BPM | TEMPERATURE: 98 F | RESPIRATION RATE: 16 BRPM | OXYGEN SATURATION: 97 % | DIASTOLIC BLOOD PRESSURE: 67 MMHG | SYSTOLIC BLOOD PRESSURE: 120 MMHG

## 2020-01-11 PROCEDURE — 63600175 PHARM REV CODE 636 W HCPCS: Performed by: PHYSICIAN ASSISTANT

## 2020-01-11 PROCEDURE — 25000003 PHARM REV CODE 250: Performed by: PHYSICIAN ASSISTANT

## 2020-01-11 PROCEDURE — 25000003 PHARM REV CODE 250: Performed by: EMERGENCY MEDICINE

## 2020-01-11 RX ORDER — DIPHENHYDRAMINE HCL 50 MG
50 CAPSULE ORAL EVERY 6 HOURS PRN
Qty: 20 CAPSULE | Refills: 0 | Status: SHIPPED | OUTPATIENT
Start: 2020-01-11 | End: 2022-07-11

## 2020-01-11 RX ORDER — PREDNISONE 50 MG/1
50 TABLET ORAL DAILY
Qty: 10 TABLET | Refills: 0 | Status: ON HOLD | OUTPATIENT
Start: 2020-01-11 | End: 2020-01-30 | Stop reason: HOSPADM

## 2020-01-11 RX ORDER — FAMOTIDINE 20 MG/1
20 TABLET, FILM COATED ORAL 2 TIMES DAILY
Qty: 14 TABLET | Refills: 0 | Status: SHIPPED | OUTPATIENT
Start: 2020-01-11 | End: 2020-02-06

## 2020-01-11 RX ORDER — ACETAMINOPHEN 500 MG
1000 TABLET ORAL
Status: COMPLETED | OUTPATIENT
Start: 2020-01-11 | End: 2020-01-11

## 2020-01-11 RX ORDER — EPINEPHRINE 0.3 MG/.3ML
2 INJECTION SUBCUTANEOUS
Qty: 2 EACH | Refills: 1 | Status: ON HOLD | OUTPATIENT
Start: 2020-01-11 | End: 2020-01-30 | Stop reason: SDUPTHER

## 2020-01-11 RX ADMIN — ACETAMINOPHEN 1000 MG: 500 TABLET ORAL at 04:01

## 2020-01-11 RX ADMIN — SODIUM CHLORIDE 1000 ML: 0.9 INJECTION, SOLUTION INTRAVENOUS at 12:01

## 2020-01-11 RX ADMIN — EPINEPHRINE 0.3 MG: 0.3 INJECTION INTRAMUSCULAR at 12:01

## 2020-01-11 RX ADMIN — DIPHENHYDRAMINE HYDROCHLORIDE 25 MG: 50 INJECTION, SOLUTION INTRAMUSCULAR; INTRAVENOUS at 12:01

## 2020-01-11 RX ADMIN — METHYLPREDNISOLONE SODIUM SUCCINATE 125 MG: 125 INJECTION, POWDER, FOR SOLUTION INTRAMUSCULAR; INTRAVENOUS at 12:01

## 2020-01-11 NOTE — ED PROVIDER NOTES
Encounter Date: 1/10/2020       History     Chief Complaint   Patient presents with    Allergic Reaction     patient was at a friend's house less than five minutes and her eyes got teary, and her throat felt like it was closing up.  Airway narrowed at triage, eyes tearing and hives on face and hands     34-year-old female with no applicable past medical history presents for sensation of throat closing and itching after being exposed to dog dander at a friend's house.  Patient reports that she has allergies to walnuts (hives) and has previously had itching in response to pet dander.  Approximately 10 min PTA she started to feel as if her throat was closing with cough, associated eye itching and watering.  She is unsure whether not she feels short of breath, she denies wheezing, abdominal pain or nausea/vomiting.        Review of patient's allergies indicates:   Allergen Reactions    Nuts [tree nut] Hives     [place] allergy     Past Medical History:   Diagnosis Date    Hypoglycemic disorder     Vertigo      Past Surgical History:   Procedure Laterality Date    ARTHROSCOPIC CHONDROPLASTY OF KNEE JOINT Left 9/6/2019    Procedure: ARTHROSCOPY, KNEE, WITH CHONDROPLASTY;  Surgeon: Sravan Antonio MD;  Location: [facility] OR;  Service: Orthopedics;  Laterality: Left;    ARTHROSCOPIC DEBRIDEMENT OF SHOULDER Right 3/1/2019    Procedure: DEBRIDEMENT, SHOULDER, ARTHROSCOPIC;  Surgeon: Sravan Antonio MD;  Location: [facility] OR;  Service: Orthopedics;  Laterality: Right;  labral debridement    ARTHROSCOPIC REPAIR OF ROTATOR CUFF OF SHOULDER Right 3/1/2019    Procedure: REPAIR, ROTATOR CUFF, ARTHROSCOPIC;  Surgeon: Sravan Antonio MD;  Location: [facility] OR;  Service: Orthopedics;  Laterality: Right;  subscapularis  Regeneten implant    ARTHROSCOPY OF KNEE Left 9/6/2019    Procedure: ARTHROSCOPY, KNEE;  Surgeon: Sravan Antonio MD;  Location: [facility] OR;  Service: Orthopedics;  Laterality: Left;  regional w/o catheter  / adductor     ARTHROSCOPY OF SHOULDER WITH DECOMPRESSION OF SUBACROMIAL SPACE Right 3/1/2019    Procedure: ARTHROSCOPY, SHOULDER, WITH SUBACROMIAL SPACE DECOMPRESSION;  Surgeon: Sravan Antonio MD;  Location: [facility] OR;  Service: Orthopedics;  Laterality: Right;     SECTION      DILATION AND CURETTAGE OF UTERUS      ESOPHAGOGASTRODUODENOSCOPY N/A 2020    Procedure: EGD (ESOPHAGOGASTRODUODENOSCOPY);  Surgeon: Erinn Brenner MD;  Location: [facility] ENDO;  Service: Endoscopy;  Laterality: N/A;    GANGLION CYST EXCISION      RECONSTRUCTION OF LIGAMENT Left 2019    Procedure: RECONSTRUCTION, LIGAMENT;  Surgeon: Sravan Antonio MD;  Location: [facility] OR;  Service: Orthopedics;  Laterality: Left;  ACL    REPAIR OF MENISCUS OF KNEE Left 2019    Procedure: REPAIR, MENISCUS, KNEE;  Surgeon: Sravan Antonio MD;  Location: [facility] OR;  Service: Orthopedics;  Laterality: Left;  lateral    TONSILLECTOMY       Family History   Problem Relation Age of Onset    Diabetes Mother     Hypertension Mother     Depression Mother     Hypertension Father     Heart disease Father     Cancer Maternal Aunt     Diabetes Paternal Grandfather      Social History     Tobacco Use    Smoking status: Former Smoker     Types: Cigarettes     Last attempt to quit: 2017     Years since quittin.8    Smokeless tobacco: Never Used   Substance Use Topics    Alcohol use: Yes     Comment: occ    Drug use: No     Review of Systems   Constitutional: Negative for fever.   HENT: Positive for voice change. Negative for sore throat.    Eyes: Positive for discharge and itching.   Respiratory: Positive for cough. Negative for shortness of breath, wheezing and stridor.    Cardiovascular: Negative for chest pain.   Gastrointestinal: Negative for abdominal pain, nausea and vomiting.   Genitourinary: Negative for dysuria.   Musculoskeletal: Negative for back pain.   Skin: Negative for rash.   Allergic/Immunologic:  Positive for environmental allergies.   Neurological: Negative for weakness.   Hematological: Does not bruise/bleed easily.       Physical Exam     Initial Vitals [01/10/20 2352]   BP Pulse Resp Temp SpO2   (!) 154/83 105 16 97.5 °F (36.4 °C) 98 %      MAP       --         Physical Exam    Nursing note and vitals reviewed.  Constitutional: She appears well-developed and well-nourished. She is not diaphoretic. No distress.   HENT:   Head: Normocephalic and atraumatic.   Mouth/Throat: Uvula is midline and oropharynx is clear and moist. No oral lesions. No uvula swelling.   No facial or intraoral swelling   Eyes: EOM are normal. Pupils are equal, round, and reactive to light.   Neck: Normal range of motion. Neck supple.   Voice is hoarse but no stridor   Cardiovascular: Normal rate, regular rhythm, normal heart sounds and intact distal pulses. Exam reveals no gallop and no friction rub.    No murmur heard.  Pulmonary/Chest: Breath sounds normal. No stridor. No respiratory distress. She has no wheezes. She has no rhonchi. She has no rales. She exhibits no tenderness.   Abdominal: Soft. Bowel sounds are normal. She exhibits no distension and no mass. There is no tenderness. There is no rebound and no guarding.   Musculoskeletal: Normal range of motion.   Neurological: She is alert and oriented to person, place, and time.   Skin: Skin is warm and dry. No rash noted.   No rash   Psychiatric: She has a normal mood and affect.         ED Course   Procedures  Labs Reviewed - No data to display       Imaging Results    None          Medical Decision Making:   History:   Old Medical Records: I decided to obtain old medical records.  Old Records Summarized: records from previous admission(s).       <> Summary of Records: Patient admitted on 01/02/2019 for EGD  Initial Assessment:   34-year-old female presenting for sensation of throat closing and itching after exposure to pet dander.  Endorsing throat closing sensation.  He is  hypertensive and mildly tachycardic with otherwise normal vitals.  Her voice is hoarse but she does not have any stridor.  No visible facial or intraoral swelling.  Lungs CTA.  No rash.  ED Management:  Given patient sensation of throat closing and recent onset will give Benadryl, Solu-Medrol, epinephrine, fluids and reassess.    Patient endorses improvement of symptoms after therapy.  On reassessment, she is not showing any signs of airway compromise.  Will continue to monitor.    Patient periodically re-evaluated, voice has normalized.  Anticipate discharge attending 6 hr observation.  I discussed this patient my supervising physician and I am signing out to Anna Gonsalves MD.    5:13 AM  Ann Hernandez PA-C                Attending Attestation:     Physician Attestation Statement for NP/PA:   I have conducted a face to face encounter with this patient in addition to the NP/PA, due to Medical Complexity    Other NP/PA Attestation Additions:      Medical Decision Makin-year-old female presenting to emergency department with complaint of difficulty breathing.  I am concerned for anaphylaxis.  Patient was given epi, steroids, Benadryl, and Pepcid.  She was observed for a period of 6 hr in the emergency department.  Her symptoms of throat scratchiness and bronchospastic cough improved gradually throughout that time.  She was re-evaluated frequently, personally by myself, and also by my physician's assistant throughout that time.  Her vitals remained stable. She had no additional issues including vomiting, abdominal pain, diarrhea, or rash.  There is no face tongue or lip swelling noted.  There is no stridor noted at any point.  At the time of discharge, she was stable, had no complaints.  She was discharged home with steroid burst, Benadryl, Pepcid, and epi pens.  She is instructed in use of epi pens.  Return precautions were discussed at bedside.     Attending Critical Care:   Critical Care Times:    ==============================================================  · Total Critical Care Time - exclusive of procedural time: 45 minutes.  ==============================================================  Critical care was necessary to treat or prevent imminent or life-threatening deterioration of the following conditions: anaphylaxis.                             Clinical Impression:       ICD-10-CM ICD-9-CM   1. Allergic reaction, initial encounter T78.40XA 995.3                             Ann Hernandez PA-C  01/11/20 0513       Anna Gonsalves MD  01/11/20 0650

## 2020-01-11 NOTE — DISCHARGE INSTRUCTIONS
"You were seen in the emergency department for an allergic reaction.    Diagnosis: Allergic reaction    Please read the provided information about allergic reactions. Symptoms of a severe allergic reaction include:  - Skin rashes, itching and/or hives  - Swelling of the face, lips, tongue or throat  - Shortness of breath, trouble breathing, wheezing (whistling sound during breathing)  - Dizziness and/or fainting  - Stomach pain or cramps, vomiting or diarrhea  - Feeling like something awful is about to happen    Treatments were:  Medications   diphenhydrAMINE injection 25 mg (25 mg Intravenous Given 1/11/20 0007)   methylPREDNISolone sodium succinate injection 125 mg (125 mg Intravenous Given 1/11/20 0007)   sodium chloride 0.9% bolus 1,000 mL (1,000 mLs Intravenous New Bag 1/11/20 0009)   EPINEPHrine (EPIPEN) 0.3 mg/0.3 mL pen injection 0.3 mg (0.3 mg Intramuscular Given 1/11/20 0008)       Home Care Instructions:  - Take the prescribed steroid medication as directed  - Take Benadryl according to packaging directions for symptoms including itchy rash  - If you have symptoms of a severe allergic reaction ("anaphylaxis"), use the prescribed epinephrine autoinjector and return to the ER immediately.  - Medications: Continue taking home medications as prescribed    Follow-Up Plan:  - Follow-up with: Primary care doctor within 3 - 5 days  - Additional outpatient testing and/or evaluation as directed by your doctor  - If you continue to have allergy issues, you may benefit from seeing an allergist    Return to the emergency department for symptoms including but not limited to: worsening symptoms, symptoms of severe allergic reaction listed above, shortness of breath or chest pain, vomiting with inability to hold down fluids, fevers greater than 100.4°F, passing out/unconsciousness, or other concerning symptoms.  "

## 2020-01-11 NOTE — ED TRIAGE NOTES
June Reyes, an 34 y.o. female presents to the ED for a possible allergic reaction. Pt reports going to a friend house where there  were a lot of dogs. Pt states as soon as she went into the house her eyes and throat got itchy. Denies itching, hives, SOB, chest pain, N/V.    Review of patient's allergies indicates:   Allergen Reactions    Nuts [tree nut] Hives     [place] allergy     Chief Complaint   Patient presents with    Allergic Reaction     patient was at a friend's house less than five minutes and her eyes got teary, and her throat felt like it was closing up.  Airway narrowed at triage, eyes tearing and hives on face and hands     Past Medical History:   Diagnosis Date    Hypoglycemic disorder     Vertigo        Patient identifiers verified and correct.     LOC: The patient is awake, alert and aware of environment with an appropriate affect, the patient is oriented x 3 and speaking appropriately.     APPEARANCE: Patient appears comfortable and in no acute distress, patient is clean and well groomed.    HEENT: Head symmetrical. Eyes bilateral.  Bilateral ears without drainage. Bilateral nares patent, throat clear.    SKIN: The skin is warm and dry, color consistent with ethnicity, patient has normal skin turgor and moist mucus membranes, skin intact, no breakdown or bruising noted.     MUSCULOSKELETAL: Patient moving all extremities spontaneously, no swelling noted.    RESPIRATORY: Airway is open and patent, respirations are spontaneous, patient has a normal effort and rate, no accessory muscle use noted.     CARDIAC: Patient has a normal rate and regular rhythm, no edema noted, capillary refill < 3 seconds.     GASTRO: Abdomen soft and non-distended.    NEURO: Pt opens eyes spontaneously pupils equal, round, and reactive. behavior appropriate to situation, follows commands, facial expression symmetrical,    NEUROVASCULAR: All extremities are warm and pink.     SOCIAL: Patient is accompanied by friends.    Will continue to monitor.

## 2020-01-14 ENCOUNTER — HOSPITAL ENCOUNTER (EMERGENCY)
Facility: HOSPITAL | Age: 35
Discharge: HOME | End: 2020-01-14
Attending: EMERGENCY MEDICINE
Payer: COMMERCIAL

## 2020-01-14 VITALS
SYSTOLIC BLOOD PRESSURE: 131 MMHG | HEART RATE: 98 BPM | BODY MASS INDEX: 53.73 KG/M2 | TEMPERATURE: 99 F | HEIGHT: 62 IN | OXYGEN SATURATION: 98 % | RESPIRATION RATE: 20 BRPM | DIASTOLIC BLOOD PRESSURE: 67 MMHG | WEIGHT: 292 LBS

## 2020-01-14 DIAGNOSIS — T78.40XA ALLERGIC REACTION, INITIAL ENCOUNTER: Primary | ICD-10-CM

## 2020-01-14 PROCEDURE — S0028 INJECTION, FAMOTIDINE, 20 MG: HCPCS | Mod: ER | Performed by: PHYSICIAN ASSISTANT

## 2020-01-14 PROCEDURE — 25000003 PHARM REV CODE 250: Mod: ER | Performed by: PHYSICIAN ASSISTANT

## 2020-01-14 PROCEDURE — 96375 TX/PRO/DX INJ NEW DRUG ADDON: CPT | Mod: 59,ER

## 2020-01-14 PROCEDURE — 63600175 PHARM REV CODE 636 W HCPCS: Mod: ER | Performed by: PHYSICIAN ASSISTANT

## 2020-01-14 PROCEDURE — 99284 EMERGENCY DEPT VISIT MOD MDM: CPT | Mod: 25,ER

## 2020-01-14 PROCEDURE — 96374 THER/PROPH/DIAG INJ IV PUSH: CPT | Mod: ER

## 2020-01-14 RX ORDER — FAMOTIDINE 10 MG/ML
40 INJECTION INTRAVENOUS 2 TIMES DAILY
Status: DISCONTINUED | OUTPATIENT
Start: 2020-01-14 | End: 2020-01-14

## 2020-01-14 RX ORDER — FAMOTIDINE 10 MG/ML
20 INJECTION INTRAVENOUS ONCE
Status: DISCONTINUED | OUTPATIENT
Start: 2020-01-14 | End: 2020-01-15 | Stop reason: HOSPADM

## 2020-01-14 RX ORDER — METHYLPREDNISOLONE SOD SUCC 125 MG
125 VIAL (EA) INJECTION
Status: COMPLETED | OUTPATIENT
Start: 2020-01-14 | End: 2020-01-14

## 2020-01-14 RX ORDER — FAMOTIDINE 10 MG/ML
20 INJECTION INTRAVENOUS 2 TIMES DAILY
Status: DISCONTINUED | OUTPATIENT
Start: 2020-01-14 | End: 2020-01-14

## 2020-01-14 RX ADMIN — FAMOTIDINE 20 MG: 10 INJECTION, SOLUTION INTRAVENOUS at 08:01

## 2020-01-14 RX ADMIN — METHYLPREDNISOLONE SODIUM SUCCINATE 125 MG: 125 INJECTION, POWDER, FOR SOLUTION INTRAMUSCULAR; INTRAVENOUS at 08:01

## 2020-01-15 NOTE — ED PROVIDER NOTES
"Encounter Date: 1/14/2020       History     Chief Complaint   Patient presents with    Allergic Reaction     Pt to Er states "I feel like my throat is closing up and tight." Pt reports allergic reaction to peanuts last week and tonight cooked in peanut oil. Pt reports eating at 6 and reaction started about 7p. Pt reports to taking prednisone, benadryl, and pepcid already today.     Patient a 34-year-old female who presents to ED with complaints of I feel like my throat is closing up.  Patient reports that approximately 4 days PTA, she had allergic reaction to peanuts.  She has a known allergic reaction to walnuts, but that was the 1st time she had an allergy to peanuts.  She states that tonight she cooked in peanut oil.  She reports that she ate the food approximately 6:00 p.m., 2 hr PTA and then started having reaction about 7:00 p.m. open (1 hr prior to arrival).  She states that she initially started feeling itchy and NV can feel like her throat was closing and she could not stop coughing.  She states that she took 50 mg of Benadryl and 20 mg of Pepcid prior to arrival.  He denies any wheezing, abdominal pain chest pain shortness of breath, tongue facial swelling, rash.      The history is provided by the patient.     Review of patient's allergies indicates:   Allergen Reactions    Nuts [tree nut] Hives     [place] allergy, Peanut allergy     Past Medical History:   Diagnosis Date    Hypoglycemic disorder     Vertigo      Past Surgical History:   Procedure Laterality Date    ARTHROSCOPIC CHONDROPLASTY OF KNEE JOINT Left 9/6/2019    Procedure: ARTHROSCOPY, KNEE, WITH CHONDROPLASTY;  Surgeon: Sravan Antonio MD;  Location: [facility] OR;  Service: Orthopedics;  Laterality: Left;    ARTHROSCOPIC DEBRIDEMENT OF SHOULDER Right 3/1/2019    Procedure: DEBRIDEMENT, SHOULDER, ARTHROSCOPIC;  Surgeon: Sravan Antonio MD;  Location: [facility] OR;  Service: Orthopedics;  Laterality: Right;  labral debridement    " ARTHROSCOPIC REPAIR OF ROTATOR CUFF OF SHOULDER Right 3/1/2019    Procedure: REPAIR, ROTATOR CUFF, ARTHROSCOPIC;  Surgeon: Sravan Antonio MD;  Location: [facility] OR;  Service: Orthopedics;  Laterality: Right;  subscapularis  Regeneten implant    ARTHROSCOPY OF KNEE Left 2019    Procedure: ARTHROSCOPY, KNEE;  Surgeon: Sravan Antonio MD;  Location: [facility] OR;  Service: Orthopedics;  Laterality: Left;  regional w/o catheter / adductor     ARTHROSCOPY OF SHOULDER WITH DECOMPRESSION OF SUBACROMIAL SPACE Right 3/1/2019    Procedure: ARTHROSCOPY, SHOULDER, WITH SUBACROMIAL SPACE DECOMPRESSION;  Surgeon: Sravan Antonio MD;  Location: [facility] OR;  Service: Orthopedics;  Laterality: Right;     SECTION      DILATION AND CURETTAGE OF UTERUS      ESOPHAGOGASTRODUODENOSCOPY N/A 2020    Procedure: EGD (ESOPHAGOGASTRODUODENOSCOPY);  Surgeon: Erinn Brenner MD;  Location: [facility] ENDO;  Service: Endoscopy;  Laterality: N/A;    GANGLION CYST EXCISION      RECONSTRUCTION OF LIGAMENT Left 2019    Procedure: RECONSTRUCTION, LIGAMENT;  Surgeon: Sravan Antonio MD;  Location: [facility] OR;  Service: Orthopedics;  Laterality: Left;  ACL    REPAIR OF MENISCUS OF KNEE Left 2019    Procedure: REPAIR, MENISCUS, KNEE;  Surgeon: Sravan Antonio MD;  Location: [facility] OR;  Service: Orthopedics;  Laterality: Left;  lateral    TONSILLECTOMY       Family History   Problem Relation Age of Onset    Diabetes Mother     Hypertension Mother     Depression Mother     Hypertension Father     Heart disease Father     Cancer Maternal Aunt     Diabetes Paternal Grandfather      Social History     Tobacco Use    Smoking status: Former Smoker     Types: Cigarettes     Last attempt to quit: 2017     Years since quittin.8    Smokeless tobacco: Never Used   Substance Use Topics    Alcohol use: Yes     Comment: occ    Drug use: No     Review of Systems   Constitutional: Negative for fever.   HENT:  Positive for voice change. Negative for ear discharge, ear pain, facial swelling, sneezing, sore throat and trouble swallowing.         Sensation of throat closing   Eyes: Negative for redness and visual disturbance.   Respiratory: Negative for chest tightness, shortness of breath and wheezing.    Cardiovascular: Negative for chest pain, palpitations and leg swelling.   Gastrointestinal: Negative for abdominal pain, nausea and vomiting.   Musculoskeletal: Negative for back pain, myalgias and neck pain.   Skin: Negative for color change and rash.   Neurological: Negative for dizziness, weakness and light-headedness.       Physical Exam     Initial Vitals [01/14/20 2010]   BP Pulse Resp Temp SpO2   125/89 83 (!) 24 98.9 °F (37.2 °C) 98 %      MAP       --         Physical Exam    Nursing note and vitals reviewed.  Constitutional: She appears well-developed and well-nourished. She is not diaphoretic. No distress.   HENT:   Head: Normocephalic and atraumatic.   No facial or lip swelling. No trismus. No oropharyngeal edema. Uvula midline, without edema. Airway intact. No tongue fullness/edema. Tolerating secretions. Voice is normal, without hoarseness.    Eyes: Conjunctivae and EOM are normal. Pupils are equal, round, and reactive to light.   Neck: Normal range of motion. Neck supple. No tracheal deviation present.   Cardiovascular: Normal rate, regular rhythm, normal heart sounds and intact distal pulses.   Pulmonary/Chest: Breath sounds normal. No stridor. No respiratory distress.   Dry cough present. No wheezing, rhonchi, or rales.    Abdominal: Soft. Bowel sounds are normal. There is no tenderness. There is no rebound and no guarding.   Musculoskeletal: Normal range of motion. She exhibits no edema or tenderness.   Neurological: She is alert and oriented to person, place, and time. She has normal strength. GCS score is 15. GCS eye subscore is 4. GCS verbal subscore is 5. GCS motor subscore is 6.   Skin: Skin is warm.  Capillary refill takes less than 2 seconds. No rash noted.   No urticaria          ED Course   Procedures  Labs Reviewed - No data to display       Imaging Results    None          Medical Decision Making:   ED Management:  Patient presented to ED with allergic reaction which started after eating food that was fried with peanut oil. She took Pepcid 20 mg and Benadryl 50 mg PTA. Patient has not had any evidence of respiratory/airway compromise, no facial swelling, no tachycardia, no hypertension. Patient was given IV Solumedrol 125 mg and Pepcid 20 mg. She responded well with resolution of symptoms. VS remain stable, non-toxic appearing. Patient will be discharged to home. Advised her to read all food labels and avoid consuming any nut products. Advised her to f/u with PCP to be referred to allergist for further testing. Patient currently has an epi-pen at home. Instructed her on proper use. Strict ED precautions were discussed with patient to return if her symptoms worsen in any way. Patient voices understanding and agreement with plan of care.                    ED Course as of Mathieu 15 1722   Tue Jan 14, 2020 2053 Patient reports that her symptoms have improved    [EM]      ED Course User Index  [EM] Teresa Barraza PA-C                Clinical Impression:       ICD-10-CM ICD-9-CM   1. Allergic reaction, initial encounter T78.40XA 995.3         Disposition:   Disposition: Discharged  Condition: Stable                     Teresa Barraza PA-C  01/15/20 1722       Teresa Barraza PA-C  01/15/20 1722

## 2020-01-15 NOTE — ED TRIAGE NOTES
"Reports to ED c c/o allergic reaction. States "I found out last week I was allergic to peanuts and today I cooked in peanut oil without realizing it." States ate at 1700 tonight  and symptoms started at 1900 tonight. Reports taking Pepcid and benadryl prior to arrival. States scratchy throat and hoarseness along c itching over generalized body. No tongue swelling or rash present. States has epi pen at home. Reports "did not use it because I could get here in time."    "

## 2020-01-15 NOTE — ED NOTES
Pt reports to feeling much better. Lying on stretcher c respirations even, unlabored c NADN. Placed on pulse oximetry and bp cuff for continuous monitoring. Bed locked and low. Call bell within reach.

## 2020-01-15 NOTE — DISCHARGE INSTRUCTIONS
Continue to take prednisone, Pepcid, and Benadryl as prescribed.  Follow up with her primary care physician for re-evaluation of symptoms. Return to ER if you develop any worsening of symptoms including difficulty breathing, facial swelling, tongue swelling, throat closing, chest pain, shortness of breath, wheezing, abdominal pain, or any new complaints.

## 2020-01-16 ENCOUNTER — OFFICE VISIT (OUTPATIENT)
Dept: ALLERGY | Facility: CLINIC | Age: 35
End: 2020-01-16
Payer: COMMERCIAL

## 2020-01-16 VITALS — WEIGHT: 293 LBS | HEIGHT: 62 IN | RESPIRATION RATE: 16 BRPM | BODY MASS INDEX: 53.92 KG/M2

## 2020-01-16 DIAGNOSIS — L29.9 ITCHING: ICD-10-CM

## 2020-01-16 DIAGNOSIS — T78.2XXD ANAPHYLAXIS, SUBSEQUENT ENCOUNTER: Primary | ICD-10-CM

## 2020-01-16 DIAGNOSIS — J31.0 CHRONIC RHINITIS: ICD-10-CM

## 2020-01-16 PROCEDURE — 99244 OFF/OP CNSLTJ NEW/EST MOD 40: CPT | Mod: S$GLB,,, | Performed by: STUDENT IN AN ORGANIZED HEALTH CARE EDUCATION/TRAINING PROGRAM

## 2020-01-16 PROCEDURE — 99244 PR OFFICE CONSULTATION,LEVEL IV: ICD-10-PCS | Mod: S$GLB,,, | Performed by: STUDENT IN AN ORGANIZED HEALTH CARE EDUCATION/TRAINING PROGRAM

## 2020-01-16 PROCEDURE — 99999 PR PBB SHADOW E&M-EST. PATIENT-LVL III: ICD-10-PCS | Mod: PBBFAC,,, | Performed by: STUDENT IN AN ORGANIZED HEALTH CARE EDUCATION/TRAINING PROGRAM

## 2020-01-16 PROCEDURE — 99999 PR PBB SHADOW E&M-EST. PATIENT-LVL III: CPT | Mod: PBBFAC,,, | Performed by: STUDENT IN AN ORGANIZED HEALTH CARE EDUCATION/TRAINING PROGRAM

## 2020-01-16 RX ORDER — LEVOCETIRIZINE DIHYDROCHLORIDE 5 MG/1
5 TABLET, FILM COATED ORAL 2 TIMES DAILY
Qty: 90 TABLET | Refills: 3 | Status: SHIPPED | OUTPATIENT
Start: 2020-01-16 | End: 2020-11-23 | Stop reason: SDUPTHER

## 2020-01-16 NOTE — PROGRESS NOTES
Allergy Clinic Note  [facility]    Subjective:      Patient ID: June Reyes is a 34 y.o. female.    Chief Complaint: Allergies and Allergic Reaction      Referring Provider: Melony Gonsalves MD    History of Present Illness:  34-year-old female referred from the emergency department following 2 episodes of throat closing during the last week.  She is here with her father, and she is a good historian.    The more severe of the 2 episodes occurred 01/10/2020 with a crown allergy as follows.  She felt well during the day.  She ate dinner at [employer] [address] consisting of a hamburger, wheat bread sweet potato and fries.  While at the restaurant she noticed some hand itching.  After leaving the restaurant she went to the tried her chest and to [facility].  She had no significant symptoms but in retrospect her  told her she was rubbing her arms.  The next went to a friend's house around 10:40 p.m. where there were 2 dogs.  Within 10 min she developed sudden throat itching a deep cough, shortness of breath, a sore throat, and runny nose.   The left the house due to these symptoms.  While driving symptoms worsened and they decided to go to the emergency room.  Upon arrival in the emergency room, she was complaining of cough, shortness of breath, red/watery eyes.  She states she felt in a fog and remembers very few details.  She says she also felt like her neck was big.  She says the nurse said it looked like her throat was closing.  She admits to hoarseness and reports a panicky sensation.  She responded rapidly to treatment in the emergency department and was discharged home.    She had a similar but milder episode 2 days ago.  This consisted of itchy throat, cough, difficulty taking a deep breath, and questionable sensation of throat closing.  She treated herself with Benadryl and Pepcid prior to presenting to the emergency room.      She denies the possibility of sting with either  episode.  She also denies taking aspirin, NSAIDs, or anything other than her usual medicines.  At the time her usual medicines consisted of Lexapro, Prilosec, Zofran.  (note that there are other medications listed on her med list but she says she has not taken them in quite a while.  In particular, she denies taking the Celebrex.)     Current medications from the emergency room are  EpiPen  Prednisone 1 tablet on taper  Pepcid 20 mg twice a day  Benadryl 50 mg as needed     Currently she complains of diffuse itching without rash.    Other allergic syndromes include:  1.  Cats.  Exposure to cats cause eyelid swelling  2.  Walnuts.  Several years ago contact with walnuts caused a rash and she had a positive skin test.  More recently she had an accidental walnut ingestion and immediately took a Benadryl.  She had no symptoms.  3.  Watermelon and cantaloupe.  Consuming these foods causes an itchy throat.    She reports that she is intolerant of either Zyrtec or Zyrtec D which causes palpitations.      Additional History:   Past medical history is significant for hypoglycemia and GERD.  She is status post tonsillectomy  Family history is significant for father and brother with rhinitis.  There is no family history of asthma.  Client  reports that she quit smoking about 2 years ago. Her smoking use included cigarettes. She has never used smokeless tobacco.  Exposures are notable for cats and dogs.  No exposure to mold.     Patient Active Problem List   Diagnosis    Nontraumatic tear of right rotator cuff    Morbid obesity    Biceps tendinitis of right upper extremity    Bilateral numbness and tingling of arms and legs    Left anterior cruciate ligament tear    Gastroesophageal reflux disease without esophagitis    Insomnia    Chronic idiopathic constipation    Abdominal pain, generalized    GERD (gastroesophageal reflux disease)     Current Outpatient Medications on File Prior to Visit   Medication Sig Dispense  Refill    diphenhydrAMINE (BENADRYL) 50 MG capsule Take 1 capsule (50 mg total) by mouth every 6 (six) hours as needed for Itching or Allergies. 20 capsule 0    EPINEPHrine (EPIPEN) 0.3 mg/0.3 mL AtIn Inject 0.6 mLs (0.6 mg total) into the muscle as needed. 2 each 1    escitalopram oxalate (LEXAPRO) 20 MG tablet Take 1 tablet (20 mg total) by mouth once daily. (Patient taking differently: Take 30 mg by mouth once daily. ) 30 tablet 11    famotidine (PEPCID) 20 MG tablet Take 1 tablet (20 mg total) by mouth 2 (two) times daily. for 7 days 14 tablet 0    lubiprostone (AMITIZA) 24 MCG Cap Take 1 capsule (24 mcg total) by mouth 2 (two) times daily with meals. (Patient taking differently: Take 24 mcg by mouth 2 (two) times daily as needed. ) 180 capsule 3    omeprazole (PRILOSEC) 40 MG capsule Take 40 mg by mouth once daily.      ondansetron (ZOFRAN) 4 MG tablet Take 1 tablet (4 mg total) by mouth every 8 (eight) hours as needed. 30 tablet 0    predniSONE (DELTASONE) 50 MG Tab Take 1 tablet (50 mg total) by mouth once daily. 10 tablet 0    triamterene-hydrochlorothiazide 37.5-25 mg (MAXZIDE-25) 37.5-25 mg per tablet Take 1 tablet by mouth daily as needed.      celecoxib (CELEBREX) 200 MG capsule Take 400 mg by mouth once daily.       No current facility-administered medications on file prior to visit.          Review of Systems   Constitutional: Negative for chills and fever.   HENT: Negative for ear discharge and nosebleeds.    Eyes: Negative for discharge and redness.   Respiratory: Positive for cough. Negative for hemoptysis, sputum production and stridor.    Gastrointestinal: Negative for blood in stool, melena and vomiting.   Genitourinary: Negative for dysuria and hematuria.        Urinary incontinence with cough   Skin: Positive for itching. Negative for rash.   Neurological: Negative for seizures and loss of consciousness.   Endo/Heme/Allergies: Negative for polydipsia. Does not bruise/bleed easily.  "      Objective:   Resp 16   Ht 5' 2" (1.575 m)   Wt 134.3 kg (296 lb 1.2 oz)   LMP 12/28/2019   BMI 54.15 kg/m²       Physical Exam   Constitutional: She is oriented to person, place, and time and well-developed, well-nourished, and in no distress.   HENT:   Head: Normocephalic and atraumatic.   Nose: Nose normal.   Mouth/Throat: No oropharyngeal exudate.   Nares pink with no significant turbinates swelling.  Oropharynx benign without exudate.   Eyes: Conjunctivae are normal. Right eye exhibits no discharge. Left eye exhibits no discharge.   Neck: Neck supple. No thyromegaly present.   Cardiovascular: Normal rate, regular rhythm, normal heart sounds and intact distal pulses.   Pulmonary/Chest: Effort normal and breath sounds normal. No stridor. No respiratory distress. She has no wheezes.   Abdominal: She exhibits no distension.   Liver approximately 15 cm by scratch   Musculoskeletal: She exhibits no edema or deformity.   Lymphadenopathy:     She has no cervical adenopathy.   Neurological: She is alert and oriented to person, place, and time.   Skin: No rash noted. No erythema.   Psychiatric: Memory and affect normal.       Data:   Demonstrates excellent use of Epi autoinjector      Assessment:     1. Anaphylaxis, subsequent encounter    2. Chronic rhinitis    3. Itching        Plan:     Medical decision making:  Patient is presenting with apparent anaphylaxis on 2 recent occasions.  The symptoms include rhino conjunctivitis, throat constriction with shortness of breath, and confusion.  Based on initial history and physical, the most likely etiology for the more severe reaction appears to be airborne exposure to dog dander.  No other clues are present, particularly for the milder episode.  Diagnostically will check for food in airborne allergies as well as a baseline tryptase.  Should symptoms recur, tryptase level drawn as soon as possible after symptom onset would be extremely valuable.    June was " seen today for allergies and allergic reaction.    Diagnoses and all orders for this visit:    Anaphylaxis, subsequent encounter  -     IgE; Future  -     Dermatophagoides Jeffers; Future  -     Dermatophagoides Pteronyssinus; Future  -     Bermuda; Future  -     Ramiro; Future  -     Riddle; Future  -     English Plantain; Future  -     Oak Pecan; Future  -     Pecan; Future  -     Marsh Elder; Future  -     Ragweed; Future  -     Alternaria; Future  -     Aspergillus; Future  -     Cat; Future  -     Cockroach; Future  -     Dog; Future  -     Peanut IgE; Future  -     Shrimp IgE; Future  -     Crab IgE; Future  -     Corn IgE; Future  -     Pecan, nut; Future  -     [place] IgE; Future  -     Allergen - Pistachio; Future  -     Hazelnut IgE; Future  -     Cashew IgE; Future  -     Brazil nut IgE; Future  -     Almonds IgE; Future  -     Rast Allergen-Latex; Future  -     Sesame seed IgE; Future  -     Tryptase; Future    Chronic rhinitis  -     IgE; Future  -     Dermatophagoides Jeffers; Future  -     Dermatophagoides Pteronyssinus; Future  -     [place]; Future  -     Ramiro; Future  -     Riddle; Future  -     English Plantain; Future  -     Oak Pecan; Future  -     Pecan; Future  -     Marsh Elder; Future  -     Ragweed; Future  -     Alternaria; Future  -     Aspergillus; Future  -     Cat; Future  -     Cockroach; Future  -     Dog; Future    Itching  -     levocetirizine (XYZAL) 5 MG tablet; Take 1 tablet (5 mg total) by mouth 2 (two) times daily.        Patient Instructions   Testing  Blood work for allergy testing today       Check [facility] in one week for results or call [phone]       Contact me with questions or concerns       I will contact you if anything needs immediate attention.        Treatment    Finish prednisone and Pepcid as directed.    Carry EpiPen at all times.    Xyzal (=levocetirizine, 5mg) 1-2 tablets daily     If needed can take up to 4 tablets in a 24 hour period.    Benadryl if  needed.        Return about 2 weeks.      Follow up in about 2 weeks (around 1/30/2020).    Pennie Philip MD

## 2020-01-16 NOTE — PATIENT INSTRUCTIONS
Testing  Blood work for allergy testing today       Check [facility] in one week for results or call [phone]       Contact me with questions or concerns       I will contact you if anything needs immediate attention.        Treatment    Finish prednisone and Pepcid as directed.    Carry EpiPen at all times.    Xyzal (=levocetirizine, 5mg) 1-2 tablets daily     If needed can take up to 4 tablets in a 24 hour period.    Benadryl if needed.        Return about 2 weeks.

## 2020-01-22 ENCOUNTER — PATIENT MESSAGE (OUTPATIENT)
Dept: ALLERGY | Facility: CLINIC | Age: 35
End: 2020-01-22

## 2020-01-24 ENCOUNTER — DOCUMENTATION ONLY (OUTPATIENT)
Dept: BARIATRICS | Facility: CLINIC | Age: 35
End: 2020-01-24

## 2020-01-24 ENCOUNTER — PATIENT MESSAGE (OUTPATIENT)
Dept: BARIATRICS | Facility: CLINIC | Age: 35
End: 2020-01-24

## 2020-01-24 NOTE — PROGRESS NOTES
Bariatric Surgery Online Course Form Submission  Someone has submitted a Bariatric Surgery Online Course Form Submission on this page.  Date: 01- 09:39:25  Patient's Name: June Reyes  YOB: 1985 Email: [email or web address]  Phone: [phone]   Patient Address: [address] [place] [address]  Preferred Surgical Location: [facility]   I certify that I am 18 years of age or older: Yes   Confirmation Code: [id number]  Verification of Bariatric Seminar: yes  Insurance Information  Insurance or Self Pay? Insurance - Fill out fields below  Insurance Company Name: [employer]   Type of Coverage/Coverage Plan (i.e. PPO, HMO):   Group Name:   Subscriber Name:   Member Name (patient's name):   Member ID/Policy #:   Plan Effective Date:   Card Issuance date:

## 2020-01-26 ENCOUNTER — PATIENT MESSAGE (OUTPATIENT)
Dept: ALLERGY | Facility: CLINIC | Age: 35
End: 2020-01-26

## 2020-01-26 PROBLEM — T78.2XXA ANAPHYLACTIC SYNDROME: Status: ACTIVE | Noted: 2020-01-26

## 2020-01-28 PROBLEM — D72.823 LEUKEMOID REACTION: Status: ACTIVE | Noted: 2020-01-28

## 2020-01-29 ENCOUNTER — TELEPHONE (OUTPATIENT)
Dept: ALLERGY | Facility: CLINIC | Age: 35
End: 2020-01-29

## 2020-01-29 NOTE — TELEPHONE ENCOUNTER
----- Message from Racquel Guzman sent at 1/29/2020 11:40 AM CST -----  Contact: patient  Please call above patient at [phone] said she need to speak with the nurse have an appointment on tomorrow but still in hospital waiting on a call back thanks.

## 2020-01-29 NOTE — TELEPHONE ENCOUNTER
Spoke to patient. Patient stated that she is still hospitalized and will not be released in time for appointment tomorrow. Patient ate breakfast this morning and had another reaction. While on phone patient was very hoarse.  I am cancelling tomorrow's appointment. Told patient to contact us once she is d/c and I would be happy to reschedule.

## 2020-01-30 ENCOUNTER — TELEPHONE (OUTPATIENT)
Dept: BARIATRICS | Facility: CLINIC | Age: 35
End: 2020-01-30

## 2020-01-30 ENCOUNTER — OFFICE VISIT (OUTPATIENT)
Dept: ALLERGY | Facility: CLINIC | Age: 35
End: 2020-01-30
Payer: COMMERCIAL

## 2020-01-30 VITALS — HEIGHT: 62 IN | WEIGHT: 291.25 LBS | BODY MASS INDEX: 53.6 KG/M2

## 2020-01-30 DIAGNOSIS — R68.89 THROAT SYMPTOM: ICD-10-CM

## 2020-01-30 DIAGNOSIS — J30.89 ALLERGIC RHINITIS DUE TO HOUSE DUST MITE: Primary | ICD-10-CM

## 2020-01-30 DIAGNOSIS — R49.0 HOARSENESS: ICD-10-CM

## 2020-01-30 DIAGNOSIS — R06.02 SHORTNESS OF BREATH: ICD-10-CM

## 2020-01-30 DIAGNOSIS — J30.1 NON-SEASONAL ALLERGIC RHINITIS DUE TO POLLEN: ICD-10-CM

## 2020-01-30 PROCEDURE — 99214 OFFICE O/P EST MOD 30 MIN: CPT | Mod: S$GLB,,, | Performed by: STUDENT IN AN ORGANIZED HEALTH CARE EDUCATION/TRAINING PROGRAM

## 2020-01-30 PROCEDURE — 3008F BODY MASS INDEX DOCD: CPT | Mod: CPTII,S$GLB,, | Performed by: STUDENT IN AN ORGANIZED HEALTH CARE EDUCATION/TRAINING PROGRAM

## 2020-01-30 PROCEDURE — 99999 PR PBB SHADOW E&M-EST. PATIENT-LVL III: CPT | Mod: PBBFAC,,, | Performed by: STUDENT IN AN ORGANIZED HEALTH CARE EDUCATION/TRAINING PROGRAM

## 2020-01-30 PROCEDURE — 99999 PR PBB SHADOW E&M-EST. PATIENT-LVL III: ICD-10-PCS | Mod: PBBFAC,,, | Performed by: STUDENT IN AN ORGANIZED HEALTH CARE EDUCATION/TRAINING PROGRAM

## 2020-01-30 PROCEDURE — 3008F PR BODY MASS INDEX (BMI) DOCUMENTED: ICD-10-PCS | Mod: CPTII,S$GLB,, | Performed by: STUDENT IN AN ORGANIZED HEALTH CARE EDUCATION/TRAINING PROGRAM

## 2020-01-30 PROCEDURE — 99214 PR OFFICE/OUTPT VISIT, EST, LEVL IV, 30-39 MIN: ICD-10-PCS | Mod: S$GLB,,, | Performed by: STUDENT IN AN ORGANIZED HEALTH CARE EDUCATION/TRAINING PROGRAM

## 2020-01-30 NOTE — TELEPHONE ENCOUNTER
----- Message from Franchesca Haas sent at 1/24/2020  9:39 AM CST -----  Regarding: needs appointment  Can you schedule initial comprehensive consult for this patient?     Thank you,    Franchesca

## 2020-01-30 NOTE — TELEPHONE ENCOUNTER
Phoned patient to set up consult appointment. Patient states she is just getting out of the hospital and would like a call back in a week.

## 2020-01-30 NOTE — PATIENT INSTRUCTIONS
Xyzal  Twice a day with extra if needed      Sensation of throat closing and/or shortness of breath     Check oxygen saturation.     If 95% or better, you are doing fine.     If 92% or lower, use Epi     (If in between continue to monitor)      ? Peanut and sesame seed allergy  Avoid both for now.    Grass allergy  Avoid mowing grass and stay away when others are mowing  Avoid direct contact with grass    Dust mite allergy  Dust proof covers on all pillows that stay on the bed at night.  Dust proof cover on mattress.   No stuffed animals on the bed at night.  Take a pillow cover (or your pillow) for vacations

## 2020-01-30 NOTE — PROGRESS NOTES
Allergy Clinic Note  [facility]    Subjective:      Patient ID: June Reyes is a 34 y.o. female.    Chief Complaint: Allergies (Hospital follow up)      Allergy problem list:   Two episodes of sensation of throat closing        With cough, SOB, throat itching, ST, and runny nose         ADM X1         Normal ENT endoscopy during hoarseness and throat constriction feeling    History of Present Illness:  34-year-old female seen initially following 2 episodes of apparent anaphylaxis is now here following hospital discharge for a protracted episode of similar Sx.  She is here with her father, and the history is complicated    During recent hospitalization patient was complaining of, among other things, the sensation of throat tightening and hoarseness.  ENT endoscopy during symptoms failed to show any swelling, narrowing or other abnormality except for enlarged tonsils. A tryptase level done mid hospitalization is same as her baseline.  According to the hospitalist, her symptoms waxed and waned during her admission.  The tryptase level was drawn shortly after the onset of 1 of these periods of increased symptoms.  Following the ENT evaluation, the hospitalist group and I decided to deescalate care.    As of today's visit, her current symptoms are hoarseness, flushing, hot flashes, and blotchy rashes limited to her arms.    Today she red to me from her symptom diary including all foods an association with symptom flares.  There is no clear pattern.    At her initial visit she reported 2 episodes as follows:    The more severe of the 2 episodes occurred 01/10/2020 with a chronology as follows.  She felt well during the day.  She ate dinner at [employer] at aproximately 6:45 consisting of a hamburger, wheat bread, sweet potato fries.  While at the restaurant she noticed some hand itching.  After leaving the restaurant she went to the [facility] and to [facility].  She had no significant symptoms but  in retrospect her  told her she was rubbing her arms.  They next went to a friend's house around 10:40 p.m. where there were 2 dogs.  Within 10 min she developed sudden throat itching, a deep cough, shortness of breath, a sore throat, and runny nose.   The left the house due to these symptoms.  While driving symptoms worsened and they decided to go to the emergency room.  Upon arrival in the emergency room, she was complaining of cough, shortness of breath, red/watery eyes.  She states she felt in a fog and remembers very few details.  She says she also felt like her neck was big.  She says the nurse said it looked like her throat was closing.  She admits to hoarseness and reports a panicky sensation.  She responded rapidly to treatment in the emergency department and was discharged home.    She had a similar but milder episode 2 days before her initial visit.  This consisted of itchy throat, cough, difficulty taking a deep breath, and questionable sensation of throat closing.  She treated herself with Benadryl and Pepcid prior to presenting to the emergency room.      She denies the possibility of sting with either episode.  She also denies taking aspirin, NSAIDs, or anything other than her usual medicines.  At the time her usual medicines consisted of Lexapro, Prilosec, Zofran.  (note that there are other medications listed on her med list but she says she has not taken them in quite a while.  In particular, she denies taking the Celebrex.)     Current medications from the emergency room are  EpiPen  Prednisone 1 tablet on taper  Pepcid 20 mg twice a day  Benadryl 50 mg as needed     Currently she complains of diffuse itching without rash.    Other allergic syndromes include:  1.  Cats.  Exposure to cats cause eyelid swelling  2.  Walnuts.  Several years ago contact with walnuts caused a rash and she had a positive skin test.  More recently she had an accidental walnut ingestion and immediately took a  Benadryl.  She had no symptoms.  3.  Watermelon and cantaloupe.  Consuming these foods causes an itchy throat.    She reports that she is intolerant of either Zyrtec or Zyrtec D which causes palpitations.      Additional History:   Past medical history is significant for hypoglycemia and GERD.  She is status post tonsillectomy.  Family history is significant for father and brother with rhinitis.  There is no family history of asthma.  Client  reports that she quit smoking about 2 years ago. Her smoking use included cigarettes. She has never used smokeless tobacco.   No exposure to mold.     Patient Active Problem List   Diagnosis    Nontraumatic tear of right rotator cuff    Morbid obesity with BMI of 50.0-59.9, adult    Biceps tendinitis of right upper extremity    Bilateral numbness and tingling of arms and legs    Left anterior cruciate ligament tear    Gastroesophageal reflux disease without esophagitis    Insomnia    Chronic idiopathic constipation    Abdominal pain, generalized    GERD (gastroesophageal reflux disease)    Anaphylactic syndrome    Leukemoid reaction    Dysphonia    Dyspnea     Current Outpatient Medications on File Prior to Visit   Medication Sig Dispense Refill    diphenhydrAMINE (BENADRYL) 50 MG capsule Take 1 capsule (50 mg total) by mouth every 6 (six) hours as needed for Itching or Allergies. 20 capsule 0    EPINEPHrine (EPIPEN) 0.3 mg/0.3 mL AtIn Inject 0.6 mLs (0.6 mg total) into the muscle as needed (for severe allergic reaction). 2 Device 0    escitalopram oxalate (LEXAPRO) 20 MG tablet Take 1 tablet (20 mg total) by mouth once daily. (Patient taking differently: Take 30 mg by mouth once daily. ) 30 tablet 11    Lactobacillus rhamnosus GG (CULTURELLE) 10 billion cell capsule Take 1 capsule by mouth daily as needed (stomach upset).      levocetirizine (XYZAL) 5 MG tablet Take 1 tablet (5 mg total) by mouth 2 (two) times daily. 90 tablet 3    lubiprostone (AMITIZA) 24  MCG Cap Take 1 capsule (24 mcg total) by mouth 2 (two) times daily with meals. (Patient taking differently: Take 24 mcg by mouth 2 (two) times daily as needed (stomach). ) 180 capsule 3    methylPREDNISolone (MEDROL DOSEPACK) 4 mg tablet use as directed - go to the second dose. 1 Package 0    ondansetron (ZOFRAN-ODT) 8 MG TbDL Take 8 mg by mouth every 6 (six) hours as needed (nausea).      famotidine (PEPCID) 20 MG tablet Take 1 tablet (20 mg total) by mouth 2 (two) times daily. for 7 days 14 tablet 0    [DISCONTINUED] celecoxib (CELEBREX) 200 MG capsule Take 400 mg by mouth daily as needed for Pain.       [DISCONTINUED] cetirizine (ZYRTEC) 10 MG tablet Take 1 tablet (10 mg total) by mouth every evening. 30 tablet 0    [DISCONTINUED] EPINEPHrine (EPIPEN) 0.3 mg/0.3 mL AtIn Inject 0.6 mLs (0.6 mg total) into the muscle as needed. (Patient taking differently: Inject 2 each into the muscle as needed (for severe allergic reaction). ) 2 each 1    [DISCONTINUED] omeprazole (PRILOSEC) 40 MG capsule Take 40 mg by mouth once daily.      [DISCONTINUED] predniSONE (DELTASONE) 50 MG Tab Take 1 tablet (50 mg total) by mouth once daily. (Patient not taking: Reported on 1/26/2020) 10 tablet 0    [DISCONTINUED] triamterene-hydrochlorothiazide 37.5-25 mg (MAXZIDE-25) 37.5-25 mg per tablet Take 1 tablet by mouth daily as needed (swelling).        Current Facility-Administered Medications on File Prior to Visit   Medication Dose Route Frequency Provider Last Rate Last Dose    [COMPLETED] methylPREDNISolone tablet 8 mg  8 mg Oral Once Bridgett Katz MD   8 mg at 01/29/20 1721    Followed by    [COMPLETED] methylPREDNISolone tablet 4 mg  4 mg Oral Once Bridgett Katz MD   4 mg at 01/29/20 1722    Followed by    [COMPLETED] methylPREDNISolone tablet 4 mg  4 mg Oral Once Bridgett Katz MD   4 mg at 01/29/20 1723    Followed by    [COMPLETED] methylPREDNISolone tablet 8 mg  8 mg Oral Once Bridgett Katz MD   8 mg at 01/29/20 2051     Followed by    [COMPLETED] methylPREDNISolone tablet 4 mg  4 mg Oral Once Bridgett Katz MD   4 mg at 01/30/20 0834    [DISCONTINUED] 0.9%  NaCl infusion   Intravenous Continuous Becky Lazo MD   Stopped at 01/29/20 1710    [DISCONTINUED] cetirizine tablet 10 mg  10 mg Oral QHS Becky Lazo MD   10 mg at 01/29/20 2050    [DISCONTINUED] dextrose 50% injection 12.5 g  12.5 g Intravenous PRN Leonard Newman MD        [DISCONTINUED] dextrose 50% injection 25 g  25 g Intravenous PRN Leonard Newman MD        [DISCONTINUED] diphenhydrAMINE capsule 50 mg  50 mg Oral Q6H PRN Bridgett Katz MD   50 mg at 01/29/20 2051    [DISCONTINUED] diphenhydrAMINE injection 25 mg  25 mg Intravenous Q6H PRN Bridgett Katz MD        [DISCONTINUED] diphenhydrAMINE injection 50 mg  50 mg Intravenous Q4H Becky Lazo MD   50 mg at 01/29/20 1449    [DISCONTINUED] enoxaparin injection 40 mg  40 mg Subcutaneous Daily Becky Lazo MD   40 mg at 01/29/20 1707    [DISCONTINUED] EPINEPHrine injection 0.3 mg  0.3 mg Intravenous PRN Leonard Newman MD        [DISCONTINUED] escitalopram oxalate tablet 20 mg  20 mg Oral Daily Leonard Newman MD   20 mg at 01/30/20 0834    [DISCONTINUED] famotidine tablet 20 mg  20 mg Oral BID Leonard Newman MD   20 mg at 01/30/20 0834    [DISCONTINUED] glucagon (human recombinant) injection 1 mg  1 mg Intramuscular PRN Leonard Newman MD        [DISCONTINUED] glucose chewable tablet 16 g  16 g Oral PRN Leonard Newman MD        [DISCONTINUED] glucose chewable tablet 24 g  24 g Oral PRN Leonard Newman MD        [DISCONTINUED] methylPREDNISolone sodium succinate injection 40 mg  40 mg Intravenous BID Bridgett Katz MD        [DISCONTINUED] methylPREDNISolone sodium succinate injection 80 mg  80 mg Intravenous Q8H Becky Lazo MD   80 mg at 01/29/20 1445    [DISCONTINUED] methylPREDNISolone tablet 4 mg  4 mg Oral Once Bridgett Katz MD        [DISCONTINUED] methylPREDNISolone tablet 4 mg  4 mg  Oral Once Bridgett Katz MD        [DISCONTINUED] methylPREDNISolone tablet 4 mg  4 mg Oral Once Bridgett Katz MD        [DISCONTINUED] methylPREDNISolone tablet 4 mg  4 mg Oral Once Bridgett Katz MD        [DISCONTINUED] methylPREDNISolone tablet 4 mg  4 mg Oral Once Brigdett Katz MD        [DISCONTINUED] methylPREDNISolone tablet 4 mg  4 mg Oral Once Bridgett Katz MD        [DISCONTINUED] methylPREDNISolone tablet 4 mg  4 mg Oral Once Bridgett Katz MD        [DISCONTINUED] methylPREDNISolone tablet 4 mg  4 mg Oral Once Bridgett Katz MD        [DISCONTINUED] methylPREDNISolone tablet 4 mg  4 mg Oral Once Bridgett Katz MD        [DISCONTINUED] methylPREDNISolone tablet 4 mg  4 mg Oral Once Bridgett Katz MD        [DISCONTINUED] methylPREDNISolone tablet 4 mg  4 mg Oral Once Bridgett Katz MD        [DISCONTINUED] methylPREDNISolone tablet 4 mg  4 mg Oral Once Bridgett Katz MD        [DISCONTINUED] methylPREDNISolone tablet 8 mg  8 mg Oral Once Bridgett Katz MD        [DISCONTINUED] montelukast chewable tablet 10 mg  10 mg Oral Daily Becky Lazo MD   10 mg at 01/30/20 0834    [DISCONTINUED] ondansetron disintegrating tablet 4 mg  4 mg Oral Q6H PRN Becky Lazo MD   4 mg at 01/28/20 1547    [DISCONTINUED] sodium chloride 0.9% flush 10 mL  10 mL Intravenous PRN Leonard Newman MD   10 mL at 01/28/20 2135         Review of Systems   Constitutional: Negative for chills and fever.   HENT: Negative for ear discharge and nosebleeds.         Nasal tingling   Eyes: Negative for discharge and redness.   Respiratory: Negative for hemoptysis, sputum production, wheezing and stridor.    Cardiovascular: Negative for chest pain and palpitations.   Gastrointestinal: Negative for blood in stool, melena and vomiting.   Genitourinary: Negative for dysuria and hematuria.   Skin: Positive for itching and rash.   Neurological: Negative for seizures and loss of consciousness.   Endo/Heme/Allergies: Positive for  "environmental allergies. Does not bruise/bleed easily.       Objective:   Ht 5' 2" (1.575 m)   Wt 132.1 kg (291 lb 3.6 oz)   BMI 53.27 kg/m²       Physical Exam   Constitutional: She is oriented to person, place, and time and well-developed, well-nourished, and in no distress.   HENT:   Head: Normocephalic and atraumatic.   Nose: Nose normal.   Eyes: Conjunctivae are normal. Right eye exhibits no discharge. Left eye exhibits no discharge.   Neck: Neck supple.   Cardiovascular: Normal rate, regular rhythm and intact distal pulses.   Pulmonary/Chest: Effort normal. No stridor. No respiratory distress.   Abdominal: She exhibits no distension.   Musculoskeletal: She exhibits no edema or deformity.   Neurological: She is alert and oriented to person, place, and time.   Skin: No rash noted. No erythema.   Psychiatric: Affect and judgment normal.       Data:   Allergy testing by the Immunocap method (01/16/2020 was most notable for positive reactions to dust mites and grass.  She also had lower level reactions to other aeroallergens and borderline reactions to a number of foods.  Aeroallergens:   Class IV  dust mite (Df), Bermuda grass, Ramiro grass   Class III   dust mite (Dp), ragweed, dog   Class II    English plantain, oak, pecan, marsh elder, Alternaria, cat  Foods:   Class II     sesame seed   Class I      corn, walnut, pistachio, hazelnut, almond,   Latex:   Class II  All other allergens less than 0.35 KU/L  Total serum IgE 264.    Baseline tryptase 4.6 (01/16/2020)  Tryptase mid hospitalization 4.8 (01/28/2020)    Demonstrated excellent use of Epi autoinjector  at initial visit        Assessment:     1. Allergic rhinitis due to house dust mite    2. Non-seasonal allergic rhinitis due to pollen    3. Hoarseness    4. Shortness of breath    5. Throat symptom        Plan:     Medical decision making:  Patient is presenting with recurrent episodes of a conglomerate  of symptoms.  It appears that some of these " symptoms are subjective and some are objective.  It remains to be seen how much is IgE mediated.  The value of the tryptase during hospitalization is questionable.  It is possible that after a few days of symptoms she may have depleted her mast cell stores.  A tryptase level at the very beginning of symptoms would be much more valuable.  Based on her ENT endoscopy, it seems that the throat symptoms most likely represent globus paladus.  Today we discussed the difference between sensation of throat closing an actual narrowing.  Patient appears open to the idea that some of her symptoms may be related to anxiety.  As a way to differentiate subjective from objective throat constriction with shortness of breath, I recommend in the following oximetry at home.  She is highly amenable to this plan.  Please see patient instructions for parameters.           Her skin symptoms are clearly bothersome and reducing her quality of life.  Recommend resuming Xyzal b.i.d. and p.r.n. and continuing the Pepcid started at the hospital.  She also has prescription for a new EpiPen.          Discussed patient's food allergy Immunocap results.  She has a fixed belief that she is allergic to peanuts.   Will not challenge at this time.  I recommend that she avoid peanut and sesame seeds on empiric basis.  May address further at some point in the future.           Also discussed her dust mite allergy and grass allergy, and made suggestions on avoidance measures.      June was seen today for allergies.    Diagnoses and all orders for this visit:    Allergic rhinitis due to house dust mite    Non-seasonal allergic rhinitis due to pollen    Hoarseness    Shortness of breath    Throat symptom        Patient Instructions   Xyzal  Twice a day with extra if needed      Sensation of throat closing and/or shortness of breath     Check oxygen saturation.     If 95% or better, you are doing fine.     If 92% or lower, use Epi     (If in between  continue to monitor)      ? Peanut and sesame seed allergy  Avoid both for now.    Grass allergy  Avoid mowing grass and stay away when others are mowing  Avoid direct contact with grass    Dust mite allergy  Dust proof covers on all pillows that stay on the bed at night.  Dust proof cover on mattress.   No stuffed animals on the bed at night.  Take a pillow cover (or your pillow) for vacations            Follow up in about 4 weeks (around 2/27/2020).    Pennie Philip MD

## 2020-02-04 ENCOUNTER — TELEPHONE (OUTPATIENT)
Dept: SURGERY | Facility: CLINIC | Age: 35
End: 2020-02-04

## 2020-02-04 ENCOUNTER — PATIENT MESSAGE (OUTPATIENT)
Dept: BARIATRICS | Facility: CLINIC | Age: 35
End: 2020-02-04

## 2020-02-04 NOTE — TELEPHONE ENCOUNTER
----- Message from Danyell Barrera sent at 2/4/2020  9:50 AM CST -----  Contact: [phone]  Pt states she is retuning nurse phone call from a week ago please call back to discuss

## 2020-02-06 ENCOUNTER — INITIAL CONSULT (OUTPATIENT)
Dept: BARIATRICS | Facility: CLINIC | Age: 35
End: 2020-02-06
Payer: COMMERCIAL

## 2020-02-06 ENCOUNTER — CLINICAL SUPPORT (OUTPATIENT)
Dept: BARIATRICS | Facility: CLINIC | Age: 35
End: 2020-02-06
Payer: COMMERCIAL

## 2020-02-06 VITALS — WEIGHT: 293 LBS | BODY MASS INDEX: 51.9 KG/M2

## 2020-02-06 VITALS
HEART RATE: 104 BPM | HEIGHT: 63 IN | BODY MASS INDEX: 51.91 KG/M2 | DIASTOLIC BLOOD PRESSURE: 72 MMHG | SYSTOLIC BLOOD PRESSURE: 118 MMHG | WEIGHT: 293 LBS

## 2020-02-06 DIAGNOSIS — K21.9 GASTROESOPHAGEAL REFLUX DISEASE, ESOPHAGITIS PRESENCE NOT SPECIFIED: ICD-10-CM

## 2020-02-06 DIAGNOSIS — K21.9 GASTROESOPHAGEAL REFLUX DISEASE WITHOUT ESOPHAGITIS: ICD-10-CM

## 2020-02-06 DIAGNOSIS — E66.01 MORBID OBESITY WITH BMI OF 50.0-59.9, ADULT: Primary | ICD-10-CM

## 2020-02-06 PROBLEM — E05.90 HYPERTHYROIDISM: Status: ACTIVE | Noted: 2018-05-29

## 2020-02-06 PROCEDURE — 99999 PR PBB SHADOW E&M-EST. PATIENT-LVL IV: CPT | Mod: PBBFAC,,, | Performed by: PHYSICIAN ASSISTANT

## 2020-02-06 PROCEDURE — 99205 OFFICE O/P NEW HI 60 MIN: CPT | Mod: S$GLB,,, | Performed by: PHYSICIAN ASSISTANT

## 2020-02-06 PROCEDURE — 99499 UNLISTED E&M SERVICE: CPT | Mod: S$GLB,,, | Performed by: DIETITIAN, REGISTERED

## 2020-02-06 PROCEDURE — 3008F BODY MASS INDEX DOCD: CPT | Mod: CPTII,S$GLB,, | Performed by: PHYSICIAN ASSISTANT

## 2020-02-06 PROCEDURE — 99499 NO LOS: ICD-10-PCS | Mod: S$GLB,,, | Performed by: DIETITIAN, REGISTERED

## 2020-02-06 PROCEDURE — 99999 PR PBB SHADOW E&M-EST. PATIENT-LVL II: ICD-10-PCS | Mod: PBBFAC,,, | Performed by: DIETITIAN, REGISTERED

## 2020-02-06 PROCEDURE — 99999 PR PBB SHADOW E&M-EST. PATIENT-LVL IV: ICD-10-PCS | Mod: PBBFAC,,, | Performed by: PHYSICIAN ASSISTANT

## 2020-02-06 PROCEDURE — 99205 PR OFFICE/OUTPT VISIT, NEW, LEVL V, 60-74 MIN: ICD-10-PCS | Mod: S$GLB,,, | Performed by: PHYSICIAN ASSISTANT

## 2020-02-06 PROCEDURE — 3008F PR BODY MASS INDEX (BMI) DOCUMENTED: ICD-10-PCS | Mod: CPTII,S$GLB,, | Performed by: PHYSICIAN ASSISTANT

## 2020-02-06 PROCEDURE — 99999 PR PBB SHADOW E&M-EST. PATIENT-LVL II: CPT | Mod: PBBFAC,,, | Performed by: DIETITIAN, REGISTERED

## 2020-02-06 RX ORDER — OMEPRAZOLE 40 MG/1
40 CAPSULE, DELAYED RELEASE ORAL DAILY
COMMUNITY
End: 2022-07-11

## 2020-02-06 NOTE — PROGRESS NOTES
BARIATRIC NEW PATIENT EVALUATION    CHIEF COMPLAINT:   Morbid Obesity Body mass index is 51.9 kg/m². and inability to lose weight and maintain weight loss .    HISTORY OF PRESENT ILLNESS:  June Reyes  is a 34 y.o. female presenting for morbid obesity Body mass index is 51.9 kg/m². and inability to lose weight and maintain weight loss . Pt states that she has always been overweight. States that her lowest adult weight was around 150 - 160 lbs at the age of 24.  Pt increased weight gain after her son, states then worse after she miscarried her twins. Got  and gained 50 lbs. States that she also had multiple surgeries which decreased activity. The patient has tried no carb (20 lb weight loss) Plexis ( no weight loss), portion control, OTC weight loss medication ( gained weight). 300 lbs highest weight. Pt would like to weigh 150 lbs.    BMI: 150 lbs     NM Gastric Emptying 01/02/2020  Impression       Normal gastric emptying time.     FL Esophagram :01/02/2020  Impression       No acute findings.    Total fluoroscopy time is 1 minutes 59 seconds.           EGD :01/02/2020    Impression:    - A single plaque in the upper third of the                            esophagus. Biopsied. Endoscopically this is                            consistent with an inlet patch.                        - Two mucosal nodules found in the esophagus.                            Removed with biopsy forceps.                        - Tortuous and baggy esophagus.                        - Hiatal hernia.                        - Erythematous mucosa in the antrum. Biopsied for                         H. pylori.                        - Normal examined duodenum. Biopsied for celiac                         disease.    PAST MEDICAL HISTORY:  Past Medical History:   Diagnosis Date    Allergy     Anxiety     BMI 50.0-59.9, adult     GERD (gastroesophageal reflux disease)     Hypoglycemic disorder     Obesity     Thyroid disease      Vertigo          PAST SURGICAL HISTORY:  Past Surgical History:   Procedure Laterality Date    ADENOIDECTOMY      ARTHROSCOPIC CHONDROPLASTY OF KNEE JOINT Left 2019    Procedure: ARTHROSCOPY, KNEE, WITH CHONDROPLASTY;  Surgeon: Sravan Antonio MD;  Location: [facility];  Service: Orthopedics;  Laterality: Left;    ARTHROSCOPIC DEBRIDEMENT OF SHOULDER Right 3/1/2019    Procedure: DEBRIDEMENT, SHOULDER, ARTHROSCOPIC;  Surgeon: Sravan Antonio MD;  Location: [facility];  Service: Orthopedics;  Laterality: Right;  labral debridement    ARTHROSCOPIC REPAIR OF ROTATOR CUFF OF SHOULDER Right 3/1/2019    Procedure: REPAIR, ROTATOR CUFF, ARTHROSCOPIC;  Surgeon: Sravan Antonio MD;  Location: [facility];  Service: Orthopedics;  Laterality: Right;  subscapularis  Regeneten implant    ARTHROSCOPY OF KNEE Left 2019    Procedure: ARTHROSCOPY, KNEE;  Surgeon: Sravan Antonio MD;  Location: [facility];  Service: Orthopedics;  Laterality: Left;  regional w/o catheter / adductor     ARTHROSCOPY OF SHOULDER WITH DECOMPRESSION OF SUBACROMIAL SPACE Right 3/1/2019    Procedure: ARTHROSCOPY, SHOULDER, WITH SUBACROMIAL SPACE DECOMPRESSION;  Surgeon: Sravan Antonio MD;  Location: [facility];  Service: Orthopedics;  Laterality: Right;     SECTION      DILATION AND CURETTAGE OF UTERUS      ESOPHAGOGASTRODUODENOSCOPY N/A 2020    Procedure: EGD (ESOPHAGOGASTRODUODENOSCOPY);  Surgeon: Erinn Brenner MD;  Location: [facility];  Service: Endoscopy;  Laterality: N/A;    GANGLION CYST EXCISION      RECONSTRUCTION OF LIGAMENT Left 2019    Procedure: RECONSTRUCTION, LIGAMENT;  Surgeon: Sravan Antonio MD;  Location: [facility];  Service: Orthopedics;  Laterality: Left;  ACL    REPAIR OF MENISCUS OF KNEE Left 2019    Procedure: REPAIR, MENISCUS, KNEE;  Surgeon: Sravan Antonio MD;  Location: [facility];  Service: Orthopedics;  Laterality: Left;  lateral    TONSILLECTOMY         FAMILY  HISTORY:  Family History   Problem Relation Age of Onset    Diabetes Mother     Hypertension Mother     Depression Mother     Hypertension Father     Heart disease Father     Allergies Father     Cancer Maternal Aunt     Diabetes Paternal Grandfather     Allergic rhinitis Neg Hx     Angioedema Neg Hx     Asthma Neg Hx     Atopy Neg Hx     Eczema Neg Hx     Immunodeficiency Neg Hx     Rhinitis Neg Hx     Urticaria Neg Hx        SOCIAL HISTORY:  Social History     Socioeconomic History    Marital status:      Spouse name: Not on file    Number of children: Not on file    Years of education: Not on file    Highest education level: Not on file   Occupational History    Not on file   Social Needs    Financial resource strain: Not on file    Food insecurity:     Worry: Not on file     Inability: Not on file    Transportation needs:     Medical: Not on file     Non-medical: Not on file   Tobacco Use    Smoking status: Former Smoker     Types: Cigarettes     Last attempt to quit: 2017     Years since quittin.9    Smokeless tobacco: Never Used   Substance and Sexual Activity    Alcohol use: Yes     Comment: occ    Drug use: No    Sexual activity: Yes     Partners: Male     Birth control/protection: None   Lifestyle    Physical activity:     Days per week: Not on file     Minutes per session: Not on file    Stress: Not on file   Relationships    Social connections:     Talks on phone: Not on file     Gets together: Not on file     Attends Yarsanism service: Not on file     Active member of club or organization: Not on file     Attends meetings of clubs or organizations: Not on file     Relationship status: Not on file   Other Topics Concern    Not on file   Social History Narrative    Not on file       MEDICATIONS:  Current Outpatient Medications   Medication Sig Dispense Refill    diphenhydrAMINE (BENADRYL) 50 MG capsule Take 1 capsule (50 mg total) by mouth every 6 (six)  hours as needed for Itching or Allergies. 20 capsule 0    EPINEPHrine (EPIPEN) 0.3 mg/0.3 mL AtIn Inject 0.6 mLs (0.6 mg total) into the muscle as needed (for severe allergic reaction). 2 Device 0    escitalopram oxalate (LEXAPRO) 20 MG tablet Take 1 tablet (20 mg total) by mouth once daily. (Patient taking differently: Take 30 mg by mouth once daily. ) 30 tablet 11    Lactobacillus rhamnosus GG (CULTURELLE) 10 billion cell capsule Take 1 capsule by mouth daily as needed (stomach upset).      levocetirizine (XYZAL) 5 MG tablet Take 1 tablet (5 mg total) by mouth 2 (two) times daily. 90 tablet 3    lubiprostone (AMITIZA) 24 MCG Cap Take 1 capsule (24 mcg total) by mouth 2 (two) times daily with meals. (Patient taking differently: Take 24 mcg by mouth 2 (two) times daily as needed (stomach). ) 180 capsule 3    omeprazole (PRILOSEC) 40 MG capsule Take 40 mg by mouth once daily.      ondansetron (ZOFRAN-ODT) 8 MG TbDL Take 8 mg by mouth every 6 (six) hours as needed (nausea).       No current facility-administered medications for this visit.        ALLERGIES:  Review of patient's allergies indicates:   Allergen Reactions    Nuts [tree nut] Anaphylaxis     [place] allergy, Peanut allergy    Sesame seed Anaphylaxis       ROS:  Review of Systems   Constitutional: Negative for chills and fever.   HENT: Negative for tinnitus.    Eyes: Negative for blurred vision and double vision.   Cardiovascular: Positive for leg swelling (left leg due to knee ). Negative for chest pain and palpitations.   Gastrointestinal: Positive for constipation and heartburn. Negative for abdominal pain, diarrhea, nausea and vomiting.   Genitourinary: Negative for dysuria and hematuria.   Musculoskeletal: Positive for back pain and falls (8/2019). Negative for joint pain and neck pain.   Skin: Negative for rash.   Neurological: Negative for dizziness, tingling and headaches.   Psychiatric/Behavioral: Negative for depression and suicidal ideas.  "The patient is nervous/anxious.        PE:  Vitals:    02/06/20 1054   BP: 118/72   Pulse: 104   Weight: 132.9 kg (292 lb 15.9 oz)   Height: 5' 3" (1.6 m)       Physical Exam   Constitutional: She is oriented to person, place, and time. She appears well-developed and well-nourished.   HENT:   Head: Normocephalic and atraumatic.   Eyes: Pupils are equal, round, and reactive to light. Conjunctivae and EOM are normal.   Neck: Normal range of motion. Neck supple. No JVD present.   Cardiovascular: Normal rate, regular rhythm, normal heart sounds and intact distal pulses.   No murmur heard.  Pulmonary/Chest: Effort normal and breath sounds normal. She has no wheezes.   Abdominal: Soft. Bowel sounds are normal. There is no tenderness.   Musculoskeletal: Normal range of motion. She exhibits no edema or tenderness.   Neurological: She is alert and oriented to person, place, and time. Coordination normal.   Skin: Skin is warm and dry. Capillary refill takes less than 2 seconds. She is not diaphoretic.   Ecchymosis noted on abdomen from heparin shots    Psychiatric: She has a normal mood and affect. Her behavior is normal. Judgment and thought content normal.        DIAGNOSIS:  1. Morbid Obesity with Body mass index is 51.9 kg/m². and inability to lose weight and maintain weight loss .  2. Co-morbidities: GERD     PLAN:  The patient is a good candidate for Bariatric Surgery. she is interested in sleeve gastrectomy with Dr. Merchant. The surgery and post-op care were discussed in detail with the patient. All questions were answered.      Discussed the possibility of GERD getting worse with gastric sleeve, pt states understanding feels like it would be better fit for her and the weight she needs to lose, states that reflux is controlled with medications will see her GI doctor (Dr. Brenner on 3/11/2020).    she understands that bariatric surgery is a tool to aid in weight loss and that she needs to be committed to the diet and " exercise post-operatively for successful weight loss.  Discussed expected weight loss outcomes after surgery which is 50% of the excess weight on her frame.  Goal weight is 150 #s.  Discussed with patient that bariatric surgery is not the easy way out and that it will take plenty of dedication on the patient's part to be successful. Also discussed the possibility of weight regain if the patient strays from the diet guidelines or exercise requirements. Patient verbalized understanding and wishes to proceed with the work-up.    ORDERS:  1. Stress Test, Chest X-Ray and EKG  2. Psychological Consult and Bariatric Dietician Consult  3. Bariatric Labs: BMP, CBC, Folate Serum, H. pylori, HgA1C, Hepatic Panel/LFT, Iron & TIBC, Lipid Profile, Magnesium, Phosphate, T3, T4, TSH, Free T4, Vitamin B12, and Vitamin B1.  4.No NSAIDS after surgery due to increased risk of stomach ulcers. Talk to your prescribing provider about alternative options for your pain.  5. Discussed the possibility of hyper-fertility surrounding 2 week liquid diet before and after surgery: advised patient to use 2 forms of birth control during this time period to avoid conception.  Patient verbalized understanding and will discuss options with her GYN.      Primary Physician: Irvin Baumann MD  RTC: As scheduled.    60 minute visit, over 50% of time spent counseling patient face to face on surgical options, risks, benefits, expected diet, recommended exercise regimen, and expected weight loss.

## 2020-02-06 NOTE — PROGRESS NOTES
NUTRITIONAL CONSULT    Referring Physician: Jarred Merchant M.D.  Reason for MNT Referral: Initial assessment for sleeve gastrectomy work-up    PAST MEDICAL HISTORY:   34 y.o. female  Body mass index is 51.9 kg/m².     Weight history includes Pt states that she has always been overweight. States that her lowest adult weight was around 150 - 160 lbs at the age of 24.  Pt increased weight gain after her son, states then worse after she miscarried her twins. Got  and gained 50 lbs. States that she also had multiple surgeries which decreased activity. The patient has tried no carb (20 lb weight loss) Plexis (no weight loss), portion control, OTC weight loss medication ( gained weight). 300 lbs highest weight. Pt would like to weigh 150 lbs.    Past Medical History:   Diagnosis Date    Allergy     Anxiety     BMI 50.0-59.9, adult     GERD (gastroesophageal reflux disease)     Hypoglycemic disorder     Obesity     Thyroid disease     Vertigo        CLINICAL DATA:  34 y.o.-year-old White female.  Height: 5 ft. 3 in.  Weight: 292 lbs  IBW: 135 lbs  BMI: 51.9  The patient's goal weight (50% EBW): 214 lbs  Personal goal weight: 150 lbs    Goal for Bariatric Surgery: to improve health, to improve quality of life, to lose weight and to prevent future medical conditions    NUTRITIONAL NEEDS:  3496-1751 Calories (for 1-2 lbs wt loss per week)   Grams Protein    NUTRITION & HEALTH HISTORY:  Greatest challenge: dining out frequency, sweets, starchy CHO, portion control, snacking at night and emotional eating    Current diet recall:     6:30am: 1 cup raisin bran with 2% milk  Back to sleep until 1pm  1:30pm: salad with lettuce, honey ham deli slice, turkey deli slice, 6 croutons, reed bits, shredded cheese, Italian dressing drizzled   6pm: small bag kettle corn popcorn  8pm: no sugar added fudgsicle    *5 bottles water throughout the day    Current Diet:  Meal pattern: 2-3 meals  Protein supplements: None.  Has used [employer] in the past and likes.  Snacking: states not a snacker   Vegetables: Likes a variety. Eats 2-3 times per week.  Fruits: Likes a variety. Eats 2-3 times per week.  Beverages: water, diet tea, coffee without sugar and 2% milk  Dining out: 3 times Weekly. Mostly fast food, restaurants and take-out.  Cooking at home: Daily. Weekly. Mostly baked, grilled and smothered meat, fish, starchy CHO and vegetables. Smothered chicken/pork chops with cooked veg.    Exercise:  Past exercise: None    Current exercise: None  Restrictions to exercise: none. Fatigue    Vitamins / Minerals / Herbs:   none      Labs:   none available at this time    Food Allergies:   Nuts, sesame seeds    Social:  No work.  Lives with her  and 3 kids (10, 9, 3).  Grocery shopping and food prep done by the pt.  Patient believes the household will be supportive after surgery.  is trying to lose weight and get healthier too. Her dad had the Sleeve and is doing great.   Alcohol: Socially. Zohra wine, drinks a bottle, twice/month.  Smoking: None.    ASSESSMENT:  · Patient reports attempts at weight loss, only to regain lost weight.  · Patient demonstrated knowledge of healthy eating behaviors and exercise patterns; admits to not eating healthy and not exercising at this point.  · Patient states willingness and demonstrates willingness to change lifestyle and make behavior modifications as evidenced by dietary changes and healthier cooking at home.    Insurance requires 6 months medically supervised diet prior to consideration for bariatric surgery.    Barriers to Education: none    Stage of change: determination    NUTRITION DIAGNOSIS:    Obesity related to Excessive carbohydrate intake and Physical inactivity as evidence by BMI.    BARIATRIC DIET DISCUSSION/PLAN:  Discussed diet after surgery and related to patient's food record.  Reviewed nutrition guidelines for before and after surgery.  Answered all questions.  Continue to  review Bariatric Nutrition Guidebook at home and call with any questions.  Work on Bariatric Nutrition Checklist.  Work on expanding variety of vegetables.  Work on gradually cutting back on starchy CHO in the diet.  1500-calorie diet.  5-6 meals per day.  Start including protein supplements in the diet plan daily.  Increase exercise.    RECOMMENDATIONS:  Patient is a potential candidate for bariatric surgery - Sleeve.    RTC in one month.  Needs 5 additional visit(s) with RD for MSD.    Patient and Spouse verbalized understanding.    Expect fair  compliance after surgery at this time.    Communicated nutrition plan with bariatric team.    SESSION TIME:  60 minutes

## 2020-02-06 NOTE — PATIENT INSTRUCTIONS
Prior to surgery you will need to complete:  - Dietitian consult and follow up appointments as needed  - Labs  - Chest X-ray  - EKG  - Psychological evaluation, Please call psychiatry [phone] to schedule  - Stress test     In preparation for bariatric surgery, please complete the following:   · Discuss your current medications with your primary care provider, remember your medications will need to be crushed, chewable, or in liquid form for the first 3-6 months after a gastric bypass or sleeve.  For a gastric band, your medications will need to be crushed indefinitely.    · If you take a blood thinner such as: Coumadin (warfarin), Pradaxa (dabigatran), or Plavix (clopidogrel), you will need to speak with your prescribing provider on how or if this medication can be stopped before surgery.   · If you take a medication for depression or anxiety, you will need to begin crushing or opening the capsule 1-3 months prior to surgery.  Remember to discuss this with the psychologist or psychiatrist that you see.   · If you take medication for arthritis on a daily basis that is considered a non-steroidal anti-inflammatory (NSAID), please discuss with your prescribing physician an alternative medication.  After having gastric bypass or gastric sleeve, this group of medications is not appropriate to take due to increased risk of bleeding stomach ulcers.      DEFINITIONS  Appointments: Pre-scheduled meetings or consultations with any physician, advanced practice provider, dietitian, or psychologist, and labs, imaging studies, sleep studies, etc.   Late cancellation: Cancelling an appointment 24-48 hours prior to scheduled time.  No-Show appointment:  is when    You do NOT arrive to your appointment at the time its scheduled.   You call to cancel or cancel via [facility] less than 24 hours in advance of your scheduled appointment   You show up 15 minutes AFTER your scheduled appointment time without any notification of  being late.

## 2020-02-06 NOTE — PATIENT INSTRUCTIONS
1200- 1500 calorie Sample meal plan  80-120g protein per day.   Protein drinks and bars: 0-4 grams sugar  Drink lots of water throughout the day and exercise!  MENU # 1  Breakfast: 2 eggs, 1 turkey sausage abbe, 1 apple  Snack: Atkins bar  Lunch: 2 roll-ups (sliced turkey, low-fat sliced cheese, mustard), 12 baby carrots dipped in 1 Tbsp natural peanut butter  Mid-Day Snack: ¼ cup unsalted almonds, ½ cup fruit  Dinner: 1 chicken thigh simmered in tomato sauce + 2 Tbsp mozzarella cheese, ½ cup black beans, 1/2 cup steamed carrots  Evening Snack: 1/2 cup grapes with 4 cubes low-fat cheddar cheese   ___________________________________________________  MENU # 2  Breakfast: 200 calorie protein drink  Mid-morning snack : ¼ cup unsalted nuts, medium banana  Lunch: 3oz tuna or chicken salad made with 2 tbsp light thompson, over salad with tomatoes and cucumbers.   Snack: low-fat cheese stick  Dinner: 3oz hamburger abbe, slice of low-fat cheese, 1 cup boiled yellow squash and zucchini.   Snack: 6oz light yogurt  _______________________________________________________  Menu #3  Breakfast: 6oz plain Greek yogurt + fruit (½ banana, ½ cup fruit - pineapple, blueberries, strawberries, peach), may add Splenda to paul.  Lunch: Grilled  chicken breast w/ slice low-fat pepper dandy cheese, 1/2 cup grilled/baked asparagus and small salad with Salad Spritzer.    Mid-Day snack: 200 calorie protein drink   Dinner: 4oz Grilled fish, ½ cup white beans, ½ cup cooked spinach  Evening Snack: fudgsicle no-sugar-added    Menu # 4  Breakfast: 1 scoop vanilla protein powder + 4oz skim milk + 4oz coffee   Snack: Pure protein bar  Lunch: 2 Lettuce tacos: 3oz seasoned ground turkey wrapped in a Lorenzo lettuce leaves with 1 Tbsp shredded cheese and dollop low-fat sour cream  Snack: ½ cup cottage cheese, ½ cup pineapple chunks  Dinner: Shrimp omelet: 2 eggs, ½ cup shrimp, green onions, and shredded  cheese  ______________________________________________________  Menu #5  Breakfast: 1 cup low-fat cottage cheese, ½ cup peaches (no sugar added)  Snack: 1 apple, 4 cubes cheese  Lunch: 3oz baked pork chop, 1 cup okra and tomato stew  Snack: 1/2 cup black beans + salsa + dollop light sour cream  Dinner: Caprese chicken salad: 3 oz chicken breast, 1oz fresh mozzarella, sliced tomato, 1 Tbsp olive oil, basil  Snack: sugar-free popsicle    Menu #6  Breakfast: ½ cup part-skim ricotta cheese, 2 Tbsp sugar-free strawberry preserves, sprinkle of slivered almonds  Snack: 1 orange  Lunch: 3 oz canned chicken, 1oz shredded cheddar cheese, ½  cup black beans, 2 Tbsp salsa  Snack: 200 calorie Protein drink  Dinner: 4 oz grilled salmon steak, over mixed green salad with 2 tbsp light dressing    Meal Ideas for Regular Bariatric Diet  *Recipes and products available at [email or web address].com      Breakfast: (15-20g protein)    - Egg white omelet: 2 egg whites or ½ cup Egg Beaters. (Optional proteins: cheese, shrimp, black beans, chicken, sliced turkey) (Optional veggies: tomatoes, salsa, spinach, mushrooms, onions, green peppers, or small slice avocado)     - Egg and sausage: 1 egg or ¼ cup Egg Beaters (any variety), with 1 abbe or 2 links of Turkey sausage or Veggie breakfast sausage ([employer] or [employer])    - Crust-less breakfast quiche: To make a glass pie dish, mix 4oz part skim Ricotta, 1 cup skim milk, and 2 eggs as your base. Add protein: shredded cheese, sliced lean ham or turkey, turkey reed/sausage. Add veggies: tomato, onion, green onion, mushroom, green pepper, spinach, etc.    - Yogurt parfait: Mix 1 - 6oz container Dannon Light N Fit vanilla yogurt, with ¼ cup crushed unsalted nuts    - Cottage cheese and fruit: ½ cup part-skim cottage cheese or ricotta cheese topped with fresh fruit or sugar free preserves     - Elaine Asencio's Vanilla Egg custard* (add 2 Tbsp instant coffee granules to make Cappuccino  Custard*)    - Hi-Protein café latte (skim milk, decaf coffee, 1 scoop protein powder). Optional to add Sugar free syrup or extract flavoring.    - Breakfast Lox: spread fat free cream cheese on slices of smoked salmon. Serve over scrambled or egg over easy (sauteed with nonstick cookspray) OR on a cucumber slice    - Eggwhich: Scramble or cook 1 large egg over easy using nonstick cookspray. Place between 2 slices of German reed and low fat cheese.     Lunch: (20-30g protein)    - ½ cup Black bean soup (Homemade or Progresso), with ¼ cup shredded low-fat cheese. Top with chopped tomato or fresh salsa.     - Lean deli turkey breast and low-fat sliced cheese, mustard or light thompson to moisten, rolled up together, or wrapped in a Lorenzo lettuce leaf    - Chicken salad made from dinner leftovers, moisten with low-fat salad dressing or light thompson. Also try leftover salmon, shrimp, tuna or boiled eggs. Serve ½ cup over dark green salad    - Fat-free canned refried beans, topped with ¼ cup shredded low-fat cheese. Top with chopped tomato or fresh salsa.     - Greek salad: Top mixed greens with 1-2oz grilled chicken, tomatoes, red onions, 2-3 kalamata olives, and sprinkle lightly with feta cheese. Spritz with Balsamic vinegar to taste.     - Crust-less lunch quiche: To make a glass pie dish, mix 4oz part skim Ricotta, 1 cup skim milk, and 2 eggs as your base. Add protein: shredded cheese, sliced lean ham or turkey, shrimp, chicken. Add veggies: tomato, onion, green onion, mushroom, green pepper, spinach, artichoke, broccoli, etc.    - Pizza bake: spread a  navarro susan mushroom with tomato sauce, low-fat shredded mozzarella and turkey pepperoni or [place] reed. Add any veggies. Roast for 10-15 minutes, until cheese melted.     - Cucumber crab bites: Spread ¼ cup crab dip (lump crabmeat + light cream cheese and green onions) over sliced cucumber.     - Chicken with light spinach and artichoke dip*: Puree in food  processor: 6oz cooked and drained spinach, 2 cloves garlic, 1 can cannelloni beans, ½ cup chopped green onions, 1 can drained artichoke hearts (not marinated in oil), lemon juice and basil. Mix in 2oz chopped up chicken.    Supper: (20-30g protein)    - Serve grilled fish over dark green salad tossed with low-fat dressing, served with grilled asparagus conner     - Rotisserie chicken salad: served with sliced strawberries, walnuts, fat-free feta cheese crumbles and 1 tbsp Eliass Own Light Raspberry Cologne Vinaigrette    - Shrimp cocktail: Dip cold boiled shrimp in homemade low-sugar cocktail sauce (1/2 cup Boris One Carb ketchup, 2 tbsp horseradish, 1/4 tsp hot sauce, 1 tsp Worcestershire sauce, 1 tbsp freshly-squeezed lemon juice). Serve with dark green salad, walnuts, and crumbled blue cheese drizzled with olive oil and Balsamic vinegar    - Tuna Melt: Spread tuna salad onto 2 thick slices of tomato. Top with low-fat cheese and broil until cheese is melted. May also be made with chicken salad of shrimp salad. [place] with different types of cheeses.    - Chicken or beef fajitas (no tortilla, rice, beans, chips). Top meat and veggies w/ fresh salsa, fat free sour cream.     - Homemade low-fat Chili using extra lean ground beef or ground turkey. Top with shredded cheese and salsa as desired. May add dollop fat-free sour cream if desired    - Chicken parmesan: Top chicken breast w/ low sugar marinara sauce, mozzarella cheese and bake until chicken reaches 165*.  Serve w/ spaghetti SQUASH or Mongolian cut green beans    - Dinner Omelet with shrimp or chicken and onion, green peppers and chives.    - No noodle lasagna: Use sliced zucchini or eggplant in place of noodles.  Layer with part skim ricotta cheese and low sugar meat sauce (use very lean ground beef or ground turkey).    - Mexican chicken bake: Bake chunks of chicken breast or thigh with taco seasoning, Pace brand enchilada sauce, green onions and low-fat  cheese. Serve with ¼ cup black beans or fat free refried beans topped with chopped tomatoes or salsa.    - Aparna frozen meatballs, simmered in Classico Marinara sauce. Different flavors of salsa or spaghetti sauce create different dishes! Sprinkle with parmesan cheese. Serve with grilled or steamed veggies, or a dark green salad.    - Simmer boneless skinless chicken thigh chunks in Classico Marinara sauce or roasted salsa until tender with chopped onion, bell pepper, garlic, mushrooms, spinach, etc.     - Hamburger or veggie burger, without the bun, dressed the way you like. Served with grilled or steamed veggies.    - Eggplant parmesan: Bake slices of eggplant at 350 degrees for 15 minutes. Layer tomato sauce, sliced eggplant and low-fat mozzarella cheese in a baking dish and cover with foil. Bake 30-40 more minutes or until bubbly. Uncover and bake at 400 degrees for about 15 more minutes, or until top is slightly crisp.    - Fish tacos: grilled/baked white fish, wrapped in Lorenzo lettuce leaf, topped with salsa, shredded low-fat cheese, and light coleslaw.    - Chicken renan: Sprinkle chicken w/ 1 tsp of hidden valley ranch dip mix. Then grill chicken and top with black beans, salsa and 1 tsp fat free sour cream.     - Cauliflower pizza crust: Use cauliflower as crust (see recipe on bertrand, no flour!). Top w/ low fat cheese, turkey pepperoni and veggies and bake again    - chicken or turkey crust pizza: use ground chicken or turkey instead of cauliflower, spread in [place] and bake at 350 for about 20-30 minutes(may want to add garlic, black pepper, oregano and other herbs to ground meat mixture).  Remove and top w/ low fat cheese, turkey pepperoni and veggies and bake again for another 10 minutes or until cheese is browned.     Snacks: (100-200 calories; >5g protein)    - 1 low-fat cheese stick with 8 cherry tomatoes or 1 serving fresh fruit  - 4 thin slices fat-free turkey breast and 1 slice low-fat  cheese  - 4 thin slices fat-free honey ham with wedge of melon  - 6-8 edamame pods (equivalent to about 1/4 cup edamame without pods).   - 1/4 cup unsalted nuts with ½ cup fruit  - 6-oz container Dannon Light n Fit vanilla yogurt, topped with 1oz unsalted nuts         - apple, celery or baby carrots spread with 2 Tbsp PB2  - apple slices with 1 oz slice low-fat cheese  - Apple slices dipped in 2 Tbsp of PB2  - celery, cucumber, bell pepper or baby carrots dipped in ¼ cup hummus bean spread or light spinach and artichoke dip (*recipe in lunch section)  - celery, cucumber, baby carrots dipped in high protein greek yogurt (Mix 16 oz plain greek yogurt + 1 packet of hidden valley ranch dip mix)  - Jose Links Beef Steak - 14g protein! (similar to beef jerky)  - 2 wedges Laughing Cow - Light Herb & Garlic Cheese with sliced cucumber or green bell pepper  - 1/2 cup low-fat cottage cheese with ¼ cup fruit or ¼ cup salsa  - RTD Protein drinks: Atkins, Low Carb Slim Fast, EAS light, Muscle Milk Light, etc.  - Homemade Protein drinks: GNC Soy95, Isopure, Nectar, UNJURY, Whey Gourmet, etc. Mix 1 scoop powder with 8oz skim/1% milk or light soymilk.  - Protein bars: Atkins, EAS, Pure Protein, Think Thin, Detour, etc. Must have 0-4 grams sugar - Read the label.    Takeout Options: No more than twice/week  Deli - Salads (no pasta or rice), meats, cheeses. Roasted chicken. Lox (salmon)    Mexican - Platters which don't include tortillas, chips, or rice. Go easy on the beans. Example: Fajitas without the tortillas. Ask the  not to bring chips to the table if they are too tempting.    Greek - Meat or fish and vegetable, but no bread or rice. Including hummus, baba ganoush, etc, is OK. Most sit-down Greek restaurants can provide you with cucumber slices for dipping instead of talia bread.    Fast Food (Avoid as much as possible) - Salads (no croutons and limit salad dressing to 2 tbsp), grilled chicken sandwich without the bun  and ask for no thompson. Isabels low fat chili or Taco Bell pintos and cheese.    BBQ - The meats are fine if you ask for sauces on the side, but most of the traditional side dishes are loaded with carbs. Sandro slaw, baked beans and BBQ sauce are typically made with sugar.    Chinese - Nothing deep-fried, no rice or noodles. Many Chinese sauces have starch and sugar in them, so you'll have to use your judgement. If you find that these sauces trigger cravings, or cause Dumping, you can ask for the sauce to be made without sugar or just use soy sauce.

## 2020-02-12 ENCOUNTER — PATIENT MESSAGE (OUTPATIENT)
Dept: ALLERGY | Facility: CLINIC | Age: 35
End: 2020-02-12

## 2020-02-19 ENCOUNTER — HOSPITAL ENCOUNTER (OUTPATIENT)
Dept: RADIOLOGY | Facility: HOSPITAL | Age: 35
Discharge: HOME | End: 2020-02-19
Attending: ORTHOPAEDIC SURGERY
Payer: COMMERCIAL

## 2020-02-19 ENCOUNTER — INITIAL CONSULT (OUTPATIENT)
Dept: ORTHOPEDICS | Facility: CLINIC | Age: 35
End: 2020-02-19
Payer: COMMERCIAL

## 2020-02-19 VITALS — WEIGHT: 293 LBS | BODY MASS INDEX: 51.91 KG/M2 | HEIGHT: 63 IN

## 2020-02-19 DIAGNOSIS — M51.36 DDD (DEGENERATIVE DISC DISEASE), LUMBAR: ICD-10-CM

## 2020-02-19 DIAGNOSIS — M54.9 BACK PAIN, UNSPECIFIED BACK LOCATION, UNSPECIFIED BACK PAIN LATERALITY, UNSPECIFIED CHRONICITY: Primary | ICD-10-CM

## 2020-02-19 PROCEDURE — 3008F BODY MASS INDEX DOCD: CPT | Mod: CPTII,S$GLB,, | Performed by: ORTHOPAEDIC SURGERY

## 2020-02-19 PROCEDURE — 72120 XR LUMBAR SPINE AP AND LAT WITH FLEX/EXT: ICD-10-PCS | Mod: 26,,, | Performed by: RADIOLOGY

## 2020-02-19 PROCEDURE — 99204 PR OFFICE/OUTPT VISIT, NEW, LEVL IV, 45-59 MIN: ICD-10-PCS | Mod: S$GLB,,, | Performed by: ORTHOPAEDIC SURGERY

## 2020-02-19 PROCEDURE — 99999 PR PBB SHADOW E&M-EST. PATIENT-LVL III: CPT | Mod: PBBFAC,,, | Performed by: ORTHOPAEDIC SURGERY

## 2020-02-19 PROCEDURE — 99204 OFFICE O/P NEW MOD 45 MIN: CPT | Mod: S$GLB,,, | Performed by: ORTHOPAEDIC SURGERY

## 2020-02-19 PROCEDURE — 72100 XR LUMBAR SPINE AP AND LAT WITH FLEX/EXT: ICD-10-PCS | Mod: 26,,, | Performed by: RADIOLOGY

## 2020-02-19 PROCEDURE — 72100 X-RAY EXAM L-S SPINE 2/3 VWS: CPT | Mod: TC

## 2020-02-19 PROCEDURE — 72120 X-RAY BEND ONLY L-S SPINE: CPT | Mod: 26,,, | Performed by: RADIOLOGY

## 2020-02-19 PROCEDURE — 3008F PR BODY MASS INDEX (BMI) DOCUMENTED: ICD-10-PCS | Mod: CPTII,S$GLB,, | Performed by: ORTHOPAEDIC SURGERY

## 2020-02-19 PROCEDURE — 99999 PR PBB SHADOW E&M-EST. PATIENT-LVL III: ICD-10-PCS | Mod: PBBFAC,,, | Performed by: ORTHOPAEDIC SURGERY

## 2020-02-19 PROCEDURE — 72100 X-RAY EXAM L-S SPINE 2/3 VWS: CPT | Mod: 26,,, | Performed by: RADIOLOGY

## 2020-02-19 NOTE — PROGRESS NOTES
DATE: 2/19/2020  PATIENT: June Reyes    Attending Physician: Watson Bynum M.D.    CHIEF COMPLAINT: low back pain    HISTORY:  June Reyes is a 34 y.o. female who is here for initial evaluation of low back pain (Back - 7,  No leg pain. She reports that she was diagnosed with scoliosis in 3rd grade, never had any bracing or surgery. In the meantime, since delivering her son 9 years ago, she's noticed worsening pain. Then, 6 months ago in September, the patient fell in a restaurant, had a significant knee injury that was repaired by Dr Antonio. Since then, she's noticed low back pain that has been much worse. She has tried only a few sessions of PT and no injections. She's also tried a chiropractor without much improvement.   Of note, patient is going to bariatric clinic for evaluation for weight loss procedure. The Patient denies myelopathic symptoms such as handwriting changes or difficulty with buttons/coins/keys. Denies perineal paresthesias, bowel/bladder dysfunction.    PAST MEDICAL/SURGICAL HISTORY:  Past Medical History:   Diagnosis Date    Allergy     Anxiety     BMI 50.0-59.9, adult     GERD (gastroesophageal reflux disease)     Hypoglycemic disorder     Obesity     Thyroid disease     Vertigo      Past Surgical History:   Procedure Laterality Date    ADENOIDECTOMY      ARTHROSCOPIC CHONDROPLASTY OF KNEE JOINT Left 9/6/2019    Procedure: ARTHROSCOPY, KNEE, WITH CHONDROPLASTY;  Surgeon: Sravan Antonio MD;  Location: [facility] OR;  Service: Orthopedics;  Laterality: Left;    ARTHROSCOPIC DEBRIDEMENT OF SHOULDER Right 3/1/2019    Procedure: DEBRIDEMENT, SHOULDER, ARTHROSCOPIC;  Surgeon: Sravan Antonio MD;  Location: [facility] OR;  Service: Orthopedics;  Laterality: Right;  labral debridement    ARTHROSCOPIC REPAIR OF ROTATOR CUFF OF SHOULDER Right 3/1/2019    Procedure: REPAIR, ROTATOR CUFF, ARTHROSCOPIC;  Surgeon: Sravan Antonio MD;  Location: [facility] OR;  Service: Orthopedics;   Laterality: Right;  subscapularis  Regeneten implant    ARTHROSCOPY OF KNEE Left 2019    Procedure: ARTHROSCOPY, KNEE;  Surgeon: Sravan Antonio MD;  Location: [facility] OR;  Service: Orthopedics;  Laterality: Left;  regional w/o catheter / adductor     ARTHROSCOPY OF SHOULDER WITH DECOMPRESSION OF SUBACROMIAL SPACE Right 3/1/2019    Procedure: ARTHROSCOPY, SHOULDER, WITH SUBACROMIAL SPACE DECOMPRESSION;  Surgeon: Sravan Antonio MD;  Location: [facility] OR;  Service: Orthopedics;  Laterality: Right;     SECTION      DILATION AND CURETTAGE OF UTERUS      ESOPHAGOGASTRODUODENOSCOPY N/A 2020    Procedure: EGD (ESOPHAGOGASTRODUODENOSCOPY);  Surgeon: Erinn Brenner MD;  Location: [facility] ENDO;  Service: Endoscopy;  Laterality: N/A;    GANGLION CYST EXCISION      RECONSTRUCTION OF LIGAMENT Left 2019    Procedure: RECONSTRUCTION, LIGAMENT;  Surgeon: Sravan Antonio MD;  Location: [facility] OR;  Service: Orthopedics;  Laterality: Left;  ACL    REPAIR OF MENISCUS OF KNEE Left 2019    Procedure: REPAIR, MENISCUS, KNEE;  Surgeon: Sravan Antonio MD;  Location: [facility] OR;  Service: Orthopedics;  Laterality: Left;  lateral    TONSILLECTOMY         Current Medications:   Current Outpatient Medications:     diphenhydrAMINE (BENADRYL) 50 MG capsule, Take 1 capsule (50 mg total) by mouth every 6 (six) hours as needed for Itching or Allergies., Disp: 20 capsule, Rfl: 0    EPINEPHrine (EPIPEN) 0.3 mg/0.3 mL AtIn, Inject 0.6 mLs (0.6 mg total) into the muscle as needed (for severe allergic reaction)., Disp: 2 Device, Rfl: 0    escitalopram oxalate (LEXAPRO) 20 MG tablet, Take 1 tablet (20 mg total) by mouth once daily. (Patient taking differently: Take 30 mg by mouth once daily. ), Disp: 30 tablet, Rfl: 11    Lactobacillus rhamnosus GG (CULTURELLE) 10 billion cell capsule, Take 1 capsule by mouth daily as needed (stomach upset)., Disp: , Rfl:     levocetirizine (XYZAL) 5 MG tablet, Take 1  tablet (5 mg total) by mouth 2 (two) times daily., Disp: 90 tablet, Rfl: 3    lubiprostone (AMITIZA) 24 MCG Cap, Take 1 capsule (24 mcg total) by mouth 2 (two) times daily with meals. (Patient taking differently: Take 24 mcg by mouth 2 (two) times daily as needed (stomach). ), Disp: 180 capsule, Rfl: 3    omeprazole (PRILOSEC) 40 MG capsule, Take 40 mg by mouth once daily., Disp: , Rfl:     ondansetron (ZOFRAN-ODT) 8 MG TbDL, Take 8 mg by mouth every 6 (six) hours as needed (nausea)., Disp: , Rfl:     Social History:   Social History     Socioeconomic History    Marital status:      Spouse name: Not on file    Number of children: Not on file    Years of education: Not on file    Highest education level: Not on file   Occupational History    Not on file   Social Needs    Financial resource strain: Not on file    Food insecurity:     Worry: Not on file     Inability: Not on file    Transportation needs:     Medical: Not on file     Non-medical: Not on file   Tobacco Use    Smoking status: Former Smoker     Types: Cigarettes     Last attempt to quit: 2017     Years since quittin.9    Smokeless tobacco: Never Used   Substance and Sexual Activity    Alcohol use: Yes     Comment: occ    Drug use: No    Sexual activity: Yes     Partners: Male     Birth control/protection: Other-see comments     Comment:  vasectomy   Lifestyle    Physical activity:     Days per week: Not on file     Minutes per session: Not on file    Stress: Not on file   Relationships    Social connections:     Talks on phone: Not on file     Gets together: Not on file     Attends Hoahaoism service: Not on file     Active member of club or organization: Not on file     Attends meetings of clubs or organizations: Not on file     Relationship status: Not on file   Other Topics Concern    Not on file   Social History Narrative    Not on file       REVIEW OF SYSTEMS:  Constitution: Negative. Negative for chills,  "fever and night sweats.   Cardiovascular: Negative for chest pain and syncope.   Respiratory: Negative for cough and shortness of breath.   Gastrointestinal: See HPI. Negative for nausea/vomiting. Negative for abdominal pain.  Genitourinary: See HPI. Negative for discoloration or dysuria.  Hematologic/Lymphatic: neg for bleeding/clotting disorders.   Musculoskeletal: Negative for falls and muscle weakness.   Neurological: See HPI. neg history of seizures. neg history of cranial surgery or shunts.  Neurological: See HPI. No seizures.   Endocrine: Negative for polydipsia, polyphagia and polyuria.   Allergic/Immunologic: Negative for hives and persistent infections.     EXAM:  Ht 5' 3" (1.6 m)   Wt 133.7 kg (294 lb 12.1 oz)   BMI 52.21 kg/m²     PHYSICAL EXAMINATION:    General: The patient is a Obese 34 y.o. female in no apparent distress, the patient is orientatied to person, place and time.  Psych: Normal mood and affect  HEENT: Vision grossly intact, hearing intact to the spoken word.  Lungs: Respirations unlabored.  Gait: Normal station and gait, no difficulty with toe or heel walk.   Skin: Dorsal lumbar skin negative for rashes, lesions, hairy patches and surgical scars. There is mild lumbar tenderness to palpation.  Range of motion: Lumbar range of motion is acceptable.  Spinal Balance: Global saggital and coronal spinal balance acceptable, no significant for scoliosis and kyphosis.  Musculoskeletal: No pain with the range of motion of the bilateral hips. No trochanteric tenderness to palpation.  Vascular: Bilateral lower extremities warm and well perfused, Dorsalis pedis pulses 2+ bilaterally.  Neurological: Normal strength and tone in all major motor groups in the bilateral lower extremities. Normal sensation to light touch in the L2-S1 dermatomes bilaterally.  Deep tendon reflexes symmetric  in the bilateral lower extremities.  Negative Babinski bilaterally. Straight leg raise negative " bilaterally.    IMAGING:      Today I personally reviewed AP, Lat and Flex/Ex  upright L-spine that demonstrate normal alinement       Body mass index is 52.21 kg/m².  No results found for: HGBA1C    ASSESSMENT/PLAN:    June was seen today for pain.    Diagnoses and all orders for this visit:    Back pain, unspecified back location, unspecified back pain laterality, unspecified chronicity      No follow-ups on file.    Low back pain: will have patient complete her evaluation by Dr Merchant for bariatric surgery. Patient may benefit from PT after she has lost some weight.

## 2020-03-05 ENCOUNTER — CLINICAL SUPPORT (OUTPATIENT)
Dept: BARIATRICS | Facility: CLINIC | Age: 35
End: 2020-03-05
Payer: COMMERCIAL

## 2020-03-05 VITALS — BODY MASS INDEX: 51.98 KG/M2 | WEIGHT: 293 LBS

## 2020-03-05 DIAGNOSIS — E66.01 MORBID OBESITY WITH BMI OF 50.0-59.9, ADULT: ICD-10-CM

## 2020-03-05 DIAGNOSIS — Z71.3 DIETARY COUNSELING: ICD-10-CM

## 2020-03-05 PROCEDURE — 99999 PR PBB SHADOW E&M-EST. PATIENT-LVL I: ICD-10-PCS | Mod: PBBFAC,,, | Performed by: DIETITIAN, REGISTERED

## 2020-03-05 PROCEDURE — 99499 UNLISTED E&M SERVICE: CPT | Mod: S$GLB,,, | Performed by: DIETITIAN, REGISTERED

## 2020-03-05 PROCEDURE — 97803 MED NUTRITION INDIV SUBSEQ: CPT | Mod: S$GLB,,, | Performed by: DIETITIAN, REGISTERED

## 2020-03-05 PROCEDURE — 97803 PR MED NUTR THER, SUBSQ, INDIV, EA 15 MIN: ICD-10-PCS | Mod: S$GLB,,, | Performed by: DIETITIAN, REGISTERED

## 2020-03-05 PROCEDURE — 99499 NO LOS: ICD-10-PCS | Mod: S$GLB,,, | Performed by: DIETITIAN, REGISTERED

## 2020-03-05 PROCEDURE — 99999 PR PBB SHADOW E&M-EST. PATIENT-LVL I: CPT | Mod: PBBFAC,,, | Performed by: DIETITIAN, REGISTERED

## 2020-03-11 ENCOUNTER — PATIENT MESSAGE (OUTPATIENT)
Dept: GASTROENTEROLOGY | Facility: CLINIC | Age: 35
End: 2020-03-11

## 2020-03-26 ENCOUNTER — OFFICE VISIT (OUTPATIENT)
Dept: GASTROENTEROLOGY | Facility: CLINIC | Age: 35
End: 2020-03-26
Payer: COMMERCIAL

## 2020-03-26 ENCOUNTER — PATIENT MESSAGE (OUTPATIENT)
Dept: GASTROENTEROLOGY | Facility: CLINIC | Age: 35
End: 2020-03-26

## 2020-03-26 DIAGNOSIS — K59.04 CHRONIC IDIOPATHIC CONSTIPATION: ICD-10-CM

## 2020-03-26 DIAGNOSIS — K21.9 GASTROESOPHAGEAL REFLUX DISEASE WITHOUT ESOPHAGITIS: Primary | ICD-10-CM

## 2020-03-26 PROCEDURE — 99213 PR OFFICE/OUTPT VISIT, EST, LEVL III, 20-29 MIN: ICD-10-PCS | Mod: 95,,, | Performed by: INTERNAL MEDICINE

## 2020-03-26 PROCEDURE — 99213 OFFICE O/P EST LOW 20 MIN: CPT | Mod: 95,,, | Performed by: INTERNAL MEDICINE

## 2020-03-26 NOTE — PROGRESS NOTES
Subjective:       Patient ID: June Reyes is a 34 y.o. female.    Chief Complaint: Constipation and Gastroesophageal Reflux    The patient location is: [place]  The chief complaint leading to consultation is: F/u constipation and GERD  Visit type: Virtual visit with synchronous audio and video  Total time spent with patient: 10 mins  Each patient to whom he or she provides medical services by telemedicine is:  (1) informed of the relationship between the physician and patient and the respective role of any other health care provider with respect to management of the patient; and (2) notified that he or she may decline to receive medical services by telemedicine and may withdraw from such care at any time.    Notes: 35 yo F following up for constipation and GERD.  She states that she is doing well.  The Amitiza caused severe diarrhea when taken daily, so she attempted qod dosing, but that was also too much.  She is now using the Amitiza prn only which is approximately once per week.  He is happy with this regimen and is getting good emptying.  She is doing well with PPI.  If she forgets a dose, symptoms recur so she tries not to forget.  The past few months have been difficult due to new found severe nut allergy with anaphylaxis and hospitalization.      Review of Systems   Constitutional: Negative for appetite change.   Respiratory: Negative for chest tightness, shortness of breath and wheezing.    Cardiovascular: Negative for chest pain, palpitations and leg swelling.       Objective:      Physical Exam   Constitutional: She is oriented to person, place, and time. She appears well-developed and well-nourished.   HENT:   Head: Normocephalic and atraumatic.   Eyes: Pupils are equal, round, and reactive to light. EOM are normal.   Neurological: She is alert and oriented to person, place, and time.   Psychiatric: She has a normal mood and affect. Her behavior is normal.       EGD 1/2020:  12 mm inlet patch, HH, gastritis HP  (-), celiac (-), tortuous esophagus  Esophagram (-)  GES (-)    Assessment:       1. Gastroesophageal reflux disease without esophagitis    2. Chronic idiopathic constipation        Plan:       Gastroesophageal reflux disease without esophagitis        -     Cont PPI    Chronic idiopathic constipation        -     Ok to use Amitiza prn    RTC as needed

## 2020-03-31 ENCOUNTER — PATIENT MESSAGE (OUTPATIENT)
Dept: ALLERGY | Facility: CLINIC | Age: 35
End: 2020-03-31

## 2020-03-31 ENCOUNTER — PATIENT MESSAGE (OUTPATIENT)
Dept: BARIATRICS | Facility: CLINIC | Age: 35
End: 2020-03-31

## 2020-04-01 ENCOUNTER — PATIENT MESSAGE (OUTPATIENT)
Dept: ALLERGY | Facility: CLINIC | Age: 35
End: 2020-04-01

## 2020-04-01 ENCOUNTER — OFFICE VISIT (OUTPATIENT)
Dept: ALLERGY | Facility: CLINIC | Age: 35
End: 2020-04-01
Payer: COMMERCIAL

## 2020-04-01 VITALS — HEIGHT: 63 IN | BODY MASS INDEX: 50.5 KG/M2 | WEIGHT: 285 LBS

## 2020-04-01 DIAGNOSIS — J30.1 SEASONAL ALLERGIC RHINITIS DUE TO POLLEN: ICD-10-CM

## 2020-04-01 DIAGNOSIS — J01.90 ACUTE SINUSITIS, RECURRENCE NOT SPECIFIED, UNSPECIFIED LOCATION: Primary | ICD-10-CM

## 2020-04-01 DIAGNOSIS — J30.89 ALLERGIC RHINITIS DUE TO HOUSE DUST MITE: ICD-10-CM

## 2020-04-01 PROCEDURE — 3008F BODY MASS INDEX DOCD: CPT | Mod: CPTII,S$GLB,, | Performed by: STUDENT IN AN ORGANIZED HEALTH CARE EDUCATION/TRAINING PROGRAM

## 2020-04-01 PROCEDURE — 3008F PR BODY MASS INDEX (BMI) DOCUMENTED: ICD-10-PCS | Mod: CPTII,S$GLB,, | Performed by: STUDENT IN AN ORGANIZED HEALTH CARE EDUCATION/TRAINING PROGRAM

## 2020-04-01 PROCEDURE — 99213 PR OFFICE/OUTPT VISIT, EST, LEVL III, 20-29 MIN: ICD-10-PCS | Mod: 95,,, | Performed by: STUDENT IN AN ORGANIZED HEALTH CARE EDUCATION/TRAINING PROGRAM

## 2020-04-01 PROCEDURE — 99213 OFFICE O/P EST LOW 20 MIN: CPT | Mod: 95,,, | Performed by: STUDENT IN AN ORGANIZED HEALTH CARE EDUCATION/TRAINING PROGRAM

## 2020-04-01 RX ORDER — AZITHROMYCIN 250 MG/1
TABLET, FILM COATED ORAL
Qty: 6 TABLET | Refills: 0 | Status: SHIPPED | OUTPATIENT
Start: 2020-04-01 | End: 2020-04-06

## 2020-04-01 NOTE — PROGRESS NOTES
Allergy Clinic Note  [facility]    The patient location is: [place]  The chief complaint leading to consultation is: nasal congestion and cough  Visit type: Virtual visit with synchronous audio and video  Total time spent with patient: aprox 10 min  Each patient to whom he or she provides medical services by telemedicine is:  (1) informed of the relationship between the physician and patient and the respective role of any other health care provider with respect to management of the patient; and (2) notified that he or she may decline to receive medical services by telemedicine and may withdraw from such care at any time.      Subjective:      Patient ID: June Reyes is a 34 y.o. female.    Chief Complaint: Other (no sense of smell or taste, cough, no fever, nasal congestion)      Allergy problem list:   Two episodes of sensation of throat closing        With cough, SOB, throat itching, ST, and runny nose         ADM X1         Normal ENT endoscopy during hoarseness and throat constriction feeling  Dust allergy  Pollen allergy:  Grass > tree    History of Present Illness:  34-year-old female seen initially following 2 episodes of apparent anaphylaxis is doing of a virtual visit for increased rhinitis symptoms.    Patient has a history of seasonal rhinitis including grass and tree pollens.  She reports pending increased time outdoors over the weekend.  Two days ago she began with a severe cough the level of her throat associated with nasal congestion, loss of smell, loss of sense of taste.  Other associated symptoms include loss of sense of taste and loss of sense of smell.  She denies any fever, shortness of breath, wheezing, chest pain, or chest symptoms.  She has been taking her regular spring time medicine of Xyzal and has recently added Robitussin and honey and Mucinex with modest benefit.  Associated symptoms include some increased ear pressure which is now resolved and some severe itching which  "improved with 3 tablets of Xyzal yesterday.  She also a admits facial pressure and that her nose sometimes gets completely blocked.  She says when she feels this way she is not helped by nasal sprays or nasal rinses.  She has her best relief from the packs.  She has not taken antibiotics in the last 6 weeks.    She reports her  had similar symptoms a few days prior to her hers.  She says that he tested negative for the corona virus.    Patient also has a history of "episodes but none since last visit.  She reports that she has been avoiding tree nuts and sesame seeds under denies any accidental exposures.  She does have an EpiPen.    History obtained last visit:  During recent hospitalization patient was complaining of, among other things, the sensation of throat tightening and hoarseness.  ENT endoscopy during symptoms failed to show any swelling, narrowing or other abnormality except for enlarged tonsils. A tryptase level done mid hospitalization is same as her baseline.  According to the hospitalist, her symptoms waxed and waned during her admission.  The tryptase level was drawn shortly after the onset of 1 of these periods of increased symptoms.  Following the ENT evaluation, the hospitalist group and I decided to deescalate care.    At her initial visit she reported 2 episodes as follows:    The more severe of the 2 episodes occurred 01/10/2020 with a chronology as follows.  She felt well during the day.  She ate dinner at [employer] at aproximately 6:45 consisting of a hamburger, wheat bread, sweet potato fries.  While at the restaurant she noticed some hand itching.  After leaving the restaurant she went to the [facility] and to [facility].  She had no significant symptoms but in retrospect her  told her she was rubbing her arms.  They next went to a friend's house around 10:40 p.m. where there were 2 dogs.  Within 10 min she developed sudden throat itching, a deep cough, shortness of " breath, a sore throat, and runny nose.   The left the house due to these symptoms.  While driving symptoms worsened and they decided to go to the emergency room.  Upon arrival in the emergency room, she was complaining of cough, shortness of breath, red/watery eyes.  She states she felt in a fog and remembers very few details.  She says she also felt like her neck was big.  She says the nurse said it looked like her throat was closing.  She admits to hoarseness and reports a panicky sensation.  She responded rapidly to treatment in the emergency department and was discharged home.    She had a similar but milder episode 2 days before her initial visit.  This consisted of itchy throat, cough, difficulty taking a deep breath, and questionable sensation of throat closing.  She treated herself with Benadryl and Pepcid prior to presenting to the emergency room.      She denies the possibility of sting with either episode.  She also denies taking aspirin, NSAIDs, or anything other than her usual medicines.  At the time her usual medicines consisted of Lexapro, Prilosec, Zofran.  (note that there are other medications listed on her med list but she says she has not taken them in quite a while.  In particular, she denies taking the Celebrex.)     Current medications from the emergency room are  EpiPen  Prednisone 1 tablet on taper  Pepcid 20 mg twice a day  Benadryl 50 mg as needed     Currently she complains of diffuse itching without rash.    Other allergic syndromes include:  1.  Cats.  Exposure to cats cause eyelid swelling  2.  Walnuts.  Several years ago contact with walnuts caused a rash and she had a positive skin test.  More recently she had an accidental walnut ingestion and immediately took a Benadryl.  She had no symptoms.  3.  Watermelon and cantaloupe.  Consuming these foods causes an itchy throat.    She reports that she is intolerant of either Zyrtec or Zyrtec D which causes palpitations.      Additional  History:   Past medical history is significant for hypoglycemia and GERD.  She is status post tonsillectomy.  Family history is significant for father and brother with rhinitis.  There is no family history of asthma.  Client  reports that she quit smoking about 3 years ago. Her smoking use included cigarettes. She has never used smokeless tobacco.   No exposure to mold.     Patient Active Problem List   Diagnosis    Nontraumatic tear of right rotator cuff    Morbid obesity with BMI of 50.0-59.9, adult    Biceps tendinitis of right upper extremity    Bilateral numbness and tingling of arms and legs    Left anterior cruciate ligament tear    Gastroesophageal reflux disease without esophagitis    Insomnia    Chronic idiopathic constipation    Abdominal pain, generalized    Anaphylactic syndrome    Leukemoid reaction    Dysphonia    Dyspnea    Hyperthyroidism     Current Outpatient Medications on File Prior to Visit   Medication Sig Dispense Refill    escitalopram oxalate (LEXAPRO) 20 MG tablet Take 1 tablet (20 mg total) by mouth once daily. (Patient taking differently: Take 30 mg by mouth once daily. ) 30 tablet 11    Lactobacillus rhamnosus GG (CULTURELLE) 10 billion cell capsule Take 1 capsule by mouth daily as needed (stomach upset).      levocetirizine (XYZAL) 5 MG tablet Take 1 tablet (5 mg total) by mouth 2 (two) times daily. 90 tablet 3    omeprazole (PRILOSEC) 40 MG capsule Take 40 mg by mouth once daily.      diphenhydrAMINE (BENADRYL) 50 MG capsule Take 1 capsule (50 mg total) by mouth every 6 (six) hours as needed for Itching or Allergies. (Patient not taking: Reported on 4/1/2020) 20 capsule 0    EPINEPHrine (EPIPEN) 0.3 mg/0.3 mL AtIn Inject 0.6 mLs (0.6 mg total) into the muscle as needed (for severe allergic reaction). (Patient not taking: Reported on 4/1/2020) 2 Device 0    lubiprostone (AMITIZA) 24 MCG Cap Take 1 capsule (24 mcg total) by mouth 2 (two) times daily with meals.  "(Patient not taking: Reported on 4/1/2020) 180 capsule 3    ondansetron (ZOFRAN-ODT) 8 MG TbDL Take 8 mg by mouth every 6 (six) hours as needed (nausea).       No current facility-administered medications on file prior to visit.          Review of Systems   Constitutional: Negative for chills and fever.   HENT: Positive for congestion, ear pain (resolved) and sinus pain. Negative for ear discharge and nosebleeds.    Eyes: Negative for discharge and redness.   Respiratory: Positive for cough. Negative for hemoptysis, sputum production, shortness of breath, wheezing and stridor.    Cardiovascular: Negative for chest pain and palpitations.   Gastrointestinal: Negative for blood in stool, melena and vomiting.   Genitourinary: Negative for dysuria and hematuria.   Skin: Negative for itching and rash.   Neurological: Negative for seizures and loss of consciousness.       Objective:   Ht 5' 3" (1.6 m)   Wt 129.3 kg (285 lb)   BMI 50.49 kg/m²       Physical Exam   Constitutional: She is well-developed, well-nourished, and in no distress.   HENT:   Head: Normocephalic and atraumatic.   Nose: Nose normal.   Eyes: Conjunctivae are normal. Right eye exhibits no discharge. Left eye exhibits no discharge.   Neck: Neck supple.   Pulmonary/Chest: Effort normal. No stridor. No respiratory distress.   Abdominal: She exhibits no distension.   Musculoskeletal: She exhibits no edema or deformity.   Neurological: She is alert. GCS score is 15.   Skin: No rash noted. No erythema.   Psychiatric: Memory and affect normal.       Data:   Allergy testing by the Immunocap method (01/16/2020 was most notable for positive reactions to dust mites and grass.  She also had lower level reactions to other aeroallergens and borderline reactions to a number of foods.  Aeroallergens:   Class IV  dust mite (Df), Bermuda grass, Ramiro grass   Class III   dust mite (Dp), ragweed, dog   Class II    English plantain, oak, pecan, marsh elder, Alternaria, " cat  Foods:   Class II     sesame seed   Class I      corn, walnut, pistachio, hazelnut, almond,   Latex:   Class II  All other allergens less than 0.35 KU/L  Total serum IgE 264.    Baseline tryptase 4.6 (01/16/2020)  Tryptase mid hospitalization 4.8 (01/28/2020)    Demonstrated excellent use of Epi autoinjector  at initial visit        Assessment:     1. Acute sinusitis, recurrence not specified, unspecified location    2. Seasonal allergic rhinitis due to pollen    3. Allergic rhinitis due to house dust mite        Plan:     Medical decision making:  Client is presenting with acute sinusitis superimposed on seasonal allergic rhinitis.  She denies any treatment by nose.  I recommend she continue her Mucinex and will add Z-Francesco antibiotic.  She is to contact me if she does not feel better.  If she develops any fever or shortness of breath, I recommended Coronavirus testing.          June was seen today for other.    Diagnoses and all orders for this visit:    Acute sinusitis, recurrence not specified, unspecified location    Seasonal allergic rhinitis due to pollen    Allergic rhinitis due to house dust mite        Patient Instructions     Add Z pack    Continue other medicines.    If not improving in 2 days   - contact me   - consider Coronovirus testing      Follow up in about 1 year (around 4/1/2021), or if symptoms worsen or fail to improve.    Pennie Philip MD

## 2020-04-01 NOTE — PATIENT INSTRUCTIONS
Add Z pack    Continue other medicines.    If not improving in 2 days   - contact me   - consider Coronovirus testing

## 2020-04-03 ENCOUNTER — PATIENT MESSAGE (OUTPATIENT)
Dept: ALLERGY | Facility: CLINIC | Age: 35
End: 2020-04-03

## 2020-04-06 ENCOUNTER — PATIENT MESSAGE (OUTPATIENT)
Dept: GASTROENTEROLOGY | Facility: CLINIC | Age: 35
End: 2020-04-06

## 2020-04-06 ENCOUNTER — PATIENT MESSAGE (OUTPATIENT)
Dept: BARIATRICS | Facility: CLINIC | Age: 35
End: 2020-04-06

## 2020-04-06 ENCOUNTER — PATIENT MESSAGE (OUTPATIENT)
Dept: ALLERGY | Facility: CLINIC | Age: 35
End: 2020-04-06

## 2020-04-09 ENCOUNTER — PATIENT MESSAGE (OUTPATIENT)
Dept: BARIATRICS | Facility: CLINIC | Age: 35
End: 2020-04-09

## 2020-04-14 ENCOUNTER — TELEPHONE (OUTPATIENT)
Dept: BARIATRICS | Facility: CLINIC | Age: 35
End: 2020-04-14

## 2020-04-17 ENCOUNTER — CLINICAL SUPPORT (OUTPATIENT)
Dept: BARIATRICS | Facility: CLINIC | Age: 35
End: 2020-04-17
Payer: COMMERCIAL

## 2020-04-17 DIAGNOSIS — Z71.3 DIETARY COUNSELING: ICD-10-CM

## 2020-04-17 DIAGNOSIS — E66.01 MORBID OBESITY WITH BMI OF 50.0-59.9, ADULT: ICD-10-CM

## 2020-04-17 PROCEDURE — 97803 PR MED NUTR THER, SUBSQ, INDIV, EA 15 MIN: ICD-10-PCS | Mod: 95,,, | Performed by: DIETITIAN, REGISTERED

## 2020-04-17 PROCEDURE — 97803 MED NUTRITION INDIV SUBSEQ: CPT | Mod: 95,,, | Performed by: DIETITIAN, REGISTERED

## 2020-04-17 PROCEDURE — 99499 NO LOS: ICD-10-PCS | Mod: 95,,, | Performed by: DIETITIAN, REGISTERED

## 2020-04-17 PROCEDURE — 99499 UNLISTED E&M SERVICE: CPT | Mod: 95,,, | Performed by: DIETITIAN, REGISTERED

## 2020-04-17 NOTE — PROGRESS NOTES
The patient location is: home ([place])  The chief complaint leading to consultation is: morbid obesity  Visit type: audiovisual  Total time spent with patient: 30 min  Each patient to whom he or she provides medical services by telemedicine is:  (1) informed of the relationship between the physician and patient and the respective role of any other health care provider with respect to management of the patient; and (2) notified that he or she may decline to receive medical services by telemedicine and may withdraw from such care at any time.    NUTRITION NOTE     Referring Physician: Jarred Merchant M.D.  Reason for MNT Referral: 6 months Medically Supervised Diet pending Gastric Sleeve     Patient presents for 3rd visit for MSD with 10 lbs weight loss over the past month. She is recovering from coronavirus. Slowly getting her strength back. She is now able to eat food again (has her taste/smell back) and she states she is back on her diet plan. She admits she has tried a few bites of ice cream or chips or chinese food but just doesn't have a taste for it anymore. Craving healthier foods. As she gets stronger she continues to increase daily steps. Currently at 2524-3048 steps/day from walking, yardwork and housework. Her SHARONDA is still in the hospital with the virus which is a source of stress, but he is improving.     CLINICAL DATA:  34 y.o. female.     Current Weight: 283 lbs  Weight Change Since Initial Visit: - 9 lbs  Body mass index is 50     CURRENT DIET:  Reduced-calorie diet. Low carb  Verbal Diet Recall: Food records are not present.     B: prot fruit smoothie  L: turkey/swiss roll up with raw veggies OR lettuce wrap with turkey and avocado  - Protein 2.0   - turkey jerky stick or cheese stick  D: grilled chicken or steak dipped in guacamole, with stir melara veggies or asparagus     Diet Includes:   Meal Pattern: Improved.  Protein Supplements: 1-2 per day. Protein 2.0. Organic prot powder + water + 1 cup frozen  mixed fruits (strawberries, pineapples, peaches and blueberries).  Snacking: Adequate. Snacks include healthy choices.  Vegetables: Likes a variety. Eats daily.  Fruits: Likes a variety. Eats daily.  Beverages: water and sugar-free beverages  Dining Out:  Weekly. Mostly fast food, restaurants and take-out.  Cooking at home: Daily. Weekly. Mostly baked, grilled and smothered meat, fish, starchy CHO and vegetables.     CURRENT EXERCISE:  Walking, yard work, house work     Fit bit displays 5571-5851 steps/day.     Food Allergies:   Nuts, sesame seeds     Social:  No work.  Lives with her  and 3 kids (10, 9, 3).  Grocery shopping and food prep done by the pt.  Patient believes the household will be supportive after surgery.  is trying to lose weight and get healthier too. Her dad had the Sleeve and is doing great.   Alcohol: Socially. Zohra wine, drinks a bottle, twice/month.  Smoking: None.     ASSESSMENT:  Patient demonstrates willingness to change lifestyle habits as evidenced by weight loss, exercise, dietary changes, including protein drinks, increased fruits, increased vegetables, more food preparation at home and healthier cooking at home.     Doing well with working on greatest challenges (starchy CHO and irregular meal patterns).     Barriers to Education:  none  Stage of Change:  action     NUTRITION DIAGNOSIS:  Morbid Obesity related to Excessive carbohydrate intake as evidence by BMI.  Status: Improved     PLAN:     Consistency with diet and exercise plan.     Weight loss goal prior to surgery (5-10% TBW): 15-30 lbs     Follow up in one month.  Needs additional visit(s) with RD for MSD.     Communicated nutrition plan with bariatric team.     SESSION TIME:  30 minutes

## 2020-05-14 ENCOUNTER — PATIENT MESSAGE (OUTPATIENT)
Dept: BARIATRICS | Facility: CLINIC | Age: 35
End: 2020-05-14

## 2020-05-21 ENCOUNTER — PATIENT MESSAGE (OUTPATIENT)
Dept: BARIATRICS | Facility: CLINIC | Age: 35
End: 2020-05-21

## 2020-05-21 ENCOUNTER — CLINICAL SUPPORT (OUTPATIENT)
Dept: BARIATRICS | Facility: CLINIC | Age: 35
End: 2020-05-21
Payer: COMMERCIAL

## 2020-05-21 DIAGNOSIS — E66.01 MORBID OBESITY WITH BMI OF 50.0-59.9, ADULT: ICD-10-CM

## 2020-05-21 DIAGNOSIS — Z71.3 DIETARY COUNSELING: ICD-10-CM

## 2020-05-21 PROCEDURE — 99499 UNLISTED E&M SERVICE: CPT | Mod: 95,,, | Performed by: DIETITIAN, REGISTERED

## 2020-05-21 PROCEDURE — 97803 MED NUTRITION INDIV SUBSEQ: CPT | Mod: 95,,, | Performed by: DIETITIAN, REGISTERED

## 2020-05-21 PROCEDURE — 97803 PR MED NUTR THER, SUBSQ, INDIV, EA 15 MIN: ICD-10-PCS | Mod: 95,,, | Performed by: DIETITIAN, REGISTERED

## 2020-05-21 PROCEDURE — 99499 NO LOS: ICD-10-PCS | Mod: 95,,, | Performed by: DIETITIAN, REGISTERED

## 2020-05-21 NOTE — PROGRESS NOTES
The patient location is: home ([place])  The chief complaint leading to consultation is: morbid obesity    Visit type: audiovisual    Face to Face time with patient: 30 min  45 minutes of total time spent on the encounter, which includes face to face time and non-face to face time preparing to see the patient (eg, review of tests), Obtaining and/or reviewing separately obtained history, Documenting clinical information in the electronic or other health record, Independently interpreting results (not separately reported) and communicating results to the patient/family/caregiver, or Care coordination (not separately reported).         Each patient to whom he or she provides medical services by telemedicine is:  (1) informed of the relationship between the physician and patient and the respective role of any other health care provider with respect to management of the patient; and (2) notified that he or she may decline to receive medical services by telemedicine and may withdraw from such care at any time.    NUTRITION NOTE     Referring Physician: Jarred Merchant M.D.  Reason for MNT Referral: 6 months Medically Supervised Diet pending Gastric Sleeve     Patient presents for 4th visit for MSD with self reported weight at a standstill over the past month. She is feeling back to normal since recovering from coronavirus.  Currently at [id number] steps/day from walking, yardwork and housework. Her SHARONDA is back home and back to normal. MIL tested positive.      CLINICAL DATA:  34 y.o. female.     Current Weight: 284 lbs  Weight Change Since Initial Visit: - 8 lbs  Body mass index is 50     CURRENT DIET:  Reduced-calorie diet. Low carb  Verbal Diet Recall: Food records are not present.     B: prot 2.0  L: 2 slices veggie pizza  - handful cheeze its  D: small bowl green beans smothered with ham, peas     Diet Includes:   Meal Pattern: Improved.  Protein Supplements: 1-2 per day. Protein 2.0. Organic prot powder + water + 1 cup  frozen mixed fruits (strawberries, pineapples, peaches and blueberries).  Snacking: Adequate. Snacks include healthy choices. Handful Cheezits, 6-10 animal crackers, Activia yogurt  Vegetables: Likes a variety. Eats daily.  Fruits: Likes a variety. Eats daily.  Beverages: water and sugar-free beverages  Dining Out:  Weekly. Mostly fast food, restaurants and take-out.  Cooking at home: Daily. Weekly. Mostly baked, grilled and smothered meat, fish, starchy CHO and vegetables.     CURRENT EXERCISE:  Staying active with kids. Walking, yard work, house work       Food Allergies:   Nuts, sesame seeds     Social:  No work.  Lives with her  and 3 kids (10, 9, 3).  Grocery shopping and food prep done by the pt.  Patient believes the household will be supportive after surgery.  is trying to lose weight and get healthier too. Her dad had the Sleeve and is doing great.   Alcohol: Socially. Zohra wine, drinks a bottle, twice/month.  Smoking: None.     ASSESSMENT:  Patient demonstrates willingness to change lifestyle habits as evidenced by weight loss, exercise, dietary changes, including protein drinks, increased fruits, increased vegetables, more food preparation at home and healthier cooking at home.     Doing well with working on greatest challenges (starchy CHO and irregular meal patterns).     Barriers to Education:  none  Stage of Change:  action     NUTRITION DIAGNOSIS:  Morbid Obesity related to Excessive carbohydrate intake as evidence by BMI.  Status: Improved     PLAN:    Focus goals:  Healthy snacking: Cheese Whisps, greek yogurt, prot puff chips     Consistency with diet and exercise plan.     Weight loss goal prior to surgery (5-10% TBW): 15-30 lbs     Follow up in one month.  Needs additional visit(s) with RD for MSD.     Communicated nutrition plan with bariatric team.     SESSION TIME:  30 minutes

## 2020-06-05 ENCOUNTER — OFFICE VISIT (OUTPATIENT)
Dept: SPORTS MEDICINE | Facility: CLINIC | Age: 35
End: 2020-06-05
Payer: COMMERCIAL

## 2020-06-05 ENCOUNTER — HOSPITAL ENCOUNTER (OUTPATIENT)
Dept: RADIOLOGY | Facility: HOSPITAL | Age: 35
Discharge: HOME | End: 2020-06-05
Attending: PHYSICIAN ASSISTANT
Payer: COMMERCIAL

## 2020-06-05 VITALS
HEART RATE: 84 BPM | WEIGHT: 285 LBS | SYSTOLIC BLOOD PRESSURE: 149 MMHG | DIASTOLIC BLOOD PRESSURE: 85 MMHG | BODY MASS INDEX: 50.5 KG/M2 | HEIGHT: 63 IN

## 2020-06-05 DIAGNOSIS — M25.511 RIGHT SHOULDER PAIN, UNSPECIFIED CHRONICITY: Primary | ICD-10-CM

## 2020-06-05 DIAGNOSIS — M25.511 RIGHT SHOULDER PAIN, UNSPECIFIED CHRONICITY: ICD-10-CM

## 2020-06-05 PROCEDURE — 73030 X-RAY EXAM OF SHOULDER: CPT | Mod: TC,RT

## 2020-06-05 PROCEDURE — 73030 X-RAY EXAM OF SHOULDER: CPT | Mod: 26,RT,, | Performed by: RADIOLOGY

## 2020-06-05 PROCEDURE — 99213 PR OFFICE/OUTPT VISIT, EST, LEVL III, 20-29 MIN: ICD-10-PCS | Mod: S$GLB,,, | Performed by: PHYSICIAN ASSISTANT

## 2020-06-05 PROCEDURE — 73030 XR SHOULDER COMPLETE 2 OR MORE VIEWS RIGHT: ICD-10-PCS | Mod: 26,RT,, | Performed by: RADIOLOGY

## 2020-06-05 PROCEDURE — 3008F BODY MASS INDEX DOCD: CPT | Mod: CPTII,S$GLB,, | Performed by: PHYSICIAN ASSISTANT

## 2020-06-05 PROCEDURE — 99213 OFFICE O/P EST LOW 20 MIN: CPT | Mod: S$GLB,,, | Performed by: PHYSICIAN ASSISTANT

## 2020-06-05 PROCEDURE — 3008F PR BODY MASS INDEX (BMI) DOCUMENTED: ICD-10-PCS | Mod: CPTII,S$GLB,, | Performed by: PHYSICIAN ASSISTANT

## 2020-06-05 PROCEDURE — 99999 PR PBB SHADOW E&M-EST. PATIENT-LVL IV: ICD-10-PCS | Mod: PBBFAC,,, | Performed by: PHYSICIAN ASSISTANT

## 2020-06-05 PROCEDURE — 99999 PR PBB SHADOW E&M-EST. PATIENT-LVL IV: CPT | Mod: PBBFAC,,, | Performed by: PHYSICIAN ASSISTANT

## 2020-06-05 NOTE — PROGRESS NOTES
"CC: RIGHT shoulder pain     34 y.o. Female with a history of right shoulder pain who presents for evaluation. Patient is s/p right shoulder arthroscopic RCR and subpectoral biceps tenodesis with Dr. Antonio below. She reports approximately 4 weeks of right shoulder pain that has been increasing in intensity over the past couple days. She denies any new falls or trauma and does not recall any event or activity that she can relate to the pain. She states most of her pain is located in the anterior shoulder and notes a "crunching" sound when lifting her arm above her head. She states the pain is affecting her sleep. She has been doing HEP a few times a week and using her TENS unit for her symptoms with some relief. She reports associated weakness and decreased ROM.     She reports that the pain and weakness is worse with overhead activity. It also bothers her at night.    Is affecting ADLs.  Pain is 6/10 at it's worst.    DATE OF SURGERY:3/1/2019        PREOPERATIVE DIAGNOSES:   1. Right shoulder rotator cuff tear (subscapularis / supraspinatus)   2. Right shoulder biceps tendinopathy  3. Right shoulder labral tear     POSTOPERATIVE DIAGNOSES:   1. Right shoulder rotator cuff tear  (subscapularis / supraspinatus)  2. Right shoulder biceps tendinopathy  3. Right shoulder labral tear        PROCEDURE:   1. Right shoulder arthroscopic rotator cuff repair (subscapularis)  2. Placement of a right shoulder Regeneten implant on posterosuperior rotator cuff  3. Right shoulder arthroscopic biceps autotenodesis  4. Right shoulder arthroscopic subacromial decompression.  5. Right shoulder arthroscopic debridement labrum     Past Medical History:   Diagnosis Date    Allergy     Anxiety     BMI 50.0-59.9, adult     GERD (gastroesophageal reflux disease)     Hypoglycemic disorder     Obesity     Thyroid disease     Vertigo        Past Surgical History:   Procedure Laterality Date    ADENOIDECTOMY      ARTHROSCOPIC " CHONDROPLASTY OF KNEE JOINT Left 2019    Procedure: ARTHROSCOPY, KNEE, WITH CHONDROPLASTY;  Surgeon: Sravan Antonio MD;  Location: [facility] OR;  Service: Orthopedics;  Laterality: Left;    ARTHROSCOPIC DEBRIDEMENT OF SHOULDER Right 3/1/2019    Procedure: DEBRIDEMENT, SHOULDER, ARTHROSCOPIC;  Surgeon: Sravan Antonio MD;  Location: [facility] OR;  Service: Orthopedics;  Laterality: Right;  labral debridement    ARTHROSCOPIC REPAIR OF ROTATOR CUFF OF SHOULDER Right 3/1/2019    Procedure: REPAIR, ROTATOR CUFF, ARTHROSCOPIC;  Surgeon: Sravan Antonio MD;  Location: [facility] OR;  Service: Orthopedics;  Laterality: Right;  subscapularis  Regeneten implant    ARTHROSCOPY OF KNEE Left 2019    Procedure: ARTHROSCOPY, KNEE;  Surgeon: Sravan Antonio MD;  Location: [facility] OR;  Service: Orthopedics;  Laterality: Left;  regional w/o catheter / adductor     ARTHROSCOPY OF SHOULDER WITH DECOMPRESSION OF SUBACROMIAL SPACE Right 3/1/2019    Procedure: ARTHROSCOPY, SHOULDER, WITH SUBACROMIAL SPACE DECOMPRESSION;  Surgeon: Sravan Antonio MD;  Location: [facility] OR;  Service: Orthopedics;  Laterality: Right;     SECTION      DILATION AND CURETTAGE OF UTERUS      ESOPHAGOGASTRODUODENOSCOPY N/A 2020    Procedure: EGD (ESOPHAGOGASTRODUODENOSCOPY);  Surgeon: Erinn Brenner MD;  Location: [facility];  Service: Endoscopy;  Laterality: N/A;    GANGLION CYST EXCISION      RECONSTRUCTION OF LIGAMENT Left 2019    Procedure: RECONSTRUCTION, LIGAMENT;  Surgeon: Sravan Antonio MD;  Location: [facility] OR;  Service: Orthopedics;  Laterality: Left;  ACL    REPAIR OF MENISCUS OF KNEE Left 2019    Procedure: REPAIR, MENISCUS, KNEE;  Surgeon: Sravan Antonio MD;  Location: [facility] OR;  Service: Orthopedics;  Laterality: Left;  lateral    TONSILLECTOMY         Family History   Problem Relation Age of Onset    Diabetes Mother     Hypertension Mother     Depression Mother     Achalasia Mother      Hypertension Father     Heart disease Father     Allergies Father     Cancer Maternal Aunt     Diabetes Paternal Grandfather     Autism Brother     Allergies Son     Allergic rhinitis Neg Hx     Angioedema Neg Hx     Asthma Neg Hx     Atopy Neg Hx     Eczema Neg Hx     Immunodeficiency Neg Hx     Rhinitis Neg Hx     Urticaria Neg Hx          Current Outpatient Medications:     escitalopram oxalate (LEXAPRO) 20 MG tablet, Take 1 tablet (20 mg total) by mouth once daily. (Patient taking differently: Take 30 mg by mouth once daily. ), Disp: 30 tablet, Rfl: 11    Lactobacillus rhamnosus GG (CULTURELLE) 10 billion cell capsule, Take 1 capsule by mouth daily as needed (stomach upset)., Disp: , Rfl:     levocetirizine (XYZAL) 5 MG tablet, Take 1 tablet (5 mg total) by mouth 2 (two) times daily., Disp: 90 tablet, Rfl: 3    omeprazole (PRILOSEC) 40 MG capsule, Take 40 mg by mouth once daily., Disp: , Rfl:     ondansetron (ZOFRAN-ODT) 8 MG TbDL, Take 8 mg by mouth every 6 (six) hours as needed (nausea)., Disp: , Rfl:     diphenhydrAMINE (BENADRYL) 50 MG capsule, Take 1 capsule (50 mg total) by mouth every 6 (six) hours as needed for Itching or Allergies. (Patient not taking: Reported on 4/1/2020), Disp: 20 capsule, Rfl: 0    EPINEPHrine (EPIPEN) 0.3 mg/0.3 mL AtIn, Inject 0.6 mLs (0.6 mg total) into the muscle as needed (for severe allergic reaction). (Patient not taking: Reported on 4/1/2020), Disp: 2 Device, Rfl: 0    lubiprostone (AMITIZA) 24 MCG Cap, Take 1 capsule (24 mcg total) by mouth 2 (two) times daily with meals. (Patient not taking: Reported on 4/1/2020), Disp: 180 capsule, Rfl: 3    Review of patient's allergies indicates:   Allergen Reactions    Nuts [tree nut] Anaphylaxis     Ashley allergy, Peanut allergy    Sesame seed Anaphylaxis          REVIEW OF SYSTEMS:  Constitution: Negative. Negative for chills, fever and night sweats.   HENT: Negative for congestion and headaches.    Eyes:  "Negative for blurred vision, left vision loss and right vision loss.   Cardiovascular: Negative for chest pain and syncope.   Respiratory: Negative for cough and shortness of breath.    Endocrine: Negative for polydipsia, polyphagia and polyuria.   Hematologic/Lymphatic: Negative for bleeding problem. Does not bruise/bleed easily.   Skin: Negative for dry skin, itching and rash.   Musculoskeletal: Negative for falls.  Positive for right shoulder pain and muscle weakness.   Gastrointestinal: Negative for abdominal pain and bowel incontinence.   Genitourinary: Negative for bladder incontinence and nocturia.   Neurological: Negative for disturbances in coordination, loss of balance and seizures.   Psychiatric/Behavioral: Negative for depression. The patient does not have insomnia.    Allergic/Immunologic: Negative for hives and persistent infections.      PHYSICAL EXAMINATION:  Vitals:  BP (!) 149/85   Pulse 84   Ht 5' 3" (1.6 m)   Wt 129.3 kg (285 lb)   BMI 50.49 kg/m²    General: The patient is alert and oriented x 3.  Mood is pleasant.  Observation of ears, eyes and nose reveal no gross abnormalities.  No labored breathing observed.  Gait is coordinated. Patient can toe walk and heel walk without difficulty.      RIGHT SHOULDER / UPPER EXTREMITY EXAM    OBSERVATION:     Swelling  none  Deformity  none   Discoloration  none   Scapular winging none   Scars   none  Atrophy  none    TENDERNESS / CREPITUS (T/C):          T/C      T/C   Clavicle   -/-  SUPRAspinatus    -/-     AC Jt.    -/-  INFRAspinatus  -/-    SC Jt.    -/-  Deltoid    -/-      G. Tuberosity  -/-  LH BICEP groove  +/-   Acromion:  -/-  Midline Neck   -/-     Scapular Spine -/-  Trapezium   -/-   SMA Scapula  -/-  GH jt. line - post  -/-     Scapulothoracic  +/-         ROM: (* = with pain)  Left shoulder   Right shoulder        AROM (PROM)   AROM (PROM)   FE    170° (175°)     100°* (130°*)     ER at 0°    60°  (65°)    40°*  (50°*)    IR (spine " level)   T10     L2    STRENGTH: (* = with pain) Left shoulder   Right shoulder   SCAPTION   5/5    4/5    IR    5/5    4/5   ER    5/5    4/5   BICEPS   5/5    4/5   Deltoid    5/5    4/5     SIGNS:  Painful side       NEER   +    ODENEENS  +    JACOBO   +    SPEEDS  neg     DROP ARM   +   BELLY PRESS neg   Superior escape none    LIFT-OFF  neg   X-Body ADD    neg    MOVING VALGUS neg        STABILITY TESTING    Left shoulder   Right shoulder    Translation     Anterior  up face     up face    Posterior  up face    up face    Sulcus   < 10mm    < 10 mm     Signs   Apprehension   neg      neg       Relocation   no change     no change      Jerk test  neg     neg    EXTREMITY NEURO-VASCULAR EXAM:    Sensation grossly intact to light touch all dermatomal regions.    DTR 2+ Biceps, Triceps, BR and Negative Donalds sign   Grossly intact motor function at Elbow, Wrist and Hand   Distal pulses radial and ulnar 2+, brisk cap refill, symmetric.      NECK:  Painless FROM and spinous processes non-tender. Negative Spurlings sign.      OTHER FINDINGS:  + scapular dyskinesia    XRAYS (6/5/2020):  FINDINGS:  Two views: No fracture dislocation bone destruction seen.      ASSESSMENT:  Right shoulder pain, possible biceps tendinitis, rotator cuff pathology      PLAN:      1. MRI to rule out rotator cuff pathology  2. Instructed patient to treat conservatively in the interim with activity modification, icing after activity, OTC Tylenol/NSAIDs, and TENS  3. Follow up in clinic to discuss MRI results    All questions were answered, patient will contact us for questions or concerns in the interim.

## 2020-06-12 ENCOUNTER — TELEPHONE (OUTPATIENT)
Dept: SPORTS MEDICINE | Facility: CLINIC | Age: 35
End: 2020-06-12

## 2020-06-12 ENCOUNTER — PATIENT MESSAGE (OUTPATIENT)
Dept: SPORTS MEDICINE | Facility: CLINIC | Age: 35
End: 2020-06-12

## 2020-06-13 ENCOUNTER — PATIENT MESSAGE (OUTPATIENT)
Dept: GASTROENTEROLOGY | Facility: CLINIC | Age: 35
End: 2020-06-13

## 2020-06-13 DIAGNOSIS — K62.5 RECTAL BLEEDING: Primary | ICD-10-CM

## 2020-06-13 DIAGNOSIS — Z01.818 PREOP EXAMINATION: ICD-10-CM

## 2020-06-15 RX ORDER — SODIUM, POTASSIUM,MAG SULFATES 17.5-3.13G
1 SOLUTION, RECONSTITUTED, ORAL ORAL DAILY
Qty: 1 KIT | Refills: 0 | Status: SHIPPED | OUTPATIENT
Start: 2020-06-15 | End: 2020-06-17

## 2020-06-15 NOTE — TELEPHONE ENCOUNTER
Referring Physician: Dr. Brenner                             Date: 6/15/20    Reason for Referral: Rectal bleeding      Family History of:   Colon polyp: No  Relationship/Age of Onset:       Colon cancer: Yes  Relationship/Age of Onset: Maternal Uncle/ 50's      Patient with:   Hemoccults Done:       Iron deficient:   No      On Blood Thinner: No      Valvular heart disease/valve replacement: No      Anemia Present: No      On NSAID: No      Lung disease: No      Kidney disease: No      Hx of polyps: ?           Hx of colon cancer: ?      Previous colon evalations: Yes  When: 2 years ago   Where: [facility]  Pertinent symptoms:           Review of patient's allergies indicates: Nuts and sesame seeds        Patient was scheduled for colonoscopy on 7/21/20       with Dr. Brenner at [facility].       instructions were reviewed with patient.        Prep was sent to [facility]        SUPREP Instructions    You are scheduled for a colonoscopy with Dr. Brenner on 7/21/20 at [facility]  To ensure that your test is accurate and complete, you MUST follow these instructions listed below.  If you have any questions, please call our office at [phone].  Plan on being at the hospital for your procedure for 3-4 hours.    1.  Follow a CLEAR LIQUID DIET for the entire day before your scheduled colonoscopy.  This means no solid food the entire day starting when you wake.  You may have as much of the clear liquids as you want throughout the day.   CLEAR LIQUID DIET:   - Avoid Red, Orange, Purple, and/or Blue food coloring   - NO DAIRY   - You can have:  Coffee with sugar (no creamer), tea, water, soda, apple or white grape juice, chicken or beef broth/bouillon (no meat, noodles, or veggies), green/yellow popsicles, green/yellow Jell-O, lemonade.    2.  AT 5 pm the evening before your colonoscopy, POUR ONE (1) BOTTLE OF SUPREP INTO THE MIXING CONTAINER, PROVIDED INSIDE THE BOX.  ADD WATER TO  THE LINE ON THE CONTAINER AND MIX IT WELL.  DRINK THE ENTIRE CONTAINER AND THEN DRINK TWO (2) MORE CONTAINERS OF WATER OVER THE NEXT 1 HOUR.  This is sometimes easier to drink if this solution is cold, so you can mix the solution a few hours ahead of time and place in the refrigerator prior to drinking.  You have to drink the solution within 24 hours of mixing it.  Do NOT put this solution over ice.  It IS ok to drink with a straw.    3.  The endoscopy department will call you 2 days before your colonoscopy to tell you the exact time to arrive, AND to tell you the exact time to drink the 2nd portion of your prep (which will be FIVE HOURS BEFORE YOUR ARRIVAL TIME).  At this time given to you, POUR ONE (1) BOTTLE OF SUPREP INTO THE MIXING CONTAINER, PROVIDED INSIDE THE BOX.  ADD WATER TO THE LINE ON THE CONTAINER AND MIX IT WELL.  DRINK THE ENTIRE CONTAINER AND THEN DRINK TWO (2) MORE CONTAINERS OF WATER OVER THE NEXT 1 HOUR.  This is sometimes easier to drink if this solution is cold, so you can mix the solution a few hours ahead of time and place in the refrigerator prior to drinking.  You have to drink the solution within 24 hours of mixing it.  Do NOT put this solution over ice.  It IS ok to drink with a straw. Once this is complete, you may not have ANYTHING else by mouth!    4.  You must have someone with you to DRIVE YOU HOME since you will be receiving IV sedation for the colonoscopy.    5.  It is ok to take your heart, blood pressure, and seizure medications in the morning of your test with a SIP of water.  Hold other medications until after your procedure.  Do NOT have anything else to eat or drink the morning of your colonoscopy.  It is ok to brush your teeth.    6.  If you are on blood thinners THAT YOU HAVE BEEN INSTRUCTED TO HOLD BY YOUR DOCTOR FOR THIS PROCEDURE, then do NOT take this the morning of your colonoscopy.  Do NOT stop these medications on your own, they must be approved to be held by your  doctor.  Your colonoscopy can NOT be done if you are on these medications.  Examples of blood thinners include: Coumadin, Aggrenox, Plavix, Pradaxa, Reapro, Pletal, Xarelto, Ticagrelor, Brilinta, Eliquis, and high dose aspirin (325 mg).  You do not have to stop baby aspirin 81 mg.    7.  IF YOU ARE DIABETIC:  NO INSULIN OR ORAL MEDICATIONS THE MORNING OF THE COLONOSCOPY.  TAKE ONLY HALF THE DOSE OF YOUR INSULIN THE DAY BEFORE THE COLONOSCOPY.  DO NOT TAKE ANY ORAL DIABETIC MEDICATIONS THE DAY BEFORE THE COLONOSCOPY.  IF YOU ARE AN INSULIN DEPENDENT DIABETIC WITH UNSTABLE BLOOD SUGARS, NOTIFY YOUR PRIMARY CARE PHYSICIAN FOR INSTRUCTIONS.

## 2020-06-20 ENCOUNTER — HOSPITAL ENCOUNTER (OUTPATIENT)
Dept: RADIOLOGY | Facility: HOSPITAL | Age: 35
Discharge: HOME | End: 2020-06-20
Attending: PHYSICIAN ASSISTANT
Payer: MEDICAID

## 2020-06-20 DIAGNOSIS — M25.511 RIGHT SHOULDER PAIN, UNSPECIFIED CHRONICITY: ICD-10-CM

## 2020-06-20 PROCEDURE — 73221 MRI JOINT UPR EXTREM W/O DYE: CPT | Mod: TC,RT

## 2020-06-20 PROCEDURE — 73221 MRI JOINT UPR EXTREM W/O DYE: CPT | Mod: 26,RT,, | Performed by: RADIOLOGY

## 2020-06-20 PROCEDURE — 73221 MRI SHOULDER WITHOUT CONTRAST RIGHT: ICD-10-PCS | Mod: 26,RT,, | Performed by: RADIOLOGY

## 2020-06-22 ENCOUNTER — TELEPHONE (OUTPATIENT)
Dept: SPORTS MEDICINE | Facility: CLINIC | Age: 35
End: 2020-06-22

## 2020-06-22 ENCOUNTER — PATIENT MESSAGE (OUTPATIENT)
Dept: ORTHOPEDICS | Facility: CLINIC | Age: 35
End: 2020-06-22

## 2020-06-22 NOTE — TELEPHONE ENCOUNTER
"Contacted patient to discuss MRI results of shoulder.     Report shows:  "1. Postsurgical changes of supraspinatus tendon repair with implant augmentation.  Attenuated bursal surface fibers along the anterior repaired tendon is concerning for partial-thickness tear/graft dysfunction.  No full-thickness tear or significant retraction.  2. Infraspinatus tendinosis with small low-grade partial-thickness articular surface footprint tear.  3. Intact subscapularis tendon status post repair.  4. Status post labral debridement and biceps tenodesis.  5. Status post subacromial decompression.  6. Fluid within the expected location of the subacromial subdeltoid bursa suggesting bursitis."    She states that she is not very interested in injections as she does not believe this will give any relief.  She would like to discuss further with Dr. Antonio about possible follow up surgery or other treatments he may recommend.  I advised that I will message Dr. Antonio about these results.  Patient verbalized understanding.    Sudarshan Elizabeth PA-C  "

## 2020-06-22 NOTE — TELEPHONE ENCOUNTER
Spoke to patient to offer an appointment for when Dr. Antonio return to clinic. Patient stated that she only has Medicaid and will need to schedule her appointment at [facility].

## 2020-06-22 NOTE — TELEPHONE ENCOUNTER
Per Juliocesar Elizabeth to call the patient and let her know that Juliocesar will call her with the result of her MRI and their is not need to come into the office for her result. LVM on patient phone.

## 2020-07-01 ENCOUNTER — OFFICE VISIT (OUTPATIENT)
Dept: SPORTS MEDICINE | Facility: CLINIC | Age: 35
End: 2020-07-01
Payer: MEDICAID

## 2020-07-01 VITALS
HEIGHT: 63 IN | WEIGHT: 285 LBS | HEART RATE: 81 BPM | BODY MASS INDEX: 50.5 KG/M2 | SYSTOLIC BLOOD PRESSURE: 136 MMHG | DIASTOLIC BLOOD PRESSURE: 83 MMHG

## 2020-07-01 DIAGNOSIS — M25.511 RIGHT SHOULDER PAIN, UNSPECIFIED CHRONICITY: Primary | ICD-10-CM

## 2020-07-01 PROCEDURE — 99999 PR PBB SHADOW E&M-EST. PATIENT-LVL III: CPT | Mod: PBBFAC,,, | Performed by: ORTHOPAEDIC SURGERY

## 2020-07-01 PROCEDURE — 20610 DRAIN/INJ JOINT/BURSA W/O US: CPT | Mod: PBBFAC | Performed by: ORTHOPAEDIC SURGERY

## 2020-07-01 PROCEDURE — 20610 LARGE JOINT ASPIRATION/INJECTION: R SUBACROMIAL BURSA: ICD-10-PCS | Mod: S$PBB,RT,, | Performed by: ORTHOPAEDIC SURGERY

## 2020-07-01 PROCEDURE — 99214 PR OFFICE/OUTPT VISIT, EST, LEVL IV, 30-39 MIN: ICD-10-PCS | Mod: 25,S$PBB,, | Performed by: ORTHOPAEDIC SURGERY

## 2020-07-01 PROCEDURE — 99213 OFFICE O/P EST LOW 20 MIN: CPT | Mod: PBBFAC | Performed by: ORTHOPAEDIC SURGERY

## 2020-07-01 PROCEDURE — 99214 OFFICE O/P EST MOD 30 MIN: CPT | Mod: 25,S$PBB,, | Performed by: ORTHOPAEDIC SURGERY

## 2020-07-01 PROCEDURE — 99999 PR PBB SHADOW E&M-EST. PATIENT-LVL III: ICD-10-PCS | Mod: PBBFAC,,, | Performed by: ORTHOPAEDIC SURGERY

## 2020-07-01 RX ORDER — TRIAMCINOLONE ACETONIDE 40 MG/ML
40 INJECTION, SUSPENSION INTRA-ARTICULAR; INTRAMUSCULAR
Status: DISCONTINUED | OUTPATIENT
Start: 2020-07-01 | End: 2020-07-01 | Stop reason: HOSPADM

## 2020-07-01 RX ADMIN — TRIAMCINOLONE ACETONIDE 40 MG: 40 INJECTION, SUSPENSION INTRA-ARTICULAR; INTRAMUSCULAR at 01:07

## 2020-07-01 NOTE — PROGRESS NOTES
"CC: RIGHT shoulder pain     35 y.o. Female with a history of right shoulder pain who presents for evaluation. Patient is s/p right shoulder arthroscopic RCR and subpectoral biceps tenodesis with Dr. Antonio below. She reports approximately 4 weeks of right shoulder pain that has been increasing in intensity over the past couple days. She denies any new falls or trauma and does not recall any event or activity that she can relate to the pain. She states most of her pain is located in the anterior shoulder and notes a "crunching" sound when lifting her arm above her head. She states the pain is affecting her sleep. She has been doing HEP a few times a week and using her TENS unit for her symptoms with some relief. She reports associated weakness and decreased ROM.     She reports that the pain and weakness is worse with overhead activity. It also bothers her at night.    Is affecting ADLs.  Pain is 6/10 at it's worst.    DATE OF SURGERY:3/1/2019        PREOPERATIVE DIAGNOSES:   1. Right shoulder rotator cuff tear (subscapularis / supraspinatus)   2. Right shoulder biceps tendinopathy  3. Right shoulder labral tear     POSTOPERATIVE DIAGNOSES:   1. Right shoulder rotator cuff tear  (subscapularis / supraspinatus)  2. Right shoulder biceps tendinopathy  3. Right shoulder labral tear        PROCEDURE:   1. Right shoulder arthroscopic rotator cuff repair (subscapularis)  2. Placement of a right shoulder Regeneten implant on posterosuperior rotator cuff  3. Right shoulder arthroscopic biceps autotenodesis  4. Right shoulder arthroscopic subacromial decompression.  5. Right shoulder arthroscopic debridement labrum     Past Medical History:   Diagnosis Date    Allergy     Anxiety     BMI 50.0-59.9, adult     GERD (gastroesophageal reflux disease)     Hypoglycemic disorder     Obesity     Thyroid disease     Vertigo        Past Surgical History:   Procedure Laterality Date    ADENOIDECTOMY      ARTHROSCOPIC " CHONDROPLASTY OF KNEE JOINT Left 2019    Procedure: ARTHROSCOPY, KNEE, WITH CHONDROPLASTY;  Surgeon: Sravan Antonio MD;  Location: [facility] OR;  Service: Orthopedics;  Laterality: Left;    ARTHROSCOPIC DEBRIDEMENT OF SHOULDER Right 3/1/2019    Procedure: DEBRIDEMENT, SHOULDER, ARTHROSCOPIC;  Surgeon: Sravan Antonio MD;  Location: [facility] OR;  Service: Orthopedics;  Laterality: Right;  labral debridement    ARTHROSCOPIC REPAIR OF ROTATOR CUFF OF SHOULDER Right 3/1/2019    Procedure: REPAIR, ROTATOR CUFF, ARTHROSCOPIC;  Surgeon: Sravan Antonio MD;  Location: [facility] OR;  Service: Orthopedics;  Laterality: Right;  subscapularis  Regeneten implant    ARTHROSCOPY OF KNEE Left 2019    Procedure: ARTHROSCOPY, KNEE;  Surgeon: Sravan Antonio MD;  Location: [facility] OR;  Service: Orthopedics;  Laterality: Left;  regional w/o catheter / adductor     ARTHROSCOPY OF SHOULDER WITH DECOMPRESSION OF SUBACROMIAL SPACE Right 3/1/2019    Procedure: ARTHROSCOPY, SHOULDER, WITH SUBACROMIAL SPACE DECOMPRESSION;  Surgeon: Sravan Antonio MD;  Location: [facility] OR;  Service: Orthopedics;  Laterality: Right;     SECTION      DILATION AND CURETTAGE OF UTERUS      ESOPHAGOGASTRODUODENOSCOPY N/A 2020    Procedure: EGD (ESOPHAGOGASTRODUODENOSCOPY);  Surgeon: Erinn Brenner MD;  Location: [facility];  Service: Endoscopy;  Laterality: N/A;    GANGLION CYST EXCISION      RECONSTRUCTION OF LIGAMENT Left 2019    Procedure: RECONSTRUCTION, LIGAMENT;  Surgeon: Sravan Antonio MD;  Location: [facility] OR;  Service: Orthopedics;  Laterality: Left;  ACL    REPAIR OF MENISCUS OF KNEE Left 2019    Procedure: REPAIR, MENISCUS, KNEE;  Surgeon: Sravan Antonio MD;  Location: [facility] OR;  Service: Orthopedics;  Laterality: Left;  lateral    TONSILLECTOMY         Family History   Problem Relation Age of Onset    Diabetes Mother     Hypertension Mother     Depression Mother     Achalasia Mother      Hypertension Father     Heart disease Father     Allergies Father     Cancer Maternal Aunt     Diabetes Paternal Grandfather     Autism Brother     Allergies Son     Allergic rhinitis Neg Hx     Angioedema Neg Hx     Asthma Neg Hx     Atopy Neg Hx     Eczema Neg Hx     Immunodeficiency Neg Hx     Rhinitis Neg Hx     Urticaria Neg Hx          Current Outpatient Medications:     diphenhydrAMINE (BENADRYL) 50 MG capsule, Take 1 capsule (50 mg total) by mouth every 6 (six) hours as needed for Itching or Allergies. (Patient not taking: Reported on 4/1/2020), Disp: 20 capsule, Rfl: 0    EPINEPHrine (EPIPEN) 0.3 mg/0.3 mL AtIn, Inject 0.6 mLs (0.6 mg total) into the muscle as needed (for severe allergic reaction). (Patient not taking: Reported on 4/1/2020), Disp: 2 Device, Rfl: 0    escitalopram oxalate (LEXAPRO) 20 MG tablet, Take 1 tablet (20 mg total) by mouth once daily. (Patient taking differently: Take 30 mg by mouth once daily. ), Disp: 30 tablet, Rfl: 11    Lactobacillus rhamnosus GG (CULTURELLE) 10 billion cell capsule, Take 1 capsule by mouth daily as needed (stomach upset)., Disp: , Rfl:     levocetirizine (XYZAL) 5 MG tablet, Take 1 tablet (5 mg total) by mouth 2 (two) times daily., Disp: 90 tablet, Rfl: 3    lubiprostone (AMITIZA) 24 MCG Cap, Take 1 capsule (24 mcg total) by mouth 2 (two) times daily with meals. (Patient not taking: Reported on 4/1/2020), Disp: 180 capsule, Rfl: 3    omeprazole (PRILOSEC) 40 MG capsule, Take 40 mg by mouth once daily., Disp: , Rfl:     ondansetron (ZOFRAN-ODT) 8 MG TbDL, Take 8 mg by mouth every 6 (six) hours as needed (nausea)., Disp: , Rfl:     Review of patient's allergies indicates:   Allergen Reactions    Nuts [tree nut] Anaphylaxis     Griffin allergy, Peanut allergy    Sesame seed Anaphylaxis          REVIEW OF SYSTEMS:  Constitution: Negative. Negative for chills, fever and night sweats.   HENT: Negative for congestion and headaches.    Eyes:  "Negative for blurred vision, left vision loss and right vision loss.   Cardiovascular: Negative for chest pain and syncope.   Respiratory: Negative for cough and shortness of breath.    Endocrine: Negative for polydipsia, polyphagia and polyuria.   Hematologic/Lymphatic: Negative for bleeding problem. Does not bruise/bleed easily.   Skin: Negative for dry skin, itching and rash.   Musculoskeletal: Negative for falls.  Positive for right shoulder pain and muscle weakness.   Gastrointestinal: Negative for abdominal pain and bowel incontinence.   Genitourinary: Negative for bladder incontinence and nocturia.   Neurological: Negative for disturbances in coordination, loss of balance and seizures.   Psychiatric/Behavioral: Negative for depression. The patient does not have insomnia.    Allergic/Immunologic: Negative for hives and persistent infections.      PHYSICAL EXAMINATION:  Vitals:  /83   Pulse 81   Ht 5' 3" (1.6 m)   Wt 129.3 kg (285 lb)   BMI 50.49 kg/m²    General: The patient is alert and oriented x 3.  Mood is pleasant.  Observation of ears, eyes and nose reveal no gross abnormalities.  No labored breathing observed.  Gait is coordinated. Patient can toe walk and heel walk without difficulty.      RIGHT SHOULDER / UPPER EXTREMITY EXAM    OBSERVATION:     Swelling  none  Deformity  none   Discoloration  none   Scapular winging none   Scars   none  Atrophy  none    TENDERNESS / CREPITUS (T/C):          T/C      T/C   Clavicle   -/-  SUPRAspinatus    -/-     AC Jt.    -/-  INFRAspinatus  -/-    SC Jt.    -/-  Deltoid    -/-      G. Tuberosity  -/-  LH BICEP groove  +/-   Acromion:  -/-  Midline Neck   -/-     Scapular Spine -/-  Trapezium   -/-   SMA Scapula  -/-  GH jt. line - post  -/-     Scapulothoracic  +/-         ROM: (* = with pain)  Left shoulder   Right shoulder        AROM (PROM)   AROM (PROM)   FE    170° (175°)     100°* (130°*)     ER at 0°    60°  (65°)    40°*  (50°*)    IR (spine " level)   T10     L2    STRENGTH: (* = with pain) Left shoulder   Right shoulder   SCAPTION   5/5    4/5    IR    5/5    4/5   ER    5/5    4/5   BICEPS   5/5    4/5   Deltoid    5/5    4/5     SIGNS:  Painful side       NEER   +    ODENEENS  +    JACOBO   +    SPEEDS  neg     DROP ARM   +   BELLY PRESS neg   Superior escape none    LIFT-OFF  neg   X-Body ADD    neg    MOVING VALGUS neg        STABILITY TESTING    Left shoulder   Right shoulder    Translation     Anterior  up face     up face    Posterior  up face    up face    Sulcus   < 10mm    < 10 mm     Signs   Apprehension   neg      neg       Relocation   no change     no change      Jerk test  neg     neg    EXTREMITY NEURO-VASCULAR EXAM:    Sensation grossly intact to light touch all dermatomal regions.    DTR 2+ Biceps, Triceps, BR and Negative Donalds sign   Grossly intact motor function at Elbow, Wrist and Hand   Distal pulses radial and ulnar 2+, brisk cap refill, symmetric.      NECK:  Painless FROM and spinous processes non-tender. Negative Spurlings sign.      OTHER FINDINGS:  + scapular dyskinesia    XRAYS (6/5/2020):  FINDINGS:  Two views: No fracture dislocation bone destruction seen.      ASSESSMENT:  Right shoulder pain, possible biceps tendinitis, rotator cuff pathology      PLAN:      1. MRI to rule out rotator cuff pathology  2. Instructed patient to treat conservatively in the interim with activity modification, icing after activity, OTC Tylenol/NSAIDs, and TENS  3. Follow up in clinic to discuss MRI results    All questions were answered, patient will contact us for questions or concerns in the interim.

## 2020-07-01 NOTE — PROGRESS NOTES
"CC: RIGHT shoulder pain     35 y.o. Female returns to clinic to discuss MRI results and treatment options.    History of right shoulder pain who presents for evaluation. Patient is s/p right shoulder arthroscopic RCR and subpectoral biceps tenodesis with Dr. Antonio below. She reports since May 2020 increased right shoulder pain that has been increasing in intensity over the past couple days. She denies any new falls or trauma and does not recall any event or activity that she can relate to the pain. She states most of her pain is located in the anterior shoulder and notes a "crunching" sound when lifting her arm above her head. She states the pain is affecting her sleep. She has been doing HEP a few times a week and using her TENS unit for her symptoms with some relief. She reports associated weakness and decreased ROM.     She reports that the pain and weakness is worse with overhead activity. It also bothers her at night.    Is affecting ADLs.  Pain is 10/10 at it's worst.    DATE OF SURGERY:3/1/2019         PROCEDURE:   1. Right shoulder arthroscopic rotator cuff repair (subscapularis)  2. Placement of a right shoulder Regeneten implant on posterosuperior rotator cuff  3. Right shoulder arthroscopic biceps autotenodesis  4. Right shoulder arthroscopic subacromial decompression.  5. Right shoulder arthroscopic debridement labrum     Past Medical History:   Diagnosis Date    Allergy     Anxiety     BMI 50.0-59.9, adult     GERD (gastroesophageal reflux disease)     Hypoglycemic disorder     Obesity     Thyroid disease     Vertigo        Past Surgical History:   Procedure Laterality Date    ADENOIDECTOMY      ARTHROSCOPIC CHONDROPLASTY OF KNEE JOINT Left 9/6/2019    Procedure: ARTHROSCOPY, KNEE, WITH CHONDROPLASTY;  Surgeon: Sravan Antonio MD;  Location: [facility];  Service: Orthopedics;  Laterality: Left;    ARTHROSCOPIC DEBRIDEMENT OF SHOULDER Right 3/1/2019    Procedure: DEBRIDEMENT, SHOULDER, " ARTHROSCOPIC;  Surgeon: Sravan Antonio MD;  Location: [facility] OR;  Service: Orthopedics;  Laterality: Right;  labral debridement    ARTHROSCOPIC REPAIR OF ROTATOR CUFF OF SHOULDER Right 3/1/2019    Procedure: REPAIR, ROTATOR CUFF, ARTHROSCOPIC;  Surgeon: Sravan Antonio MD;  Location: [facility] OR;  Service: Orthopedics;  Laterality: Right;  subscapularis  Regeneten implant    ARTHROSCOPY OF KNEE Left 2019    Procedure: ARTHROSCOPY, KNEE;  Surgeon: Sravan Antonio MD;  Location: [facility] OR;  Service: Orthopedics;  Laterality: Left;  regional w/o catheter / adductor     ARTHROSCOPY OF SHOULDER WITH DECOMPRESSION OF SUBACROMIAL SPACE Right 3/1/2019    Procedure: ARTHROSCOPY, SHOULDER, WITH SUBACROMIAL SPACE DECOMPRESSION;  Surgeon: Sravan Antonio MD;  Location: [facility] OR;  Service: Orthopedics;  Laterality: Right;     SECTION      DILATION AND CURETTAGE OF UTERUS      ESOPHAGOGASTRODUODENOSCOPY N/A 2020    Procedure: EGD (ESOPHAGOGASTRODUODENOSCOPY);  Surgeon: Erinn Brenner MD;  Location: [facility];  Service: Endoscopy;  Laterality: N/A;    GANGLION CYST EXCISION      RECONSTRUCTION OF LIGAMENT Left 2019    Procedure: RECONSTRUCTION, LIGAMENT;  Surgeon: Sravan Antonio MD;  Location: [facility] OR;  Service: Orthopedics;  Laterality: Left;  ACL    REPAIR OF MENISCUS OF KNEE Left 2019    Procedure: REPAIR, MENISCUS, KNEE;  Surgeon: Sravan Antonio MD;  Location: [facility] OR;  Service: Orthopedics;  Laterality: Left;  lateral    TONSILLECTOMY         Family History   Problem Relation Age of Onset    Diabetes Mother     Hypertension Mother     Depression Mother     Achalasia Mother     Hypertension Father     Heart disease Father     Allergies Father     Cancer Maternal Aunt     Diabetes Paternal Grandfather     Autism Brother     Allergies Son     Allergic rhinitis Neg Hx     Angioedema Neg Hx     Asthma Neg Hx     Atopy Neg Hx     Eczema Neg Hx      Immunodeficiency Neg Hx     Rhinitis Neg Hx     Urticaria Neg Hx          Current Outpatient Medications:     diphenhydrAMINE (BENADRYL) 50 MG capsule, Take 1 capsule (50 mg total) by mouth every 6 (six) hours as needed for Itching or Allergies. (Patient not taking: Reported on 4/1/2020), Disp: 20 capsule, Rfl: 0    EPINEPHrine (EPIPEN) 0.3 mg/0.3 mL AtIn, Inject 0.6 mLs (0.6 mg total) into the muscle as needed (for severe allergic reaction). (Patient not taking: Reported on 4/1/2020), Disp: 2 Device, Rfl: 0    escitalopram oxalate (LEXAPRO) 20 MG tablet, Take 1 tablet (20 mg total) by mouth once daily. (Patient taking differently: Take 30 mg by mouth once daily. ), Disp: 30 tablet, Rfl: 11    Lactobacillus rhamnosus GG (CULTURELLE) 10 billion cell capsule, Take 1 capsule by mouth daily as needed (stomach upset)., Disp: , Rfl:     levocetirizine (XYZAL) 5 MG tablet, Take 1 tablet (5 mg total) by mouth 2 (two) times daily., Disp: 90 tablet, Rfl: 3    lubiprostone (AMITIZA) 24 MCG Cap, Take 1 capsule (24 mcg total) by mouth 2 (two) times daily with meals. (Patient not taking: Reported on 4/1/2020), Disp: 180 capsule, Rfl: 3    omeprazole (PRILOSEC) 40 MG capsule, Take 40 mg by mouth once daily., Disp: , Rfl:     ondansetron (ZOFRAN-ODT) 8 MG TbDL, Take 8 mg by mouth every 6 (six) hours as needed (nausea)., Disp: , Rfl:     Review of patient's allergies indicates:   Allergen Reactions    Nuts [tree nut] Anaphylaxis     Stuart allergy, Peanut allergy    Sesame seed Anaphylaxis          REVIEW OF SYSTEMS:  Constitution: Negative. Negative for chills, fever and night sweats.   HENT: Negative for congestion and headaches.    Eyes: Negative for blurred vision, left vision loss and right vision loss.   Cardiovascular: Negative for chest pain and syncope.   Respiratory: Negative for cough and shortness of breath.    Endocrine: Negative for polydipsia, polyphagia and polyuria.   Hematologic/Lymphatic: Negative for  "bleeding problem. Does not bruise/bleed easily.   Skin: Negative for dry skin, itching and rash.   Musculoskeletal: Negative for falls.  Positive for right shoulder pain and muscle weakness.   Gastrointestinal: Negative for abdominal pain and bowel incontinence.   Genitourinary: Negative for bladder incontinence and nocturia.   Neurological: Negative for disturbances in coordination, loss of balance and seizures.   Psychiatric/Behavioral: Negative for depression. The patient does not have insomnia.    Allergic/Immunologic: Negative for hives and persistent infections.      PHYSICAL EXAMINATION:  Vitals:  /83   Pulse 81   Ht 5' 3" (1.6 m)   Wt 129.3 kg (285 lb)   BMI 50.49 kg/m²    General: The patient is alert and oriented x 3.  Mood is pleasant.  Observation of ears, eyes and nose reveal no gross abnormalities.  No labored breathing observed.  Gait is coordinated. Patient can toe walk and heel walk without difficulty.      RIGHT SHOULDER / UPPER EXTREMITY EXAM    OBSERVATION:     Swelling  none  Deformity  none   Discoloration  none   Scapular winging none   Scars   none  Atrophy  none    TENDERNESS / CREPITUS (T/C):          T/C      T/C   Clavicle   -/-  SUPRAspinatus    -/-     AC Jt.    -/-  INFRAspinatus  -/-    SC Jt.    -/-  Deltoid    -/-      G. Tuberosity  -/-  LH BICEP groove  +/-   Acromion:  -/-  Midline Neck   -/-     Scapular Spine -/-  Trapezium   -/-   SMA Scapula  -/-  GH jt. line - post  -/-     Scapulothoracic  +/-         ROM: (* = with pain)  Left shoulder   Right shoulder        AROM (PROM)   AROM (PROM)   FE    170° (175°)     100°* (130°*)     ER at 0°    60°  (65°)    40°*  (50°*)    IR (spine level)   T10     L2    STRENGTH: (* = with pain) Left shoulder   Right shoulder   SCAPTION   5/5    4/5    IR    5/5    4/5   ER    5/5    4/5   BICEPS   5/5    4/5   Deltoid    5/5    4/5     SIGNS:  Painful side       NEER   +    ODENEENS  +    JACOBO   +    SPEEDS  neg     DROP " ARM   +   BELLY PRESS neg   Superior escape none    LIFT-OFF  neg   X-Body ADD    neg    MOVING VALGUS neg        STABILITY TESTING    Left shoulder   Right shoulder    Translation     Anterior  up face     up face    Posterior  up face    up face    Sulcus   < 10mm    < 10 mm     Signs   Apprehension   neg      neg       Relocation   no change     no change      Jerk test  neg     neg    EXTREMITY NEURO-VASCULAR EXAM:    Sensation grossly intact to light touch all dermatomal regions.    DTR 2+ Biceps, Triceps, BR and Negative Donalds sign   Grossly intact motor function at Elbow, Wrist and Hand   Distal pulses radial and ulnar 2+, brisk cap refill, symmetric.      NECK:  Painless FROM and spinous processes non-tender. Negative Spurlings sign.      OTHER FINDINGS:  + scapular dyskinesia    XRAYS (6/5/2020): No fracture dislocation bone destruction seen.    MRI (6/20/20):  1. Postsurgical changes of supraspinatus tendon repair with implant augmentation.  Attenuated bursal surface fibers along the anterior repaired tendon is concerning for partial-thickness tear/graft dysfunction.  No full-thickness tear or significant retraction.  2. Infraspinatus tendinosis with small low-grade partial-thickness articular surface footprint tear.  3. Intact subscapularis tendon status post repair.  4. Status post labral debridement and biceps tenodesis.  5. Status post subacromial decompression.  6. Fluid within the expected location of the subacromial subdeltoid bursa suggesting bursitis.    ASSESSMENT:  Right shoulder pain    PLAN:    1. Steroid injection today  2. Follow up as needed  3. If interested in RCR vs RC debridement can schedule for [facility] location

## 2020-07-01 NOTE — PROCEDURES
Large Joint Aspiration/Injection: R subacromial bursa    Date/Time: 7/1/2020 1:00 PM  Performed by: Sravan Antonio MD  Authorized by: Sravan Antonio MD     Consent Done?:  Yes (Verbal)  Indications:  Pain  Site marked: the procedure site was marked    Timeout: prior to procedure the correct patient, procedure, and site was verified    Prep: patient was prepped and draped in usual sterile fashion      Details:  Needle Size:  22 G  Ultrasonic Guidance for needle placement?: No    Approach:  Posterior  Location:  Shoulder  Site:  R subacromial bursa  Medications:  40 mg triamcinolone acetonide 40 mg/mL  Patient tolerance:  Patient tolerated the procedure well with no immediate complications

## 2020-07-08 NOTE — PROGRESS NOTES
Subjective:      Patient ID: June Reyes is a 35 y.o. female.    Chief Complaint: No chief complaint on file.      HPI  ([place])    Recently seen by Dr. Antonio on 7/1/20 for her right shoulder.     DATE OF SURGERY:3/1/2019         PROCEDURE:   1. Right shoulder arthroscopic rotator cuff repair (subscapularis)  2. Placement of a right shoulder Regeneten implant on posterosuperior rotator cuff  3. Right shoulder arthroscopic biceps autotenodesis  4. Right shoulder arthroscopic subacromial decompression.  5. Right shoulder arthroscopic debridement labrum     MRI (6/20/20):  1. Postsurgical changes of supraspinatus tendon repair with implant augmentation.  Attenuated bursal surface fibers along the anterior repaired tendon is concerning for partial-thickness tear/graft dysfunction.  No full-thickness tear or significant retraction.  2. Infraspinatus tendinosis with small low-grade partial-thickness articular surface footprint tear.  3. Intact subscapularis tendon status post repair.  4. Status post labral debridement and biceps tenodesis.  5. Status post subacromial decompression.  6. Fluid within the expected location of the subacromial subdeltoid bursa suggesting bursitis.    She had right shoulder injection (7/1/20) and he discussed possible surgery options as well.     She is here today complaining of chronic left shoulder pain that is worse with overhead motions, lifting, and at night. Left shoulder feels like right shoulder did prior to her surgery. She has seen some improvement in right shoulder pain with injection at last visit. Minimal relief with ice/heat to left shoulder. She rates her pain as a 3 on a scale of 1-10. Pain is aching and sharp in nature. She notes some pain in her shoulder blades and has been getting more frequent migraines lately.     She did PT for both shoulders 2-3 times a week for 6 weeks in January and February of this year. No left shoulder injections. No left shoulder surgery.  She takes OTC tylenol with minimal improvement.       Past Medical History:   Diagnosis Date    Allergy     Anxiety     BMI 50.0-59.9, adult     GERD (gastroesophageal reflux disease)     Hypoglycemic disorder     Obesity     Thyroid disease     Vertigo          Current Outpatient Medications:     diphenhydrAMINE (BENADRYL) 50 MG capsule, Take 1 capsule (50 mg total) by mouth every 6 (six) hours as needed for Itching or Allergies. (Patient not taking: Reported on 4/1/2020), Disp: 20 capsule, Rfl: 0    EPINEPHrine (EPIPEN) 0.3 mg/0.3 mL AtIn, Inject 0.6 mLs (0.6 mg total) into the muscle as needed (for severe allergic reaction). (Patient not taking: Reported on 4/1/2020), Disp: 2 Device, Rfl: 0    escitalopram oxalate (LEXAPRO) 20 MG tablet, Take 1 tablet (20 mg total) by mouth once daily. (Patient taking differently: Take 30 mg by mouth once daily. ), Disp: 30 tablet, Rfl: 11    Lactobacillus rhamnosus GG (CULTURELLE) 10 billion cell capsule, Take 1 capsule by mouth daily as needed (stomach upset)., Disp: , Rfl:     levocetirizine (XYZAL) 5 MG tablet, Take 1 tablet (5 mg total) by mouth 2 (two) times daily., Disp: 90 tablet, Rfl: 3    lubiprostone (AMITIZA) 24 MCG Cap, Take 1 capsule (24 mcg total) by mouth 2 (two) times daily with meals. (Patient not taking: Reported on 4/1/2020), Disp: 180 capsule, Rfl: 3    omeprazole (PRILOSEC) 40 MG capsule, Take 40 mg by mouth once daily., Disp: , Rfl:     ondansetron (ZOFRAN-ODT) 8 MG TbDL, Take 8 mg by mouth every 6 (six) hours as needed (nausea)., Disp: , Rfl:     Review of patient's allergies indicates:   Allergen Reactions    Nuts [tree nut] Anaphylaxis     [place] allergy, Peanut allergy    Sesame seed Anaphylaxis       Review of Systems   Constitution: Negative for chills, fever, night sweats and weight gain.   Gastrointestinal: Negative for bowel incontinence, nausea and vomiting.   Genitourinary: Negative for bladder incontinence.   Neurological:  Negative for disturbances in coordination and loss of balance.         Objective:        Resp 18   Wt 132 kg (291 lb)   BMI 51.55 kg/m²     Ortho/SPM Exam    Observation:    CERVICAL SPINE  No posterior cervical tenderness. Reasonable ROM of cervical spine with minimal pain. Tenderness in left trapezial region.     BILATERAL SHOULDERS:  No ecchymosis, edema, or erythema throughout the shoulder girdle.  No sternal, clavicular, or acromial deformities bilaterally.  No atrophy of the pectorals, deltoids, supraspinatus, infraspinatus, or biceps bilaterally.  No asymmetry of shoulders bilaterally.    ROM OF BOTH SHOULDERS:  Active flexion to 160° on left and 160° on right.   Active abduction to 160° on left and 160° on right.      Strength Testing of both UEs:  Deltoid - 5/5 on left and 5/5 on right  Biceps - 5/5 on left and 5/5 on right  Triceps - 5/5 on left and 5/5 on right  Wrist extension - 5/5 on left and 5/5 on right  Wrist flexion - 5/5 on left and 5/5 on right   - 5/5 on left and 5/5 on right    LEFT SHOULDER EXAM:  Tenderness:  Mild tenderness at the AC joint  No tenderness over the clavicle   No tenderness over biceps tendon or bicipital groove  No tenderness over subacromial space    Special Tests:  Empty can test - positive with 4/5 strength  Full can test - positive with 4/5 strength  Resisted internal rotation - negative  Resisted external rotation - negative    Neer's test - positive  Hawkin's-Dillon test - positive  Speed's test - negative  Yergason's test - negative    Sulcus sign - none  AP load and shift laxity - none    Neurovascular Exam Bilateral UEs:  2+ radial pulses BL  Sensation intact to light touch in the distal median, radial, and ulnar nerve distributions bilaterally.  Capillary refill intact <2 seconds in all digits bilaterally      XRAY INTERPRETATION:   X-rays of left shoulder dated 7/14/20 are personally reviewed and show no fracture, dislocation, or bony erosions.          Assessment:       Encounter Diagnoses   Name Primary?    Chronic left shoulder pain Yes    Shoulder impingement, left           Plan:       Diagnoses and all orders for this visit:    Chronic left shoulder pain  -     MRI Shoulder Without Contrast Left; Future    Shoulder impingement, left  -     MRI Shoulder Without Contrast Left; Future      Chronic left shoulder pain that is worse with overhead motions, lifting, and at night. Left shoulder feels like right shoulder did prior to her surgery. Left shoulder XRs look good. She has seen some improvement in right shoulder pain with injection at last visit.    Treatment options reviewed with patient along with above left shoulder xrays. Following plan made:     - She has failed PT (2-3 times per week x 6 weeks in January and February of this year), time, and medications.   - MRI of left shoulder to evaluate for rotator cuff tear.   - Continue prn OTC tylenol for now.   - Will put MRI results and further recommendations on [facility]. May consider left shoulder injection.     Follow up if symptoms worsen or fail to improve.

## 2020-07-10 ENCOUNTER — TELEPHONE (OUTPATIENT)
Dept: ORTHOPEDICS | Facility: CLINIC | Age: 35
End: 2020-07-10

## 2020-07-10 DIAGNOSIS — M25.512 CHRONIC LEFT SHOULDER PAIN: Primary | ICD-10-CM

## 2020-07-10 DIAGNOSIS — G89.29 CHRONIC LEFT SHOULDER PAIN: Primary | ICD-10-CM

## 2020-07-14 ENCOUNTER — OFFICE VISIT (OUTPATIENT)
Dept: ORTHOPEDICS | Facility: CLINIC | Age: 35
End: 2020-07-14
Payer: MEDICAID

## 2020-07-14 VITALS — BODY MASS INDEX: 51.55 KG/M2 | WEIGHT: 291 LBS | RESPIRATION RATE: 18 BRPM

## 2020-07-14 DIAGNOSIS — M25.512 CHRONIC LEFT SHOULDER PAIN: Primary | ICD-10-CM

## 2020-07-14 DIAGNOSIS — G89.29 CHRONIC LEFT SHOULDER PAIN: Primary | ICD-10-CM

## 2020-07-14 DIAGNOSIS — M25.812 SHOULDER IMPINGEMENT, LEFT: ICD-10-CM

## 2020-07-14 PROCEDURE — 99999 PR PBB SHADOW E&M-EST. PATIENT-LVL III: ICD-10-PCS | Mod: PBBFAC,,, | Performed by: PHYSICIAN ASSISTANT

## 2020-07-14 PROCEDURE — 99214 OFFICE O/P EST MOD 30 MIN: CPT | Mod: S$PBB,,, | Performed by: PHYSICIAN ASSISTANT

## 2020-07-14 PROCEDURE — 99999 PR PBB SHADOW E&M-EST. PATIENT-LVL III: CPT | Mod: PBBFAC,,, | Performed by: PHYSICIAN ASSISTANT

## 2020-07-14 PROCEDURE — 99214 PR OFFICE/OUTPT VISIT, EST, LEVL IV, 30-39 MIN: ICD-10-PCS | Mod: S$PBB,,, | Performed by: PHYSICIAN ASSISTANT

## 2020-07-14 PROCEDURE — 99213 OFFICE O/P EST LOW 20 MIN: CPT | Mod: PBBFAC,PN | Performed by: PHYSICIAN ASSISTANT

## 2020-07-14 NOTE — PATIENT INSTRUCTIONS
It was nice to meet you today! I am sorry that you are hurting so much.     I want to get a better look at your rotator cuff in the left shoulder with an MRI. I will put the results and further recommendations on [facility].     Continue over the counter tylenol to help with pain/inflammation. Take as directed with food.     Please stay in touch and call me if you need anything. You can also send me a message in [facility].     Bobbi   [phone]

## 2020-07-15 ENCOUNTER — TELEPHONE (OUTPATIENT)
Dept: GASTROENTEROLOGY | Facility: CLINIC | Age: 35
End: 2020-07-15

## 2020-07-15 NOTE — TELEPHONE ENCOUNTER
----- Message from Amber Ramirez sent at 7/15/2020  9:32 AM CDT -----  Regarding: advice  Type:  Needs Medical Advice    Who Called: pt  Advice Regarding: upcoming testing, instructions  Would the patient rather a call back or a response via [facility]? call  Best Call Back Number: [phone]  Additional Information: n/a

## 2020-07-18 ENCOUNTER — LAB VISIT (OUTPATIENT)
Dept: FAMILY MEDICINE | Facility: CLINIC | Age: 35
End: 2020-07-18
Payer: MEDICAID

## 2020-07-18 DIAGNOSIS — Z01.818 PREOP EXAMINATION: ICD-10-CM

## 2020-07-18 PROCEDURE — U0003 INFECTIOUS AGENT DETECTION BY NUCLEIC ACID (DNA OR RNA); SEVERE ACUTE RESPIRATORY SYNDROME CORONAVIRUS 2 (SARS-COV-2) (CORONAVIRUS DISEASE [COVID-19]), AMPLIFIED PROBE TECHNIQUE, MAKING USE OF HIGH THROUGHPUT TECHNOLOGIES AS DESCRIBED BY CMS-2020-01-R: HCPCS

## 2020-07-19 LAB — SARS-COV-2 RNA RESP QL NAA+PROBE: NOT DETECTED

## 2020-07-21 PROBLEM — K62.5 RECTAL BLEEDING: Status: ACTIVE | Noted: 2020-07-21

## 2020-07-23 ENCOUNTER — PATIENT MESSAGE (OUTPATIENT)
Dept: ORTHOPEDICS | Facility: CLINIC | Age: 35
End: 2020-07-23

## 2020-07-23 NOTE — TELEPHONE ENCOUNTER
Patient MRI has not been approve through medicaid yet. Patient will call back and speak with Radha Stevens tomorrow. Vf/ma

## 2020-09-17 ENCOUNTER — OFFICE VISIT (OUTPATIENT)
Dept: SPORTS MEDICINE | Facility: CLINIC | Age: 35
End: 2020-09-17
Payer: MEDICAID

## 2020-09-17 VITALS
WEIGHT: 293 LBS | SYSTOLIC BLOOD PRESSURE: 140 MMHG | BODY MASS INDEX: 53.92 KG/M2 | HEART RATE: 96 BPM | DIASTOLIC BLOOD PRESSURE: 78 MMHG | HEIGHT: 62 IN

## 2020-09-17 DIAGNOSIS — M75.101 NONTRAUMATIC TEAR OF RIGHT ROTATOR CUFF: ICD-10-CM

## 2020-09-17 DIAGNOSIS — M25.512 LEFT SHOULDER PAIN, UNSPECIFIED CHRONICITY: ICD-10-CM

## 2020-09-17 DIAGNOSIS — M25.511 RIGHT SHOULDER PAIN, UNSPECIFIED CHRONICITY: Primary | ICD-10-CM

## 2020-09-17 DIAGNOSIS — Z01.818 PRE-OP TESTING: ICD-10-CM

## 2020-09-17 PROCEDURE — 99999 PR PBB SHADOW E&M-EST. PATIENT-LVL III: ICD-10-PCS | Mod: PBBFAC,,, | Performed by: ORTHOPAEDIC SURGERY

## 2020-09-17 PROCEDURE — 99213 OFFICE O/P EST LOW 20 MIN: CPT | Mod: PBBFAC,25 | Performed by: ORTHOPAEDIC SURGERY

## 2020-09-17 PROCEDURE — 20610 DRAIN/INJ JOINT/BURSA W/O US: CPT | Mod: PBBFAC | Performed by: ORTHOPAEDIC SURGERY

## 2020-09-17 PROCEDURE — 99999 PR PBB SHADOW E&M-EST. PATIENT-LVL III: CPT | Mod: PBBFAC,,, | Performed by: ORTHOPAEDIC SURGERY

## 2020-09-17 PROCEDURE — 20610 LARGE JOINT ASPIRATION/INJECTION: L SUBACROMIAL BURSA: ICD-10-PCS | Mod: S$PBB,LT,, | Performed by: ORTHOPAEDIC SURGERY

## 2020-09-17 PROCEDURE — 99214 OFFICE O/P EST MOD 30 MIN: CPT | Mod: 25,S$PBB,, | Performed by: ORTHOPAEDIC SURGERY

## 2020-09-17 PROCEDURE — 99214 PR OFFICE/OUTPT VISIT, EST, LEVL IV, 30-39 MIN: ICD-10-PCS | Mod: 25,S$PBB,, | Performed by: ORTHOPAEDIC SURGERY

## 2020-09-17 RX ORDER — TRIAMCINOLONE ACETONIDE 40 MG/ML
60 INJECTION, SUSPENSION INTRA-ARTICULAR; INTRAMUSCULAR
Status: DISCONTINUED | OUTPATIENT
Start: 2020-09-17 | End: 2020-09-17 | Stop reason: HOSPADM

## 2020-09-17 RX ORDER — SUMATRIPTAN SUCCINATE 100 MG/1
100 TABLET ORAL
COMMUNITY
Start: 2020-09-09 | End: 2020-10-14 | Stop reason: DRUGHIGH

## 2020-09-17 RX ADMIN — TRIAMCINOLONE ACETONIDE 60 MG: 40 INJECTION, SUSPENSION INTRA-ARTICULAR; INTRAMUSCULAR at 02:09

## 2020-09-17 NOTE — PROCEDURES
Large Joint Aspiration/Injection: L subacromial bursa    Date/Time: 9/17/2020 2:15 PM  Performed by: Sravan Antonio MD  Authorized by: Sravan Antonio MD     Consent Done?:  Yes (Verbal)  Indications:  Pain  Site marked: the procedure site was marked    Timeout: prior to procedure the correct patient, procedure, and site was verified    Prep: patient was prepped and draped in usual sterile fashion      Details:  Needle Size:  22 G  Ultrasonic Guidance for needle placement?: No    Approach:  Posterior  Location:  Shoulder  Site:  L subacromial bursa  Medications:  60 mg triamcinolone acetonide 40 mg/mL  Patient tolerance:  Patient tolerated the procedure well with no immediate complications

## 2020-09-17 NOTE — PROGRESS NOTES
"CC: RIGHT shoulder pain     35 y.o. Female returns to clinic for evaluation of her right shoulder.  She recevied a steroid injection at her last visit on 7/1/2020 for her right shoulder.    History of right shoulder pain who presents for evaluation. Patient is s/p right shoulder arthroscopic RCR and subpectoral biceps tenodesis with Dr. Antonio below. She reports since May 2020 increased right shoulder pain that has been increasing in intensity over the past couple days. She denies any new falls or trauma and does not recall any event or activity that she can relate to the pain. She states most of her pain is located in the anterior shoulder and notes a "crunching" sound when lifting her arm above her head. She states the pain is affecting her sleep. She has been doing HEP a few times a week and using her TENS unit for her symptoms with some relief. She reports associated weakness and decreased ROM.     She reports that the pain and weakness is worse with overhead activity. It also bothers her at night.    Is affecting ADLs.  Pain is 7/10 at it's worst.    DATE OF SURGERY:3/1/2019         PROCEDURE:   1. Right shoulder arthroscopic rotator cuff repair (subscapularis)  2. Placement of a right shoulder Regeneten implant on posterosuperior rotator cuff  3. Right shoulder arthroscopic biceps autotenodesis  4. Right shoulder arthroscopic subacromial decompression.  5. Right shoulder arthroscopic debridement labrum     Past Medical History:   Diagnosis Date    Allergy     Anxiety     BMI 50.0-59.9, adult     GERD (gastroesophageal reflux disease)     Hypoglycemic disorder     Obesity     Thyroid disease     Vertigo        Past Surgical History:   Procedure Laterality Date    ADENOIDECTOMY      ARTHROSCOPIC CHONDROPLASTY OF KNEE JOINT Left 9/6/2019    Procedure: ARTHROSCOPY, KNEE, WITH CHONDROPLASTY;  Surgeon: Sravan Antonio MD;  Location: [facility];  Service: Orthopedics;  Laterality: Left;    " ARTHROSCOPIC DEBRIDEMENT OF SHOULDER Right 3/1/2019    Procedure: DEBRIDEMENT, SHOULDER, ARTHROSCOPIC;  Surgeon: Sravan Antonio MD;  Location: [facility] OR;  Service: Orthopedics;  Laterality: Right;  labral debridement    ARTHROSCOPIC REPAIR OF ROTATOR CUFF OF SHOULDER Right 3/1/2019    Procedure: REPAIR, ROTATOR CUFF, ARTHROSCOPIC;  Surgeon: Sravan Antonio MD;  Location: [facility] OR;  Service: Orthopedics;  Laterality: Right;  subscapularis  Regeneten implant    ARTHROSCOPY OF KNEE Left 2019    Procedure: ARTHROSCOPY, KNEE;  Surgeon: Sravan Antonio MD;  Location: [facility] OR;  Service: Orthopedics;  Laterality: Left;  regional w/o catheter / adductor     ARTHROSCOPY OF SHOULDER WITH DECOMPRESSION OF SUBACROMIAL SPACE Right 3/1/2019    Procedure: ARTHROSCOPY, SHOULDER, WITH SUBACROMIAL SPACE DECOMPRESSION;  Surgeon: Sravan Antonio MD;  Location: [facility] OR;  Service: Orthopedics;  Laterality: Right;     SECTION      COLONOSCOPY      COLONOSCOPY N/A 2020    Procedure: COLONOSCOPY;  Surgeon: Erinn Brenner MD;  Location: [facility] ENDO;  Service: Endoscopy;  Laterality: N/A;    DILATION AND CURETTAGE OF UTERUS      ESOPHAGOGASTRODUODENOSCOPY N/A 2020    Procedure: EGD (ESOPHAGOGASTRODUODENOSCOPY);  Surgeon: Erinn Brenner MD;  Location: [facility] END[facility];  Service: Endoscopy;  Laterality: N/A;    GANGLION CYST EXCISION      RECONSTRUCTION OF LIGAMENT Left 2019    Procedure: RECONSTRUCTION, LIGAMENT;  Surgeon: Sravan Antonio MD;  Location: [facility] OR;  Service: Orthopedics;  Laterality: Left;  ACL    REPAIR OF MENISCUS OF KNEE Left 2019    Procedure: REPAIR, MENISCUS, KNEE;  Surgeon: Sravan Antonio MD;  Location: [facility] OR;  Service: Orthopedics;  Laterality: Left;  lateral    TONSILLECTOMY         Family History   Problem Relation Age of Onset    Diabetes Mother     Hypertension Mother     Depression Mother     Achalasia Mother     Hypertension Father      Heart disease Father     Allergies Father     Cancer Maternal Aunt     Diabetes Paternal Grandfather     Autism Brother     Allergies Son     Allergic rhinitis Neg Hx     Angioedema Neg Hx     Asthma Neg Hx     Atopy Neg Hx     Eczema Neg Hx     Immunodeficiency Neg Hx     Rhinitis Neg Hx     Urticaria Neg Hx          Current Outpatient Medications:     diphenhydrAMINE (BENADRYL) 50 MG capsule, Take 1 capsule (50 mg total) by mouth every 6 (six) hours as needed for Itching or Allergies., Disp: 20 capsule, Rfl: 0    EPINEPHrine (EPIPEN) 0.3 mg/0.3 mL AtIn, Inject 0.6 mLs (0.6 mg total) into the muscle as needed (for severe allergic reaction)., Disp: 2 Device, Rfl: 0    Lactobacillus rhamnosus GG (CULTURELLE) 10 billion cell capsule, Take 1 capsule by mouth daily as needed (stomach upset)., Disp: , Rfl:     levocetirizine (XYZAL) 5 MG tablet, Take 1 tablet (5 mg total) by mouth 2 (two) times daily., Disp: 90 tablet, Rfl: 3    lubiprostone (AMITIZA) 24 MCG Cap, Take 1 capsule (24 mcg total) by mouth 2 (two) times daily with meals., Disp: 180 capsule, Rfl: 3    omeprazole (PRILOSEC) 40 MG capsule, Take 40 mg by mouth once daily., Disp: , Rfl:     ondansetron (ZOFRAN-ODT) 8 MG TbDL, Take 8 mg by mouth every 6 (six) hours as needed (nausea)., Disp: , Rfl:     sumatriptan (IMITREX) 50 MG tablet, Take 50 mg by mouth once daily., Disp: , Rfl:     escitalopram oxalate (LEXAPRO) 20 MG tablet, Take 1 tablet (20 mg total) by mouth once daily. (Patient taking differently: Take 30 mg by mouth once daily. ), Disp: 30 tablet, Rfl: 11  No current facility-administered medications for this visit.     Facility-Administered Medications Ordered in Other Visits:     0.9%  NaCl infusion, , Intravenous, Continuous, Erinn Brenner MD    sodium chloride 0.9% flush 10 mL, 10 mL, Intravenous, PRN, Erinn Brenner MD    Review of patient's allergies indicates:   Allergen Reactions    Nuts [tree nut] Anaphylaxis  "    [place] allergy, Peanut allergy    Sesame seed Anaphylaxis          REVIEW OF SYSTEMS:  Constitution: Negative. Negative for chills, fever and night sweats.   HENT: Negative for congestion and headaches.    Eyes: Negative for blurred vision, left vision loss and right vision loss.   Cardiovascular: Negative for chest pain and syncope.   Respiratory: Negative for cough and shortness of breath.    Endocrine: Negative for polydipsia, polyphagia and polyuria.   Hematologic/Lymphatic: Negative for bleeding problem. Does not bruise/bleed easily.   Skin: Negative for dry skin, itching and rash.   Musculoskeletal: Negative for falls.  Positive for right shoulder pain and muscle weakness.   Gastrointestinal: Negative for abdominal pain and bowel incontinence.   Genitourinary: Negative for bladder incontinence and nocturia.   Neurological: Negative for disturbances in coordination, loss of balance and seizures.   Psychiatric/Behavioral: Negative for depression. The patient does not have insomnia.    Allergic/Immunologic: Negative for hives and persistent infections.      PHYSICAL EXAMINATION:  Vitals:  BP (!) 140/78   Pulse 96   Ht 5' 2" (1.575 m)   Wt (!) 136.3 kg (300 lb 6.4 oz)   BMI 54.94 kg/m²    General: The patient is alert and oriented x 3.  Mood is pleasant.  Observation of ears, eyes and nose reveal no gross abnormalities.  No labored breathing observed.  Gait is coordinated. Patient can toe walk and heel walk without difficulty.      RIGHT SHOULDER / UPPER EXTREMITY EXAM    OBSERVATION:     Swelling  none  Deformity  none   Discoloration  none   Scapular winging none   Scars   none  Atrophy  none    TENDERNESS / CREPITUS (T/C):          T/C      T/C   Clavicle   -/-  SUPRAspinatus    -/-     AC Jt.    -/-  INFRAspinatus  -/-    SC Jt.    -/-  Deltoid    -/-      G. Tuberosity  -/-  LH BICEP groove  +/-   Acromion:  -/-  Midline Neck   -/-     Scapular Spine -/-  Trapezium   -/-   SMA Scapula  -/-  GH jt. " line - post  -/-     Scapulothoracic  +/-         ROM: (* = with pain)  Left shoulder   Right shoulder        AROM (PROM)   AROM (PROM)   FE    170° (175°)     100°* (130°*)     ER at 0°    60°  (65°)    40°*  (50°*)    IR (spine level)   T10     L2    STRENGTH: (* = with pain) Left shoulder   Right shoulder   SCAPTION   5/5    4/5    IR    5/5    4/5   ER    5/5    4/5   BICEPS   5/5    4/5   Deltoid    5/5    4/5     SIGNS:  Painful side       NEER   +    ODENEENS  +    JACOBO   +    SPEEDS  neg     DROP ARM   +   BELLY PRESS neg   Superior escape none    LIFT-OFF  neg   X-Body ADD    neg    MOVING VALGUS neg        STABILITY TESTING    Left shoulder   Right shoulder    Translation     Anterior  up face     up face    Posterior  up face    up face    Sulcus   < 10mm    < 10 mm     Signs   Apprehension   neg      neg       Relocation   no change     no change      Jerk test  neg     neg    EXTREMITY NEURO-VASCULAR EXAM:    Sensation grossly intact to light touch all dermatomal regions.    DTR 2+ Biceps, Triceps, BR and Negative Donalds sign   Grossly intact motor function at Elbow, Wrist and Hand   Distal pulses radial and ulnar 2+, brisk cap refill, symmetric.      NECK:  Painless FROM and spinous processes non-tender. Negative Spurlings sign.      OTHER FINDINGS:  + scapular dyskinesia    XRAYS (6/5/2020): No fracture dislocation bone destruction seen.    MRI (6/20/20):  1. Postsurgical changes of supraspinatus tendon repair with implant augmentation.  Attenuated bursal surface fibers along the anterior repaired tendon is concerning for partial-thickness tear/graft dysfunction.  No full-thickness tear or significant retraction.  2. Infraspinatus tendinosis with small low-grade partial-thickness articular surface footprint tear.  3. Intact subscapularis tendon status post repair.  4. Status post labral debridement and biceps tenodesis.  5. Status post subacromial decompression.  6. Fluid within the  expected location of the subacromial subdeltoid bursa suggesting bursitis.    ASSESSMENT:    Right shoulder pain, partial thickness RC tear  Left shoulder pain    PLAN:    1. Left shoulder steroid injection today   2. Plan for Right shoulder RCR.  Will need PCP clearance.  Will need Covid test 72 hours   3. Voltaren gel    Treatment options were discussed with the patient about shoulder.  I reviewed the MRI images with her and what this means for her shoulder.    We discussed both non-operative and operative options for her shoulder and the risks and benefits of each. Time was given for questions to be asked and all concerns were answered.    She would like to go forward with surgery for her shoulder which I think is reasonable.  All specific risks and benefits were reviewed.    These risks include but are not limited to: bleeding, infection, scarring, re-tear of repair, irreparability of the tear, damage to neurovascular structures, damage to cartilage, stiffness, blood clots, pulmonary embolism, swelling, compartment syndrome, need for further surgery, and the risks of anesthesia.      She verbalized her understanding of these risks and wished to proceed with surgery.    Time was spent face-to-face with the patient during this encounter on counseling about treatment options including surgery and coordination of her care for preoperative visits, surgery and post-operative rehab.     The operative plan will be:    right   1. Arthroscopic rotator cuff repair versus debridement  2. Arthroscopic subacromial decompression  3. Arthroscopic distal clavicle excision    Patient will  need medical clearance prior to the pre-operative appointment.    All questions were answered, patient will contact us for questions or concerns in the interim.

## 2020-09-21 ENCOUNTER — TELEPHONE (OUTPATIENT)
Dept: SPORTS MEDICINE | Facility: CLINIC | Age: 35
End: 2020-09-21

## 2020-09-21 DIAGNOSIS — M75.101 NONTRAUMATIC TEAR OF RIGHT ROTATOR CUFF, UNSPECIFIED TEAR EXTENT: Primary | ICD-10-CM

## 2020-09-21 NOTE — TELEPHONE ENCOUNTER
Right shoulder RCR 10/19    ----- Message from Janette Parisi MA sent at 9/21/2020  9:36 AM CDT -----  Patient will do PT at [place]    Pre op - 10/13/20    Date of surgery - 10/16/20

## 2020-09-21 NOTE — TELEPHONE ENCOUNTER
Patient would like to switch her post op PT to:    [facility]   [address], [address]   [place] [address]   [facility]     Phone Number   Office: [phone]   Fax: [phone]

## 2020-09-23 ENCOUNTER — OUTSIDE PLACE OF SERVICE (OUTPATIENT)
Dept: CARDIOLOGY | Facility: CLINIC | Age: 35
End: 2020-09-23
Payer: MEDICAID

## 2020-09-23 PROCEDURE — 93010 PR ELECTROCARDIOGRAM REPORT: ICD-10-PCS | Mod: ,,, | Performed by: INTERNAL MEDICINE

## 2020-09-23 PROCEDURE — 93010 ELECTROCARDIOGRAM REPORT: CPT | Mod: ,,, | Performed by: INTERNAL MEDICINE

## 2020-10-13 ENCOUNTER — OFFICE VISIT (OUTPATIENT)
Dept: SPORTS MEDICINE | Facility: CLINIC | Age: 35
End: 2020-10-13
Payer: MEDICAID

## 2020-10-13 ENCOUNTER — LAB VISIT (OUTPATIENT)
Dept: SPORTS MEDICINE | Facility: CLINIC | Age: 35
End: 2020-10-13
Payer: MEDICAID

## 2020-10-13 VITALS — HEIGHT: 62 IN | WEIGHT: 293 LBS | BODY MASS INDEX: 53.92 KG/M2

## 2020-10-13 DIAGNOSIS — M75.101 NONTRAUMATIC TEAR OF RIGHT ROTATOR CUFF, UNSPECIFIED TEAR EXTENT: Primary | ICD-10-CM

## 2020-10-13 DIAGNOSIS — Z01.818 PRE-OP TESTING: ICD-10-CM

## 2020-10-13 PROCEDURE — 99213 OFFICE O/P EST LOW 20 MIN: CPT | Mod: PBBFAC | Performed by: PHYSICIAN ASSISTANT

## 2020-10-13 PROCEDURE — 99999 PR PBB SHADOW E&M-EST. PATIENT-LVL III: CPT | Mod: PBBFAC,,, | Performed by: PHYSICIAN ASSISTANT

## 2020-10-13 PROCEDURE — 99999 PR PBB SHADOW E&M-EST. PATIENT-LVL III: ICD-10-PCS | Mod: PBBFAC,,, | Performed by: PHYSICIAN ASSISTANT

## 2020-10-13 PROCEDURE — 99499 NO LOS: ICD-10-PCS | Mod: S$PBB,,, | Performed by: PHYSICIAN ASSISTANT

## 2020-10-13 PROCEDURE — U0003 INFECTIOUS AGENT DETECTION BY NUCLEIC ACID (DNA OR RNA); SEVERE ACUTE RESPIRATORY SYNDROME CORONAVIRUS 2 (SARS-COV-2) (CORONAVIRUS DISEASE [COVID-19]), AMPLIFIED PROBE TECHNIQUE, MAKING USE OF HIGH THROUGHPUT TECHNOLOGIES AS DESCRIBED BY CMS-2020-01-R: HCPCS

## 2020-10-13 PROCEDURE — 99499 UNLISTED E&M SERVICE: CPT | Mod: S$PBB,,, | Performed by: PHYSICIAN ASSISTANT

## 2020-10-13 RX ORDER — OXYCODONE AND ACETAMINOPHEN 10; 325 MG/1; MG/1
1 TABLET ORAL EVERY 6 HOURS PRN
Qty: 28 TABLET | Refills: 0 | Status: SHIPPED | OUTPATIENT
Start: 2020-10-16 | End: 2021-04-22

## 2020-10-13 RX ORDER — ONDANSETRON 4 MG/1
4 TABLET, FILM COATED ORAL EVERY 12 HOURS PRN
Qty: 14 TABLET | Refills: 0 | Status: SHIPPED | OUTPATIENT
Start: 2020-10-15 | End: 2021-04-22

## 2020-10-13 RX ORDER — ASPIRIN 325 MG
325 TABLET ORAL DAILY
Qty: 21 TABLET | Refills: 0 | Status: SHIPPED | OUTPATIENT
Start: 2020-10-15 | End: 2021-04-22

## 2020-10-13 RX ORDER — TRAMADOL HYDROCHLORIDE 50 MG/1
50 TABLET ORAL EVERY 6 HOURS PRN
Qty: 28 TABLET | Refills: 0 | Status: SHIPPED | OUTPATIENT
Start: 2020-10-15 | End: 2021-04-22

## 2020-10-13 RX ORDER — SODIUM CHLORIDE 9 MG/ML
INJECTION, SOLUTION INTRAVENOUS CONTINUOUS
Status: CANCELLED | OUTPATIENT
Start: 2020-10-13

## 2020-10-13 NOTE — H&P
June Reyes  is here for a completion of her perioperative paperwork. she  Is scheduled to undergo:    right   1. Arthroscopic rotator cuff repair versus debridement  2. Arthroscopic subacromial decompression  3. Arthroscopic distal clavicle excision     on 10/16/2020.      She is a healthy individual but does need clearance for this procedure.    Per patient's PCP Dr. Baumann, patient is cleared for surgery     PAST MEDICAL HISTORY:   Past Medical History:   Diagnosis Date    Allergy     Anxiety     BMI 50.0-59.9, adult     GERD (gastroesophageal reflux disease)     Hypoglycemic disorder     Obesity     Thyroid disease     Vertigo      PAST SURGICAL HISTORY:   Past Surgical History:   Procedure Laterality Date    ADENOIDECTOMY      ARTHROSCOPIC CHONDROPLASTY OF KNEE JOINT Left 2019    Procedure: ARTHROSCOPY, KNEE, WITH CHONDROPLASTY;  Surgeon: Sravan Antonio MD;  Location: [facility] OR;  Service: Orthopedics;  Laterality: Left;    ARTHROSCOPIC DEBRIDEMENT OF SHOULDER Right 3/1/2019    Procedure: DEBRIDEMENT, SHOULDER, ARTHROSCOPIC;  Surgeon: Sravan Antonio MD;  Location: [facility] OR;  Service: Orthopedics;  Laterality: Right;  labral debridement    ARTHROSCOPIC REPAIR OF ROTATOR CUFF OF SHOULDER Right 3/1/2019    Procedure: REPAIR, ROTATOR CUFF, ARTHROSCOPIC;  Surgeon: Sravan Antonio MD;  Location: [facility] OR;  Service: Orthopedics;  Laterality: Right;  subscapularis  Regeneten implant    ARTHROSCOPY OF KNEE Left 2019    Procedure: ARTHROSCOPY, KNEE;  Surgeon: Sravan Antonio MD;  Location: [facility] OR;  Service: Orthopedics;  Laterality: Left;  regional w/o catheter / adductor     ARTHROSCOPY OF SHOULDER WITH DECOMPRESSION OF SUBACROMIAL SPACE Right 3/1/2019    Procedure: ARTHROSCOPY, SHOULDER, WITH SUBACROMIAL SPACE DECOMPRESSION;  Surgeon: Sravan Antonio MD;  Location: [facility] OR;  Service: Orthopedics;  Laterality: Right;     SECTION      COLONOSCOPY      COLONOSCOPY N/A  7/21/2020    Procedure: COLONOSCOPY;  Surgeon: Erinn Brenner MD;  Location: [facility] ENDO;  Service: Endoscopy;  Laterality: N/A;    DILATION AND CURETTAGE OF UTERUS      ESOPHAGOGASTRODUODENOSCOPY N/A 1/2/2020    Procedure: EGD (ESOPHAGOGASTRODUODENOSCOPY);  Surgeon: Erinn Brenner MD;  Location: [facility] ENDO;  Service: Endoscopy;  Laterality: N/A;    GANGLION CYST EXCISION      RECONSTRUCTION OF LIGAMENT Left 9/6/2019    Procedure: RECONSTRUCTION, LIGAMENT;  Surgeon: Sravan Antonio MD;  Location: [facility] OR;  Service: Orthopedics;  Laterality: Left;  ACL    REPAIR OF MENISCUS OF KNEE Left 9/6/2019    Procedure: REPAIR, MENISCUS, KNEE;  Surgeon: Sravan Antonio MD;  Location: [facility] OR;  Service: Orthopedics;  Laterality: Left;  lateral    TONSILLECTOMY       FAMILY HISTORY:   Family History   Problem Relation Age of Onset    Diabetes Mother     Hypertension Mother     Depression Mother     Achalasia Mother     Hypertension Father     Heart disease Father     Allergies Father     Cancer Maternal Aunt     Diabetes Paternal Grandfather     Autism Brother     Allergies Son     Allergic rhinitis Neg Hx     Angioedema Neg Hx     Asthma Neg Hx     Atopy Neg Hx     Eczema Neg Hx     Immunodeficiency Neg Hx     Rhinitis Neg Hx     Urticaria Neg Hx      SOCIAL HISTORY:   Social History     Socioeconomic History    Marital status:      Spouse name: Not on file    Number of children: Not on file    Years of education: Not on file    Highest education level: Not on file   Occupational History    Not on file   Social Needs    Financial resource strain: Not on file    Food insecurity     Worry: Not on file     Inability: Not on file    Transportation needs     Medical: Not on file     Non-medical: Not on file   Tobacco Use    Smoking status: Former Smoker     Types: Cigarettes     Quit date: 2/26/2017     Years since quitting: 3.6    Smokeless tobacco: Never Used   Substance  and Sexual Activity    Alcohol use: Yes     Comment: socially    Drug use: No    Sexual activity: Yes     Partners: Male     Birth control/protection: Other-see comments     Comment:  vasectomy   Lifestyle    Physical activity     Days per week: Not on file     Minutes per session: Not on file    Stress: Not on file   Relationships    Social connections     Talks on phone: Not on file     Gets together: Not on file     Attends [employer] service: Not on file     Active member of club or organization: Not on file     Attends meetings of clubs or organizations: Not on file     Relationship status: Not on file   Other Topics Concern    Not on file   Social History Narrative    Not on file       MEDICATIONS:   Current Outpatient Medications:     diphenhydrAMINE (BENADRYL) 50 MG capsule, Take 1 capsule (50 mg total) by mouth every 6 (six) hours as needed for Itching or Allergies., Disp: 20 capsule, Rfl: 0    EPINEPHrine (EPIPEN) 0.3 mg/0.3 mL AtIn, Inject 0.6 mLs (0.6 mg total) into the muscle as needed (for severe allergic reaction)., Disp: 2 Device, Rfl: 0    Lactobacillus rhamnosus GG (CULTURELLE) 10 billion cell capsule, Take 1 capsule by mouth daily as needed (stomach upset)., Disp: , Rfl:     levocetirizine (XYZAL) 5 MG tablet, Take 1 tablet (5 mg total) by mouth 2 (two) times daily., Disp: 90 tablet, Rfl: 3    lubiprostone (AMITIZA) 24 MCG Cap, Take 1 capsule (24 mcg total) by mouth 2 (two) times daily with meals., Disp: 180 capsule, Rfl: 3    omeprazole (PRILOSEC) 40 MG capsule, Take 40 mg by mouth once daily., Disp: , Rfl:     ondansetron (ZOFRAN-ODT) 8 MG TbDL, Take 8 mg by mouth every 6 (six) hours as needed (nausea)., Disp: , Rfl:     [START ON 10/15/2020] aspirin 325 MG tablet, Take 1 tablet (325 mg total) by mouth once daily. for 21 days, Disp: 21 tablet, Rfl: 0    escitalopram oxalate (LEXAPRO) 20 MG tablet, Take 1 tablet (20 mg total) by mouth once daily. (Patient taking  "differently: Take 30 mg by mouth once daily. ), Disp: 30 tablet, Rfl: 11    [START ON 10/15/2020] ondansetron (ZOFRAN) 4 MG tablet, Take 1 tablet (4 mg total) by mouth every 12 (twelve) hours as needed for Nausea., Disp: 14 tablet, Rfl: 0    [START ON 10/16/2020] oxyCODONE-acetaminophen (PERCOCET)  mg per tablet, Take 1 tablet by mouth every 6 (six) hours as needed for Pain., Disp: 28 tablet, Rfl: 0    sumatriptan (IMITREX) 50 MG tablet, Take 50 mg by mouth once daily., Disp: , Rfl:     [START ON 10/15/2020] traMADoL (ULTRAM) 50 mg tablet, Take 1 tablet (50 mg total) by mouth every 6 (six) hours as needed for Pain., Disp: 28 tablet, Rfl: 0  No current facility-administered medications for this visit.     Facility-Administered Medications Ordered in Other Visits:     0.9%  NaCl infusion, , Intravenous, Continuous, Erinn Brenner MD    sodium chloride 0.9% flush 10 mL, 10 mL, Intravenous, PRN, Erinn Brenner MD  ALLERGIES:   Review of patient's allergies indicates:   Allergen Reactions    Nuts [tree nut] Anaphylaxis     [place] allergy, Peanut allergy    Sesame seed Anaphylaxis       VITAL SIGNS: Ht 5' 2" (1.575 m)   Wt (!) 137.4 kg (303 lb)   BMI 55.42 kg/m²      Risks, indications and benefits of the surgical procedure were discussed with the patient. All questions with regard to surgery, rehab, expected return to functional activities, activities of daily living and recreational endeavors were answered to her satisfaction.    It was explained to the patient that there may be an increase in surgical risks if the patient has certain co-morbidities such as but not limited to: Obesity, Cardiovascular issues (CHF, CAD, Arrhythmias), chronic pulmonary issues, previous or current neurovascular/neurological issues, previous strokes, diabetes mellitus, previous wound healing issues, previous wound or skin infections, PVD, clotting disorders, if the patient uses chronic steroids, if the patient takes " or has immune compromising medications or diseases, or has previously or currently used tobacco products.     The patient verbalized that he/she does not have any additional clotting, bleeding, or blood disorders, other than what is list in her chart on today's review.     Then a brief history and physical exam were performed.    Review of Systems   Constitution: Negative. Negative for chills, fever and night sweats.   HENT: Negative for congestion and headaches.    Eyes: Negative for blurred vision, left vision loss and right vision loss.   Cardiovascular: Negative for chest pain and syncope.   Respiratory: Negative for cough and shortness of breath.    Endocrine: Negative for polydipsia, polyphagia and polyuria.   Hematologic/Lymphatic: Negative for bleeding problem. Does not bruise/bleed easily.   Skin: Negative for dry skin, itching and rash.   Musculoskeletal: Negative for falls and muscle weakness.   Gastrointestinal: Negative for abdominal pain and bowel incontinence.   Genitourinary: Negative for bladder incontinence and nocturia.   Neurological: Negative for disturbances in coordination, loss of balance and seizures.   Psychiatric/Behavioral: Negative for depression. The patient does not have insomnia.    Allergic/Immunologic: Negative for hives and persistent infections.     PHYSICAL EXAM:  GEN: A&Ox3, WD WN NAD  HEENT: WNL  CHEST: CTAB, no W/R/R  HEART: RRR, no M/R/G  ABD: Soft, NT ND, BS x4 QUADS  MS; See Epic  NEURO: CN II-XII intact       The surgical consent was then reviewed with the patient, who agreed with all the contents of the consent form and it was signed. [facility] surgery for the anesthesia portion of her perioperative paperwork.   For all physicians except for Dr. Antonio, we will email and possibly fax the consent forms and booking sheets to [facility] pre-admit.    The patient was given the opportunity to ask questions  about the surgical plan and consent form, and once no other questions were asked, I proceeded with the pre-op appointment.    PHYSICAL THERAPY:  She was also instructed regarding physical therapy and will begin on  POD#3-5. She was given a copy of the original prescription to schedule. Another copy of this prescription was also faxed to [facility].    POST OP CARE:instructions were reviewed including care of the wound and dressing after surgery and when she can shower.     CRUTCHES OR WALKER: It was explained to the patient that if they are having a lower extremity surgery that they will require either a walker or crutches to ambulate safely with after surgery. It was explained that a cane or other assistive devices are not sufficient to safely ambulate with after surgery. I explained to the patient that I will place an order for them to receive either crutches or a walker after surgery to go home with. It was explained that if they have crutches or a walker at home already, that they are REQUIRED to bring them to the hospital on the day of surgery. It was explained that if they do not have them at the hospital on the day of surgery that they WILL be provided a new pair or crutches or a walker to go home with to ensure ambulation will be safe if the patient needs to stop somewhere on the way home.      PAIN MANAGEMENT: June Reyes was also given her pain management regimen, which includes the TENS unit given to her by [facility] along with the education required for its use. She was also instructed regarding the Polar ice unit that will be in place after surgery and her postoperative pain medications.     PATIENT WILL RECEIVE TENS UNIT POSTOPERATIVELY    PAIN MEDICATION:  Percocet 10/325mg 1 po q 4-6 hours prn pain  Ultram 50 mg Take 1-2 p.o. q.6 hours p.r.n. breakthrough pain,   Zofran 4 mg one p.o. q.12 hours p.r.n. nausea and vomiting.  Aspirin 325 mg one p.o. QD x 3 weeks for DVT prophylaxis    Post op meds to be  picked up by patient the evening before surgery.    The patient was told that narcotic pain medications may make them drowsy and instructions were given to not sign legal documents, drive or operate heavy machinery, cars, or equipment while under the influence of narcotic medications. The patient was told and understands that narcotic pain medications should only be used as needed to control pain and that other options of pain control include TENs unit and ice packs/unit.     Patient was instructed to purchase and take Colace to counter possible GI side effects of taking opiates.     DVT prophylaxis was discussed with the patient today including risk factors for developing DVTs and history of DVTs. The patient was asked if any specific recommendations were given from the doctor/s that did pre-operative surgical clearance. The patient was then given an education sheet about DVTs and PE with warning signs and symptoms of both and steps to take if they suspect either of these.    Patient was asked if they were taking or using OCP pills or devices. If they answered yes, then they were instructed to stop using OCPs at this pre-operative appointment until 2 months post-op to help prevent DVT development. They understand that there are other forms of birth control that do not involve hormones. They expressed understanding that ignoring/not following this instruction could result in a DVT which could turn into a deadly pulmonary embolism.     This along with the Modified Caprini risk assessment model for VTE in general surgical patients was used to determine the patient's DVT risk.     From: Tulio LAIRD, Huber DA, Corina MANDUJANO, et al. Prevention of VTE in nonorthopedic surgical patients: antithrombotic therapy and prevention of thrombosis, 9th ed: American College of Chest Physicians evidence-based clinical practical guidelines. Chest 2012; 141:e227S. Copyright © 2012. Reproduced with permission from the American College of Chest  Physicians.    The below listed DVT prophylaxis regimen was discussed along with SCDs during surgery and bilateral SOTERO compression stockings to be used post-op. Length of treatment has been determined to be 10-42 days post-op by the above noted Caprini assessment model. Early ambulation post-op was also discussed and emphasized with the patient.     Patient was instructed to buy and take:  Aspirin 325mg QD x 3 weeks for DVT prophylaxis starting on the evening after surgery.  Patient will also use bilateral TEDs on lower extremities, SCDs during surgery, and early ambulation post-op. If the patient was previously taking 81mg baby aspirin, they were told to not take it will using the above stated aspirin and to restart the 81mg aspirin after completion of the aspirin dose.      Patient was also told to buy over the counter Prilosec medication and take it once daily for GI protection as long as they are taking NSAIDs or Aspirin.     Published data in Julia WARREN et al. J Arthroplasty. Oct; 31(10):2237-40, 2016; showed that aspirin use as prophylaxis during revision total joint arthroplasty was more effective than warfarin in preventing symptomatic venous thromboembolic events and was associated with lower complications.  Patients in the study were also treated with intermittent pneumatic compression devices. Compression stockings would be our method of mechanical prophylaxis, which has been shown to be similar to pneumatic compression in the systematic review, Michael RJ, et al. Pennie Surg. Feb; 239(2): 162-171, 2004.     Results showed a significantly higher incidence of symptomatic venous thromboembolic events among patients in the warfarin group vs. the aspirin group (1.75% vs. 0.56%). Researchers also noted a bleeding event rate of 1.5% among patients who received warfarin compared with a rate of 0.4% among patients who received aspirin.    I explained to following and the patient expressed understanding:  The  patient is currently aware of the COVID19 pandemic and that proceeding with their surgical procedure could potentially increase exposure to coronavirus in the community. The patient understands that there is the possibility of delayed or cancelled appts or PT visits in the future. They understand that infection with the coronavirus could complicate their surgery recovery. They are aware of the current policies and procedures of [facility] and the government regarding the pandemic and they were given the option of delaying my surgery. The patient elects to proceed with surgery at this time.       The patient has been scheduled to undergo coronavirus testing on the day prior to surgery.     The patient was instructed to practice strict social distancing, hand washing/hygiene, respiratory hygiene, and cough etiquette from now until 6 weeks following surgery to reduce the risk of dolly coronavirus.    Patient denies history of seizures.     As there were no other questions to be asked, she was given my business card along with Sravan Antonio MD business card if she has any questions or concerns prior to surgery or in the postop period.

## 2020-10-14 LAB — SARS-COV-2 RNA RESP QL NAA+PROBE: NOT DETECTED

## 2020-10-18 ENCOUNTER — PATIENT MESSAGE (OUTPATIENT)
Dept: SPORTS MEDICINE | Facility: CLINIC | Age: 35
End: 2020-10-18

## 2020-10-21 DIAGNOSIS — M25.511 RIGHT SHOULDER PAIN, UNSPECIFIED CHRONICITY: Primary | ICD-10-CM

## 2020-10-21 RX ORDER — HYDROCODONE BITARTRATE AND ACETAMINOPHEN 10; 325 MG/1; MG/1
1 TABLET ORAL EVERY 6 HOURS PRN
Qty: 28 TABLET | Refills: 0 | Status: SHIPPED | OUTPATIENT
Start: 2020-10-21 | End: 2021-04-22

## 2020-10-28 ENCOUNTER — PATIENT MESSAGE (OUTPATIENT)
Dept: ORTHOPEDICS | Facility: CLINIC | Age: 35
End: 2020-10-28

## 2020-10-28 ENCOUNTER — TELEPHONE (OUTPATIENT)
Dept: NEUROLOGY | Facility: CLINIC | Age: 35
End: 2020-10-28

## 2020-10-28 NOTE — TELEPHONE ENCOUNTER
Spoke with patient, offered to provide numbers to [facility] Neurology Dept.  Patient will call Medicaid for in network providers.

## 2020-10-28 NOTE — TELEPHONE ENCOUNTER
----- Message from Montse Zaman sent at 10/28/2020  8:56 AM CDT -----  Contact: self [phone]  Patient is calling to schedule a appointment for migraines. Please call

## 2020-11-05 ENCOUNTER — PATIENT MESSAGE (OUTPATIENT)
Dept: SPORTS MEDICINE | Facility: CLINIC | Age: 35
End: 2020-11-05

## 2020-11-14 ENCOUNTER — HOSPITAL ENCOUNTER (EMERGENCY)
Facility: HOSPITAL | Age: 35
Discharge: HOME | End: 2020-11-14
Attending: EMERGENCY MEDICINE
Payer: MEDICAID

## 2020-11-14 VITALS
HEIGHT: 62 IN | BODY MASS INDEX: 53.92 KG/M2 | TEMPERATURE: 98 F | SYSTOLIC BLOOD PRESSURE: 111 MMHG | OXYGEN SATURATION: 98 % | RESPIRATION RATE: 20 BRPM | DIASTOLIC BLOOD PRESSURE: 56 MMHG | WEIGHT: 293 LBS | HEART RATE: 103 BPM

## 2020-11-14 DIAGNOSIS — J06.9 UPPER RESPIRATORY TRACT INFECTION, UNSPECIFIED TYPE: Primary | ICD-10-CM

## 2020-11-14 DIAGNOSIS — R35.0 FREQUENT URINATION: ICD-10-CM

## 2020-11-14 LAB
BILIRUB UR QL STRIP: NEGATIVE
CLARITY UR REFRACT.AUTO: CLEAR
COLOR UR AUTO: ABNORMAL
GLUCOSE UR QL STRIP: NEGATIVE
HGB UR QL STRIP: ABNORMAL
INFLUENZA A, MOLECULAR: NEGATIVE
INFLUENZA B, MOLECULAR: NEGATIVE
KETONES UR QL STRIP: NEGATIVE
LEUKOCYTE ESTERASE UR QL STRIP: ABNORMAL
MICROSCOPIC COMMENT: ABNORMAL
NITRITE UR QL STRIP: NEGATIVE
PH UR STRIP: 5 [PH] (ref 5–8)
PROT UR QL STRIP: ABNORMAL
RBC #/AREA URNS AUTO: 2 /HPF (ref 0–4)
SARS-COV-2 RDRP RESP QL NAA+PROBE: NEGATIVE
SP GR UR STRIP: 1.02 (ref 1–1.03)
SPECIMEN SOURCE: NORMAL
URN SPEC COLLECT METH UR: ABNORMAL
UROBILINOGEN UR STRIP-ACNC: 1 EU/DL
WBC #/AREA URNS AUTO: 6 /HPF (ref 0–5)

## 2020-11-14 PROCEDURE — 99284 EMERGENCY DEPT VISIT MOD MDM: CPT | Mod: ER

## 2020-11-14 PROCEDURE — 25000003 PHARM REV CODE 250: Mod: ER | Performed by: EMERGENCY MEDICINE

## 2020-11-14 PROCEDURE — 87502 INFLUENZA DNA AMP PROBE: CPT | Mod: ER

## 2020-11-14 PROCEDURE — U0002 COVID-19 LAB TEST NON-CDC: HCPCS | Mod: ER

## 2020-11-14 PROCEDURE — 81000 URINALYSIS NONAUTO W/SCOPE: CPT | Mod: ER

## 2020-11-14 RX ORDER — BUTALBITAL, ACETAMINOPHEN AND CAFFEINE 50; 325; 40 MG/1; MG/1; MG/1
1 TABLET ORAL EVERY 6 HOURS PRN
Qty: 6 TABLET | Refills: 0 | Status: SHIPPED | OUTPATIENT
Start: 2020-11-14 | End: 2020-11-21

## 2020-11-14 RX ORDER — ALBUTEROL SULFATE 90 UG/1
1-2 AEROSOL, METERED RESPIRATORY (INHALATION) EVERY 6 HOURS PRN
Qty: 8 G | Refills: 0 | Status: SHIPPED | OUTPATIENT
Start: 2020-11-14 | End: 2021-06-14 | Stop reason: SDUPTHER

## 2020-11-14 RX ORDER — BENZONATATE 200 MG/1
200 CAPSULE ORAL 3 TIMES DAILY PRN
Qty: 20 CAPSULE | Refills: 0 | Status: SHIPPED | OUTPATIENT
Start: 2020-11-14 | End: 2021-04-22

## 2020-11-14 RX ORDER — BUTALBITAL, ACETAMINOPHEN AND CAFFEINE 50; 325; 40 MG/1; MG/1; MG/1
1 TABLET ORAL
Status: COMPLETED | OUTPATIENT
Start: 2020-11-14 | End: 2020-11-14

## 2020-11-14 RX ADMIN — BUTALBITAL, ACETAMINOPHEN, AND CAFFEINE 1 TABLET: 50; 325; 40 TABLET, COATED ORAL at 10:11

## 2020-11-14 NOTE — DISCHARGE INSTRUCTIONS
Thank you for choosing [facility]! We appreciate you coming to us for your medical care. We hope you feel better soon! Please come back to [facility] for all of your future medical needs.      Our goal in the emergency department is to always give you outstanding care and exceptional service. You may receive a survey by mail or e-mail in the next week regarding your experience in our ED. We would greatly appreciate your completing and returning the survey. Your feedback provides us with a way to recognize our staff who give very good care and it helps us learn how to improve when your experience was below our aspiration of excellence.      Sincerely,    Virgil Rendon MD  Medical Director  [facility]

## 2020-11-14 NOTE — ED PROVIDER NOTES
Encounter Date: 11/14/2020    History     Chief Complaint   Patient presents with    COVID-19 Concerns     Pt reports nasal congestion, SOB, headaches and cough since Thursday. Pt states she had COVID in March.         11/14/2020 10:19 AM     Chief Complaint:  COUGH      This is a 35 y.o. female who has  has a past medical history of Allergy, Anxiety, BMI 50.0-59.9, adult,   GERD (gastroesophageal reflux disease), Hypoglycemic disorder, Obesity, Thyroid disease, and Vertigo..  Pt presents to the Emergency Department with cough x 3 DAYS.   Symptoms are associated with sore throat, nasal congestion, rhinorrhea, headaches.  Also associated with chest tightness and shortness of breath.  Pt denies fever and states that she has had chills.  Pt denies vomiting, diarrhea.   Symptoms are aggravated deep inspiration, worse at night.  Symptoms alleviated by nothing.  Pt has no sick contact.    The history was provided by pt.      REVIEW OF SYSTEMS:    As per HPI and as below:  General:  no fever  HENT:  + congestion, + rhinorrhea  Eye: no eye pain, no photophobia, no discharge  Respiratory: + cough, (+) shortness of breath, (+)  wheezing at night, (+) chest tightness   Cardiovascular: no chest pain  GI: no abdominal pain, no nausea, no change in bowel habits  Musculoskeletal: no myalgias    Endocrine:  No generalized weakness  Neurological:  no headache  Skin: no rash, no pallor  Psychiatric:  no anxiety, no mood disturbance      LMP: No LMP recorded. Patient has had an injection.      PAST MEDICAL HISTORY:   Past Medical History:   Diagnosis Date    Allergy     Anxiety     BMI 50.0-59.9, adult     GERD (gastroesophageal reflux disease)     Hypoglycemic disorder     Obesity     Thyroid disease     Vertigo          FAMILY HISTORY:  Family History   Problem Relation Age of Onset    Diabetes Mother     Hypertension Mother     Depression Mother     Achalasia Mother     Hypertension Father     Heart disease Father      Allergies Father     Cancer Maternal Aunt     Diabetes Paternal Grandfather     Autism Brother     Allergies Son     Allergic rhinitis Neg Hx     Angioedema Neg Hx     Asthma Neg Hx     Atopy Neg Hx     Eczema Neg Hx     Immunodeficiency Neg Hx     Rhinitis Neg Hx     Urticaria Neg Hx          SOCIAL HISTORY:  Social History     Socioeconomic History    Marital status:      Spouse name: Not on file    Number of children: Not on file    Years of education: Not on file    Highest education level: Not on file   Occupational History    Not on file   Social Needs    Financial resource strain: Not on file    Food insecurity     Worry: Not on file     Inability: Not on file    Transportation needs     Medical: Not on file     Non-medical: Not on file   Tobacco Use    Smoking status: Former Smoker     Types: Cigarettes     Quit date: 2/26/2017     Years since quitting: 3.7    Smokeless tobacco: Never Used   Substance and Sexual Activity    Alcohol use: Yes     Comment: socially    Drug use: No    Sexual activity: Yes     Partners: Male     Birth control/protection: Other-see comments     Comment:  vasectomy   Lifestyle    Physical activity     Days per week: Not on file     Minutes per session: Not on file    Stress: Not on file   Relationships    Social connections     Talks on phone: Not on file     Gets together: Not on file     Attends Sabianism service: Not on file     Active member of club or organization: Not on file     Attends meetings of clubs or organizations: Not on file     Relationship status: Not on file   Other Topics Concern    Not on file   Social History Narrative    Not on file         MEDICATIONS:   Current Facility-Administered Medications on File Prior to Encounter   Medication Dose Route Frequency Provider Last Rate Last Dose    0.9%  NaCl infusion   Intravenous Continuous Erinn Brenner MD   Stopped at 10/16/20 0920    sodium chloride 0.9% flush  10 mL  10 mL Intravenous PRN Erinn Brenner MD         Current Outpatient Medications on File Prior to Encounter   Medication Sig Dispense Refill    amitriptyline (ELAVIL) 50 MG tablet Take 50 mg by mouth every evening.      aspirin 325 MG tablet Take 1 tablet (325 mg total) by mouth once daily. for 21 days 21 tablet 0    diphenhydrAMINE (BENADRYL) 50 MG capsule Take 1 capsule (50 mg total) by mouth every 6 (six) hours as needed for Itching or Allergies. 20 capsule 0    EPINEPHrine (EPIPEN) 0.3 mg/0.3 mL AtIn Inject 0.6 mLs (0.6 mg total) into the muscle as needed (for severe allergic reaction). 2 Device 0    escitalopram oxalate (LEXAPRO) 20 MG tablet Take 40 mg by mouth once daily.      HYDROcodone-acetaminophen (NORCO)  mg per tablet Take 1 tablet by mouth every 6 (six) hours as needed for Pain. 28 tablet 0    Lactobacillus rhamnosus GG (CULTURELLE) 10 billion cell capsule Take 1 capsule by mouth daily as needed (stomach upset).      levocetirizine (XYZAL) 5 MG tablet Take 1 tablet (5 mg total) by mouth 2 (two) times daily. (Patient taking differently: Take 10 mg by mouth once daily. ) 90 tablet 3    lubiprostone (AMITIZA) 24 MCG Cap Take 1 capsule (24 mcg total) by mouth 2 (two) times daily with meals. (Patient taking differently: Take 24 mcg by mouth as needed. ) 180 capsule 3    multivitamin with minerals tablet Take 1 tablet by mouth once daily.      omeprazole (PRILOSEC) 40 MG capsule Take 40 mg by mouth once daily.      ondansetron (ZOFRAN) 4 MG tablet Take 1 tablet (4 mg total) by mouth every 12 (twelve) hours as needed for Nausea. 14 tablet 0    ondansetron (ZOFRAN-ODT) 8 MG TbDL Take 8 mg by mouth every 6 (six) hours as needed (nausea).      oxyCODONE-acetaminophen (PERCOCET)  mg per tablet Take 1 tablet by mouth every 6 (six) hours as needed for Pain. 28 tablet 0    sumatriptan (IMITREX) 100 MG tablet Take 100 mg by mouth every 2 (two) hours as needed for Migraine.       tolterodine (DETROL LA) 2 MG Cp24 Take 4 mg by mouth once daily.      traMADoL (ULTRAM) 50 mg tablet Take 1 tablet (50 mg total) by mouth every 6 (six) hours as needed for Pain. 28 tablet 0         ALLERGIES:   Review of patient's allergies indicates:   Allergen Reactions    Nuts [tree nut] Anaphylaxis     [place] allergy, Peanut allergy    Sesame seed Anaphylaxis         Physical Exam     GENERALIZED APPEARANCE:   Alert and oriented x3, no acute distress  VITAL SIGNS:  Per nurse's note, reviewed by me.  SKIN:  Warm, dry; no cyanosis; no rash.  HEAD:  Atraumatic, normocephalic  EYES:  no conjunctival pallor, no scleral icterus.   ENMT:  Pharynx:  (+) erythema, (+) minimal tonsillar swelling without exudates.  Negative for uvular deviation; airway patent; mucous membranes moist.  NECK:  no tenderness, no stiffness, full ROM; (-)  cervical lymphadenopathy  CHEST AND RESPIRATORY:  no rales, no rhonchi, no wheezes; breath sounds equal bilaterally.  HEART AND CARDIOVASCULAR:  Regular rate; regular rhythm; no murmur, no gallop, distal pulses palpable  EXTREMITIES:  no deformity, no edema, no tenderness  NEURO AND PSYCH:  Mental status as above. Strength and sensation grossly intact bilaterally.      ED Course     DIAGNOSTICS:    Labs  Labs Reviewed   INFLUENZA A & B BY MOLECULAR   SARS-COV-2 RNA AMPLIFICATION, QUAL   URINALYSIS, REFLEX TO URINE CULTURE         Imaging  Imaging Results    None           Medical Decision Making:  This is a 35 y.o. female who I believe has a viral upper respiratory infection.    Patient does not appear to have pneumonia, otitis media, bacterial sinusitis, strep pharyngitis, parapharyngeal or peritonsillar abscess.    Tests for flu and COVID were negative today.    Clinical impression and rx discussed with patient.   Pt to call for follow up with PCP or referred physician in 7 days.   Pt is to return to the ED immediately for any new or worsening symptoms, or for any other concerns.    Pt  had time for ask questions to which they were addressed.      Patient did mention that she was having some frequent urination so I added on a UA.  Will call her with result.  Pt expressed understanding.        Clinical Impression:     1. Upper respiratory tract infection, unspecified type             Disposition:  Patient will be discharged in stable condition.     Virgil Rendon MD  11/14/20 1024

## 2020-11-23 DIAGNOSIS — L29.9 ITCHING: ICD-10-CM

## 2020-11-24 RX ORDER — LEVOCETIRIZINE DIHYDROCHLORIDE 5 MG/1
10 TABLET, FILM COATED ORAL DAILY
Qty: 180 TABLET | Refills: 1 | Status: SHIPPED | OUTPATIENT
Start: 2020-11-24 | End: 2021-07-15

## 2020-11-27 ENCOUNTER — DOCUMENTATION ONLY (OUTPATIENT)
Dept: BARIATRICS | Facility: CLINIC | Age: 35
End: 2020-11-27

## 2020-12-25 ENCOUNTER — PATIENT MESSAGE (OUTPATIENT)
Dept: SPORTS MEDICINE | Facility: CLINIC | Age: 35
End: 2020-12-25

## 2020-12-29 ENCOUNTER — PATIENT MESSAGE (OUTPATIENT)
Dept: ALLERGY | Facility: CLINIC | Age: 35
End: 2020-12-29

## 2020-12-29 ENCOUNTER — HOSPITAL ENCOUNTER (EMERGENCY)
Facility: HOSPITAL | Age: 35
Discharge: HOME | End: 2020-12-29
Attending: EMERGENCY MEDICINE
Payer: MEDICAID

## 2020-12-29 VITALS
DIASTOLIC BLOOD PRESSURE: 83 MMHG | HEART RATE: 110 BPM | TEMPERATURE: 98 F | OXYGEN SATURATION: 97 % | RESPIRATION RATE: 19 BRPM | BODY MASS INDEX: 48.82 KG/M2 | HEIGHT: 65 IN | SYSTOLIC BLOOD PRESSURE: 141 MMHG | WEIGHT: 293 LBS

## 2020-12-29 DIAGNOSIS — T78.2XXA ANAPHYLAXIS, INITIAL ENCOUNTER: Primary | ICD-10-CM

## 2020-12-29 LAB
ALBUMIN SERPL BCP-MCNC: 4.3 G/DL (ref 3.5–5.2)
ALP SERPL-CCNC: 95 U/L (ref 38–126)
ALT SERPL W/O P-5'-P-CCNC: 20 U/L (ref 10–44)
ANION GAP SERPL CALC-SCNC: 9 MMOL/L (ref 8–16)
AST SERPL-CCNC: 23 U/L (ref 15–46)
B-HCG UR QL: NEGATIVE
BASOPHILS # BLD AUTO: 0.05 K/UL (ref 0–0.2)
BASOPHILS NFR BLD: 0.4 % (ref 0–1.9)
BILIRUB SERPL-MCNC: 0.5 MG/DL (ref 0.1–1)
CALCIUM SERPL-MCNC: 9.6 MG/DL (ref 8.7–10.5)
CHLORIDE SERPL-SCNC: 109 MMOL/L (ref 95–110)
CO2 SERPL-SCNC: 23 MMOL/L (ref 23–29)
CREAT SERPL-MCNC: 0.56 MG/DL (ref 0.5–1.4)
DIFFERENTIAL METHOD: ABNORMAL
EOSINOPHIL # BLD AUTO: 0.5 K/UL (ref 0–0.5)
EOSINOPHIL NFR BLD: 3.6 % (ref 0–8)
ERYTHROCYTE [DISTWIDTH] IN BLOOD BY AUTOMATED COUNT: 13.3 % (ref 11.5–14.5)
EST. GFR  (AFRICAN AMERICAN): >60 ML/MIN/1.73 M^2
EST. GFR  (NON AFRICAN AMERICAN): >60 ML/MIN/1.73 M^2
GLUCOSE SERPL-MCNC: 106 MG/DL (ref 70–110)
HCT VFR BLD AUTO: 41.9 % (ref 37–48.5)
HGB BLD-MCNC: 14.1 G/DL (ref 12–16)
IMM GRANULOCYTES # BLD AUTO: 0.08 K/UL (ref 0–0.04)
IMM GRANULOCYTES NFR BLD AUTO: 0.6 % (ref 0–0.5)
LYMPHOCYTES # BLD AUTO: 4.3 K/UL (ref 1–4.8)
LYMPHOCYTES NFR BLD: 30.6 % (ref 18–48)
MCH RBC QN AUTO: 29.5 PG (ref 27–31)
MCHC RBC AUTO-ENTMCNC: 33.7 G/DL (ref 32–36)
MCV RBC AUTO: 88 FL (ref 82–98)
MONOCYTES # BLD AUTO: 1.3 K/UL (ref 0.3–1)
MONOCYTES NFR BLD: 9.4 % (ref 4–15)
NEUTROPHILS # BLD AUTO: 7.8 K/UL (ref 1.8–7.7)
NEUTROPHILS NFR BLD: 55.4 % (ref 38–73)
NRBC BLD-RTO: 0 /100 WBC
PLATELET # BLD AUTO: 301 K/UL (ref 150–350)
PMV BLD AUTO: 11.4 FL (ref 9.2–12.9)
POTASSIUM SERPL-SCNC: 3.5 MMOL/L (ref 3.5–5.1)
PROT SERPL-MCNC: 7.7 G/DL (ref 6–8.4)
RBC # BLD AUTO: 4.78 M/UL (ref 4–5.4)
SODIUM SERPL-SCNC: 141 MMOL/L (ref 136–145)
TSH SERPL DL<=0.005 MIU/L-ACNC: 4.74 UIU/ML (ref 0.4–4)
UUN UR-MCNC: 13 MG/DL (ref 7–17)
WBC # BLD AUTO: 14.09 K/UL (ref 3.9–12.7)

## 2020-12-29 PROCEDURE — 99284 EMERGENCY DEPT VISIT MOD MDM: CPT | Mod: 25,ER

## 2020-12-29 PROCEDURE — 94760 N-INVAS EAR/PLS OXIMETRY 1: CPT | Mod: ER

## 2020-12-29 PROCEDURE — 85025 COMPLETE CBC W/AUTO DIFF WBC: CPT | Mod: ER

## 2020-12-29 PROCEDURE — 96375 TX/PRO/DX INJ NEW DRUG ADDON: CPT | Mod: ER

## 2020-12-29 PROCEDURE — 94640 AIRWAY INHALATION TREATMENT: CPT | Mod: ER

## 2020-12-29 PROCEDURE — 25000242 PHARM REV CODE 250 ALT 637 W/ HCPCS: Mod: ER | Performed by: EMERGENCY MEDICINE

## 2020-12-29 PROCEDURE — 63600175 PHARM REV CODE 636 W HCPCS: Mod: ER | Performed by: EMERGENCY MEDICINE

## 2020-12-29 PROCEDURE — 96374 THER/PROPH/DIAG INJ IV PUSH: CPT | Mod: ER

## 2020-12-29 PROCEDURE — 84443 ASSAY THYROID STIM HORMONE: CPT | Mod: ER

## 2020-12-29 PROCEDURE — 80053 COMPREHEN METABOLIC PANEL: CPT | Mod: ER

## 2020-12-29 PROCEDURE — 81025 URINE PREGNANCY TEST: CPT | Mod: ER

## 2020-12-29 PROCEDURE — 83520 IMMUNOASSAY QUANT NOS NONAB: CPT | Mod: ER

## 2020-12-29 PROCEDURE — 84439 ASSAY OF FREE THYROXINE: CPT

## 2020-12-29 PROCEDURE — 25000003 PHARM REV CODE 250: Mod: ER | Performed by: EMERGENCY MEDICINE

## 2020-12-29 RX ORDER — ALBUTEROL SULFATE 2.5 MG/.5ML
2.5 SOLUTION RESPIRATORY (INHALATION)
Status: DISPENSED | OUTPATIENT
Start: 2020-12-29 | End: 2020-12-29

## 2020-12-29 RX ORDER — METHYLPREDNISOLONE SOD SUCC 125 MG
125 VIAL (EA) INJECTION
Status: COMPLETED | OUTPATIENT
Start: 2020-12-29 | End: 2020-12-29

## 2020-12-29 RX ORDER — ALBUTEROL SULFATE 2.5 MG/.5ML
2.5 SOLUTION RESPIRATORY (INHALATION)
Status: COMPLETED | OUTPATIENT
Start: 2020-12-29 | End: 2020-12-29

## 2020-12-29 RX ORDER — DIPHENHYDRAMINE HYDROCHLORIDE 50 MG/ML
50 INJECTION INTRAMUSCULAR; INTRAVENOUS
Status: COMPLETED | OUTPATIENT
Start: 2020-12-29 | End: 2020-12-29

## 2020-12-29 RX ORDER — EPINEPHRINE 0.3 MG/.3ML
1 INJECTION SUBCUTANEOUS
Qty: 2 EACH | Refills: 3 | Status: SHIPPED | OUTPATIENT
Start: 2020-12-29 | End: 2023-02-03

## 2020-12-29 RX ORDER — PREDNISONE 20 MG/1
60 TABLET ORAL DAILY
Qty: 3 TABLET | Refills: 0 | Status: SHIPPED | OUTPATIENT
Start: 2020-12-29 | End: 2020-12-30

## 2020-12-29 RX ORDER — FAMOTIDINE 10 MG/ML
20 INJECTION INTRAVENOUS
Status: COMPLETED | OUTPATIENT
Start: 2020-12-29 | End: 2020-12-29

## 2020-12-29 RX ADMIN — METHYLPREDNISOLONE SODIUM SUCCINATE 125 MG: 125 INJECTION, POWDER, FOR SOLUTION INTRAMUSCULAR; INTRAVENOUS at 07:12

## 2020-12-29 RX ADMIN — ALBUTEROL SULFATE 2.5 MG: 2.5 SOLUTION RESPIRATORY (INHALATION) at 07:12

## 2020-12-29 RX ADMIN — DIPHENHYDRAMINE HYDROCHLORIDE 50 MG: 50 INJECTION, SOLUTION INTRAMUSCULAR; INTRAVENOUS at 07:12

## 2020-12-29 RX ADMIN — FAMOTIDINE 20 MG: 10 INJECTION, SOLUTION INTRAVENOUS at 07:12

## 2020-12-30 ENCOUNTER — PATIENT MESSAGE (OUTPATIENT)
Dept: ALLERGY | Facility: CLINIC | Age: 35
End: 2020-12-30

## 2020-12-30 ENCOUNTER — TELEPHONE (OUTPATIENT)
Dept: ALLERGY | Facility: CLINIC | Age: 35
End: 2020-12-30

## 2020-12-30 LAB — T4 FREE SERPL-MCNC: 1.08 NG/DL (ref 0.71–1.51)

## 2020-12-30 NOTE — ED NOTES
IV that was placed by another nurse removed. Site CDI. No redness or swelling noted. Coban and gauze placed to site.

## 2020-12-30 NOTE — TELEPHONE ENCOUNTER
----- Message from Stephanie Mcduffie sent at 12/30/2020  8:59 AM CST -----  Regarding: Allergic reaction  Reason: Patient wanted to inform you that she was in the ER on 01/29/2020 from a allergic reaction from pet dander. Throat was closing up. Please Call      Contact: [phone] (home)

## 2020-12-30 NOTE — ED NOTES
Physician at bedside.  Pt is lying on stretcher c respirations even, unlabored c NADN.   Bed locked and low c side rails up c call bell in reach.   Cardiac monitor, bp cuff, and pulse ox in place.

## 2020-12-30 NOTE — ED NOTES
MD at bedside for reeval.  Pt verbalized understanding of needing to be continued to monitor bc of receiving epi pen at 1900

## 2020-12-30 NOTE — ED TRIAGE NOTES
"Reports to ED c c/o allergic reaction. Reports allergic to cat and dog dander. States has been watching brother cats and dogs all week and then today was cleaning his house and "must have stirred up the dander." States throat tightened, eyes itched, R side of hand itched, wheezing, and SOB began. Reports 90% RA.  Gave self epi pen and albuterol inhaler and O2 increased. Pt no longer complains of tightened throat or itching during triage. Reports hard to breathe. NO swelling noted to throat/tongue. Audible expiratory wheezing and dry cough noted.   "

## 2020-12-31 NOTE — ED PROVIDER NOTES
Encounter Date: 12/29/2020       History     Chief Complaint   Patient presents with    Allergic Reaction     pt went to feed brothers animals. is allergic to dogs and cats. started wheezing and getting sob. used epipen and albuterol rescue inhaler. states o2 sat was 90% before meds. upon arrival o2 97% room air. expiratiory wheezes.      HPI     35-year-old femalets for allergic reaction.  This started 10 minutes prior to arrival after she was vacuuming her brother's house while the airway.  She is staying there for the last week and  his dogs.  She reports history of allergy to dog and cat dander.  Has been doing well over the last week without trouble.  Reports that she also has dogs at home, but does not have allergic reaction to them.  After vacuuming for a few minutes today she began to feel very short of breath and as though her throat was tingling and tightening.  She gave herself an EpiPen injection, did an albuterol treatment, and told her  to drive her to the hospital.  She reports she is starting to feeling better after the EpiPen.  This is her 4th allergic reaction this year.  Reports that before this year she had mild allergies but no anaphylaxis.  Denies nausea, vomiting, abdominal pain, hives, syncope.  She notes in just realized that she forgot to take her 10 mg of Xyzal today.  She has 1 additional EpiPen.  No new skin changes over the last year, moles, nodules, rash, etc.      Review of patient's allergies indicates:   Allergen Reactions    Nuts [tree nut] Anaphylaxis     [place] allergy, Peanut allergy    Sesame seed Anaphylaxis    Cat dander     Dog dander      Past Medical History:   Diagnosis Date    Allergy     Anxiety     BMI 50.0-59.9, adult     GERD (gastroesophageal reflux disease)     Hypoglycemic disorder     Obesity     Thyroid disease     Vertigo      Past Surgical History:   Procedure Laterality Date    ADENOIDECTOMY      ARTHROSCOPIC CHONDROPLASTY OF  KNEE JOINT Left 2019    Procedure: ARTHROSCOPY, KNEE, WITH CHONDROPLASTY;  Surgeon: Sravan Antonio MD;  Location: [facility] OR;  Service: Orthopedics;  Laterality: Left;    ARTHROSCOPIC DEBRIDEMENT OF SHOULDER Right 3/1/2019    Procedure: DEBRIDEMENT, SHOULDER, ARTHROSCOPIC;  Surgeon: Sravan Antonio MD;  Location: [facility] OR;  Service: Orthopedics;  Laterality: Right;  labral debridement    ARTHROSCOPIC DEBRIDEMENT OF SHOULDER Right 10/16/2020    Procedure: DEBRIDEMENT, SHOULDER, ARTHROSCOPIC;  Surgeon: Sravan Antonio MD;  Location: [facility] OR;  Service: Orthopedics;  Laterality: Right;    ARTHROSCOPIC REPAIR OF ROTATOR CUFF OF SHOULDER Right 3/1/2019    Procedure: REPAIR, ROTATOR CUFF, ARTHROSCOPIC;  Surgeon: Sravan Antonio MD;  Location: [facility] OR;  Service: Orthopedics;  Laterality: Right;  subscapularis  Regeneten implant    ARTHROSCOPY OF KNEE Left 2019    Procedure: ARTHROSCOPY, KNEE;  Surgeon: Sravan Antonio MD;  Location: [facility] OR;  Service: Orthopedics;  Laterality: Left;  regional w/o catheter / adductor     ARTHROSCOPY OF SHOULDER WITH DECOMPRESSION OF SUBACROMIAL SPACE Right 3/1/2019    Procedure: ARTHROSCOPY, SHOULDER, WITH SUBACROMIAL SPACE DECOMPRESSION;  Surgeon: Sravan Antonio MD;  Location: [facility] OR;  Service: Orthopedics;  Laterality: Right;    ARTHROSCOPY OF SHOULDER WITH DECOMPRESSION OF SUBACROMIAL SPACE Right 10/16/2020    Procedure: ARTHROSCOPY, SHOULDER, WITH SUBACROMIAL SPACE DECOMPRESSION;  Surgeon: Sravan Antonio MD;  Location: [facility] OR;  Service: Orthopedics;  Laterality: Right;    ARTHROSCOPY OF SHOULDER WITH REMOVAL OF DISTAL CLAVICLE Right 10/16/2020    Procedure: ARTHROSCOPY, SHOULDER, WITH DISTAL CLAVICLE EXCISION;  Surgeon: Sravan Antonio MD;  Location: [facility] OR;  Service: Orthopedics;  Laterality: Right;  regional w/o catheter (interscalene)     SECTION      COLONOSCOPY      COLONOSCOPY N/A 2020    Procedure:  COLONOSCOPY;  Surgeon: Erinn Brenner MD;  Location: [facility] ENDO;  Service: Endoscopy;  Laterality: N/A;    DILATION AND CURETTAGE OF UTERUS      ESOPHAGOGASTRODUODENOSCOPY N/A 1/2/2020    Procedure: EGD (ESOPHAGOGASTRODUODENOSCOPY);  Surgeon: Erinn Brenner MD;  Location: [facility] ENDO;  Service: Endoscopy;  Laterality: N/A;    GANGLION CYST EXCISION      RECONSTRUCTION OF LIGAMENT Left 9/6/2019    Procedure: RECONSTRUCTION, LIGAMENT;  Surgeon: Sravan Antonio MD;  Location: [facility] OR;  Service: Orthopedics;  Laterality: Left;  ACL    REPAIR OF MENISCUS OF KNEE Left 9/6/2019    Procedure: REPAIR, MENISCUS, KNEE;  Surgeon: Sravan Antonio MD;  Location: [facility] OR;  Service: Orthopedics;  Laterality: Left;  lateral    TONSILLECTOMY       Family History   Problem Relation Age of Onset    Diabetes Mother     Hypertension Mother     Depression Mother     Achalasia Mother     Hypertension Father     Heart disease Father     Allergies Father     Cancer Maternal Aunt     Diabetes Paternal Grandfather     Autism Brother     Allergies Son     Allergic rhinitis Neg Hx     Angioedema Neg Hx     Asthma Neg Hx     Atopy Neg Hx     Eczema Neg Hx     Immunodeficiency Neg Hx     Rhinitis Neg Hx     Urticaria Neg Hx      Social History     Tobacco Use    Smoking status: Former Smoker     Types: Cigarettes     Quit date: 2/26/2017     Years since quitting: 3.8    Smokeless tobacco: Never Used   Substance Use Topics    Alcohol use: Yes     Comment: socially    Drug use: No     Review of Systems     General: No fever.  No chills.  Eyes: No visual changes.  Head: No headache.    Integument: No rashes or lesions.  Chest: No shortness of breath.  Cardiovascular: No chest pain.  Abdomen: No abdominal pain.  No nausea or vomiting.  Urinary: No abnormal urination.  Neurologic: No focal weakness.  No numbness.  Hematologic: No easy bruising.  Endocrine: No excessive thirst or urination.      Physical  Exam     Initial Vitals   BP Pulse Resp Temp SpO2   12/29/20 1909 12/29/20 1909 12/29/20 1909 12/29/20 1911 12/29/20 1909   (!) 158/111 (!) 122 (!) 35 97.7 °F (36.5 °C) 97 %      MAP       --                Physical Exam     Appearance: No acute distress.  HEENT: Normocephalic. Atraumatic. No conjunctival injection. EOMI. PERRL.  No angioedema.  Neck: No JVD. Neck supple.  No stridor  Chest: Non-tender. Tachypneic. Poor air movement throughout.  Central inspiratory and expiratory wheezing that radiates up into upper airway.  Moderately dyspneic speaking in 3 word sentences, positive accessory muscle use.  Cardiovascular: Tachycardic. Regular rhythm. +2 radial pulses bilaterally.  Abdomen: Not distende.  Nontender d   Musculoskeletal: Good range of motion all joints. No deformities.    Neurologic: Alert and oriented x 3.  Equal strength in upper and lower extremities bilaterally. Normal sensation. No facial droop. Normal speech.    Psych:  Appropriate, conversant   Integumentary: No rashes seen.  Good turgor.  No abrasions.  No ecchymoses.  No urticaria      ED Course   Procedures  Labs Reviewed   CBC W/ AUTO DIFFERENTIAL - Abnormal; Notable for the following components:       Result Value    WBC 14.09 (*)     Immature Granulocytes 0.6 (*)     Gran # (ANC) 7.8 (*)     Immature Grans (Abs) 0.08 (*)     Mono # 1.3 (*)     All other components within normal limits   TSH - Abnormal; Notable for the following components:    TSH 4.740 (*)     All other components within normal limits   COMPREHENSIVE METABOLIC PANEL   PREGNANCY TEST, URINE RAPID    Narrative:     Specimen Source->Urine   TSH   T4, FREE   TRYPTASE          Imaging Results    None          Medical Decision Making:   History:   Old Medical Records: I decided to obtain old medical records.  Old Records Summarized: records from clinic visits.  Clinical Tests:   Lab Tests: Ordered and Reviewed                             Clinical Impression:       ICD-10-CM  ICD-9-CM   1. Anaphylaxis, initial encounter  T78.2XXA 995.0                   For 35-year-old female presents for allergic reaction.  Patient tachycardic, tachypneic, hypertensive, but stable sats and blood pressure.  No angioedema. Poor air movement throughout.  Central inspiratory and expiratory wheezing that radiates up into upper airway.  Moderately dyspneic speaking in 3 word sentences, positive accessory muscle use.  No urticaria.  Abdomen nontender.  Gave 50 mg IV Benadryl, famotidine, Zofran, Solu-Medrol.  Observed for 4 hours no secondary reaction.  Prescribed additional EpiPen for  to  tonight before pharmacy closes and bring with him on route back.  Advised sometimes allergies get worse and she may need to give herself to EpiPen injections next time if the 1st 1 does not improve her airway symptoms.  Obtained serum tryptase level but advised this is nondiagnostic as she is having a current allergic reaction and that she needs a basal tryptase level when she is not having allergic reaction.  She has close follow-up with her allergist.    Discussed results, diagnosis, and treatment plan with pt; advised close follow-up with PCP. Reviewed strict return precautions. Pt confirms understanding and ability to comply.             ED Disposition Condition    Discharge Stable        ED Prescriptions     Medication Sig Dispense Start Date End Date Auth. Provider    EPINEPHrine (EPIPEN) 0.3 mg/0.3 mL AtIn Inject 0.3 mLs (0.3 mg total) into the muscle as needed. 2 each 2020 Leandra Santos MD    predniSONE (DELTASONE) 20 MG tablet () Take 3 tablets (60 mg total) by mouth once daily. for 1 day 3 tablet 2020 Leandra Santos MD        Follow-up Information     Follow up With Specialties Details Why Contact Info    Your Allergist  Call in 1 day                                         Leandra Santos MD  [phone]

## 2021-01-05 LAB — TRYPTASE LEVEL: 7.1 NG/ML

## 2021-01-07 ENCOUNTER — OFFICE VISIT (OUTPATIENT)
Dept: ALLERGY | Facility: CLINIC | Age: 36
End: 2021-01-07
Payer: MEDICAID

## 2021-01-07 DIAGNOSIS — J45.901 ASTHMA WITH ACUTE EXACERBATION, UNSPECIFIED ASTHMA SEVERITY, UNSPECIFIED WHETHER PERSISTENT: ICD-10-CM

## 2021-01-07 DIAGNOSIS — R06.02 SHORTNESS OF BREATH: ICD-10-CM

## 2021-01-07 DIAGNOSIS — Z87.892 HISTORY OF ANAPHYLAXIS: Primary | ICD-10-CM

## 2021-01-07 PROCEDURE — 99214 PR OFFICE/OUTPT VISIT, EST, LEVL IV, 30-39 MIN: ICD-10-PCS | Mod: 95,,, | Performed by: STUDENT IN AN ORGANIZED HEALTH CARE EDUCATION/TRAINING PROGRAM

## 2021-01-07 PROCEDURE — 99214 OFFICE O/P EST MOD 30 MIN: CPT | Mod: 95,,, | Performed by: STUDENT IN AN ORGANIZED HEALTH CARE EDUCATION/TRAINING PROGRAM

## 2021-01-07 RX ORDER — OMALIZUMAB 150 MG/ML
300 INJECTION, SOLUTION SUBCUTANEOUS
Qty: 26 SYRINGE | Refills: 1 | Status: SHIPPED | OUTPATIENT
Start: 2021-01-07 | End: 2021-04-29

## 2021-01-08 ENCOUNTER — SPECIALTY PHARMACY (OUTPATIENT)
Dept: PHARMACY | Facility: CLINIC | Age: 36
End: 2021-01-08

## 2021-01-11 ENCOUNTER — TELEPHONE (OUTPATIENT)
Dept: ALLERGY | Facility: CLINIC | Age: 36
End: 2021-01-11

## 2021-01-11 DIAGNOSIS — J45.50 SEVERE PERSISTENT ASTHMA, UNSPECIFIED WHETHER COMPLICATED: Primary | ICD-10-CM

## 2021-01-19 ENCOUNTER — PATIENT MESSAGE (OUTPATIENT)
Dept: ALLERGY | Facility: CLINIC | Age: 36
End: 2021-01-19

## 2021-01-27 ENCOUNTER — PATIENT MESSAGE (OUTPATIENT)
Dept: SPORTS MEDICINE | Facility: CLINIC | Age: 36
End: 2021-01-27

## 2021-01-28 ENCOUNTER — HOSPITAL ENCOUNTER (OUTPATIENT)
Dept: RADIOLOGY | Facility: HOSPITAL | Age: 36
Discharge: HOME | End: 2021-01-28
Attending: ORTHOPAEDIC SURGERY
Payer: MEDICAID

## 2021-01-28 ENCOUNTER — TELEPHONE (OUTPATIENT)
Dept: SPORTS MEDICINE | Facility: CLINIC | Age: 36
End: 2021-01-28

## 2021-01-28 DIAGNOSIS — M25.569 KNEE PAIN: ICD-10-CM

## 2021-01-28 DIAGNOSIS — M25.569 KNEE PAIN: Primary | ICD-10-CM

## 2021-02-04 ENCOUNTER — HOSPITAL ENCOUNTER (OUTPATIENT)
Dept: RADIOLOGY | Facility: HOSPITAL | Age: 36
Discharge: HOME | End: 2021-02-04
Attending: ORTHOPAEDIC SURGERY
Payer: MEDICAID

## 2021-02-04 DIAGNOSIS — M25.569 KNEE PAIN: ICD-10-CM

## 2021-02-04 PROCEDURE — 73564 X-RAY EXAM KNEE 4 OR MORE: CPT | Mod: TC,FY,PO,LT

## 2021-02-05 ENCOUNTER — OFFICE VISIT (OUTPATIENT)
Dept: ORTHOPEDICS | Facility: CLINIC | Age: 36
End: 2021-02-05
Payer: MEDICAID

## 2021-02-05 DIAGNOSIS — M25.512 CHRONIC LEFT SHOULDER PAIN: Primary | ICD-10-CM

## 2021-02-05 DIAGNOSIS — G89.29 CHRONIC LEFT SHOULDER PAIN: Primary | ICD-10-CM

## 2021-02-05 PROCEDURE — 99999 PR PBB SHADOW E&M-EST. PATIENT-LVL II: CPT | Mod: PBBFAC,,, | Performed by: ORTHOPAEDIC SURGERY

## 2021-02-05 PROCEDURE — 99214 OFFICE O/P EST MOD 30 MIN: CPT | Mod: S$PBB,,, | Performed by: ORTHOPAEDIC SURGERY

## 2021-02-05 PROCEDURE — 99212 OFFICE O/P EST SF 10 MIN: CPT | Mod: PBBFAC,PN | Performed by: ORTHOPAEDIC SURGERY

## 2021-02-05 PROCEDURE — 99214 PR OFFICE/OUTPT VISIT, EST, LEVL IV, 30-39 MIN: ICD-10-PCS | Mod: S$PBB,,, | Performed by: ORTHOPAEDIC SURGERY

## 2021-02-05 PROCEDURE — 99999 PR PBB SHADOW E&M-EST. PATIENT-LVL II: ICD-10-PCS | Mod: PBBFAC,,, | Performed by: ORTHOPAEDIC SURGERY

## 2021-02-15 ENCOUNTER — PATIENT MESSAGE (OUTPATIENT)
Dept: ALLERGY | Facility: CLINIC | Age: 36
End: 2021-02-15

## 2021-02-15 ENCOUNTER — PATIENT MESSAGE (OUTPATIENT)
Dept: SPORTS MEDICINE | Facility: CLINIC | Age: 36
End: 2021-02-15

## 2021-02-17 ENCOUNTER — TELEPHONE (OUTPATIENT)
Dept: SPORTS MEDICINE | Facility: CLINIC | Age: 36
End: 2021-02-17

## 2021-03-02 ENCOUNTER — HOSPITAL ENCOUNTER (OUTPATIENT)
Dept: RADIOLOGY | Facility: HOSPITAL | Age: 36
Discharge: HOME | End: 2021-03-02
Attending: ORTHOPAEDIC SURGERY
Payer: MEDICAID

## 2021-03-02 DIAGNOSIS — M25.512 CHRONIC LEFT SHOULDER PAIN: ICD-10-CM

## 2021-03-02 DIAGNOSIS — G89.29 CHRONIC LEFT SHOULDER PAIN: ICD-10-CM

## 2021-03-02 PROCEDURE — 73221 MRI JOINT UPR EXTREM W/O DYE: CPT | Mod: TC,PO,LT

## 2021-03-04 ENCOUNTER — PATIENT MESSAGE (OUTPATIENT)
Dept: SPORTS MEDICINE | Facility: CLINIC | Age: 36
End: 2021-03-04

## 2021-03-05 ENCOUNTER — TELEPHONE (OUTPATIENT)
Dept: SPORTS MEDICINE | Facility: CLINIC | Age: 36
End: 2021-03-05

## 2021-03-05 DIAGNOSIS — M25.511 RIGHT SHOULDER PAIN, UNSPECIFIED CHRONICITY: Primary | ICD-10-CM

## 2021-03-09 ENCOUNTER — TELEPHONE (OUTPATIENT)
Dept: SPORTS MEDICINE | Facility: CLINIC | Age: 36
End: 2021-03-09

## 2021-03-09 DIAGNOSIS — M25.512 LEFT SHOULDER PAIN, UNSPECIFIED CHRONICITY: Primary | ICD-10-CM

## 2021-03-12 ENCOUNTER — SPECIALTY PHARMACY (OUTPATIENT)
Dept: PHARMACY | Facility: CLINIC | Age: 36
End: 2021-03-12

## 2021-03-12 DIAGNOSIS — J45.50 SEVERE PERSISTENT ASTHMA, UNSPECIFIED WHETHER COMPLICATED: Primary | ICD-10-CM

## 2021-03-12 RX ORDER — BENRALIZUMAB 30 MG/ML
30 INJECTION, SOLUTION SUBCUTANEOUS
Qty: 3 SYRINGE | Refills: 0 | OUTPATIENT
Start: 2021-03-12 | End: 2021-04-05

## 2021-03-15 ENCOUNTER — TELEPHONE (OUTPATIENT)
Dept: ALLERGY | Facility: CLINIC | Age: 36
End: 2021-03-15

## 2021-03-15 ENCOUNTER — PATIENT MESSAGE (OUTPATIENT)
Dept: ALLERGY | Facility: CLINIC | Age: 36
End: 2021-03-15

## 2021-03-24 DIAGNOSIS — J45.40 MODERATE PERSISTENT ASTHMA, UNSPECIFIED WHETHER COMPLICATED: Primary | ICD-10-CM

## 2021-03-24 RX ORDER — BUDESONIDE AND FORMOTEROL FUMARATE DIHYDRATE 160; 4.5 UG/1; UG/1
2 AEROSOL RESPIRATORY (INHALATION) 2 TIMES DAILY PRN
Qty: 1 INHALER | Refills: 3 | Status: SHIPPED | OUTPATIENT
Start: 2021-03-24 | End: 2021-06-21

## 2021-03-25 ENCOUNTER — PATIENT MESSAGE (OUTPATIENT)
Dept: ALLERGY | Facility: CLINIC | Age: 36
End: 2021-03-25

## 2021-03-25 DIAGNOSIS — J45.40 MODERATE PERSISTENT ASTHMA, UNSPECIFIED WHETHER COMPLICATED: Primary | ICD-10-CM

## 2021-03-29 ENCOUNTER — LAB VISIT (OUTPATIENT)
Dept: LAB | Facility: HOSPITAL | Age: 36
End: 2021-03-29
Attending: STUDENT IN AN ORGANIZED HEALTH CARE EDUCATION/TRAINING PROGRAM
Payer: MEDICAID

## 2021-03-29 DIAGNOSIS — J45.40 MODERATE PERSISTENT ASTHMA, UNSPECIFIED WHETHER COMPLICATED: ICD-10-CM

## 2021-03-29 LAB
BASOPHILS # BLD AUTO: 0.03 K/UL (ref 0–0.2)
BASOPHILS NFR BLD: 0.3 % (ref 0–1.9)
DIFFERENTIAL METHOD: NORMAL
EOSINOPHIL # BLD AUTO: 0.4 K/UL (ref 0–0.5)
EOSINOPHIL NFR BLD: 4.5 % (ref 0–8)
ERYTHROCYTE [DISTWIDTH] IN BLOOD BY AUTOMATED COUNT: 14.3 % (ref 11.5–14.5)
HCT VFR BLD AUTO: 40.9 % (ref 37–48.5)
HGB BLD-MCNC: 13.1 G/DL (ref 12–16)
IMM GRANULOCYTES # BLD AUTO: 0.04 K/UL (ref 0–0.04)
IMM GRANULOCYTES NFR BLD AUTO: 0.5 % (ref 0–0.5)
LYMPHOCYTES # BLD AUTO: 1.9 K/UL (ref 1–4.8)
LYMPHOCYTES NFR BLD: 21.3 % (ref 18–48)
MCH RBC QN AUTO: 28.8 PG (ref 27–31)
MCHC RBC AUTO-ENTMCNC: 32 G/DL (ref 32–36)
MCV RBC AUTO: 90 FL (ref 82–98)
MONOCYTES # BLD AUTO: 0.8 K/UL (ref 0.3–1)
MONOCYTES NFR BLD: 8.6 % (ref 4–15)
NEUTROPHILS # BLD AUTO: 5.8 K/UL (ref 1.8–7.7)
NEUTROPHILS NFR BLD: 64.8 % (ref 38–73)
NRBC BLD-RTO: 0 /100 WBC
PLATELET # BLD AUTO: 278 K/UL (ref 150–450)
PMV BLD AUTO: 11.8 FL (ref 9.2–12.9)
RBC # BLD AUTO: 4.55 M/UL (ref 4–5.4)
WBC # BLD AUTO: 8.87 K/UL (ref 3.9–12.7)

## 2021-03-29 PROCEDURE — 85025 COMPLETE CBC W/AUTO DIFF WBC: CPT | Mod: PO | Performed by: STUDENT IN AN ORGANIZED HEALTH CARE EDUCATION/TRAINING PROGRAM

## 2021-03-29 PROCEDURE — 36415 COLL VENOUS BLD VENIPUNCTURE: CPT | Mod: PO | Performed by: STUDENT IN AN ORGANIZED HEALTH CARE EDUCATION/TRAINING PROGRAM

## 2021-03-30 ENCOUNTER — PATIENT MESSAGE (OUTPATIENT)
Dept: GASTROENTEROLOGY | Facility: CLINIC | Age: 36
End: 2021-03-30

## 2021-04-04 ENCOUNTER — PATIENT MESSAGE (OUTPATIENT)
Dept: ALLERGY | Facility: CLINIC | Age: 36
End: 2021-04-04

## 2021-04-05 ENCOUNTER — PATIENT MESSAGE (OUTPATIENT)
Dept: ALLERGY | Facility: CLINIC | Age: 36
End: 2021-04-05

## 2021-04-06 ENCOUNTER — PATIENT MESSAGE (OUTPATIENT)
Dept: ALLERGY | Facility: CLINIC | Age: 36
End: 2021-04-06

## 2021-04-06 DIAGNOSIS — L29.9 ITCHING: Primary | ICD-10-CM

## 2021-04-22 ENCOUNTER — OFFICE VISIT (OUTPATIENT)
Dept: ALLERGY | Facility: CLINIC | Age: 36
End: 2021-04-22
Payer: MEDICAID

## 2021-04-22 ENCOUNTER — LAB VISIT (OUTPATIENT)
Dept: LAB | Facility: HOSPITAL | Age: 36
End: 2021-04-22
Attending: STUDENT IN AN ORGANIZED HEALTH CARE EDUCATION/TRAINING PROGRAM
Payer: MEDICAID

## 2021-04-22 VITALS — BODY MASS INDEX: 48.82 KG/M2 | WEIGHT: 293 LBS | HEIGHT: 65 IN

## 2021-04-22 DIAGNOSIS — J30.81 ALLERGIC RHINITIS DUE TO ANIMAL (CAT) (DOG) HAIR AND DANDER: ICD-10-CM

## 2021-04-22 DIAGNOSIS — L29.9 ITCHING: Primary | ICD-10-CM

## 2021-04-22 DIAGNOSIS — L29.9 ITCHING: ICD-10-CM

## 2021-04-22 DIAGNOSIS — R06.02 SHORTNESS OF BREATH: ICD-10-CM

## 2021-04-22 DIAGNOSIS — J30.1 NON-SEASONAL ALLERGIC RHINITIS DUE TO POLLEN: ICD-10-CM

## 2021-04-22 DIAGNOSIS — J30.89 ALLERGIC RHINITIS DUE TO HOUSE DUST MITE: ICD-10-CM

## 2021-04-22 DIAGNOSIS — J30.1 SEASONAL ALLERGIC RHINITIS DUE TO POLLEN: ICD-10-CM

## 2021-04-22 PROCEDURE — 99214 PR OFFICE/OUTPT VISIT, EST, LEVL IV, 30-39 MIN: ICD-10-PCS | Mod: S$PBB,,, | Performed by: STUDENT IN AN ORGANIZED HEALTH CARE EDUCATION/TRAINING PROGRAM

## 2021-04-22 PROCEDURE — 86003 ALLG SPEC IGE CRUDE XTRC EA: CPT | Performed by: STUDENT IN AN ORGANIZED HEALTH CARE EDUCATION/TRAINING PROGRAM

## 2021-04-22 PROCEDURE — 36415 COLL VENOUS BLD VENIPUNCTURE: CPT | Performed by: STUDENT IN AN ORGANIZED HEALTH CARE EDUCATION/TRAINING PROGRAM

## 2021-04-22 PROCEDURE — 99999 PR PBB SHADOW E&M-EST. PATIENT-LVL III: CPT | Mod: PBBFAC,,, | Performed by: STUDENT IN AN ORGANIZED HEALTH CARE EDUCATION/TRAINING PROGRAM

## 2021-04-22 PROCEDURE — 99999 PR PBB SHADOW E&M-EST. PATIENT-LVL III: ICD-10-PCS | Mod: PBBFAC,,, | Performed by: STUDENT IN AN ORGANIZED HEALTH CARE EDUCATION/TRAINING PROGRAM

## 2021-04-22 PROCEDURE — 99214 OFFICE O/P EST MOD 30 MIN: CPT | Mod: S$PBB,,, | Performed by: STUDENT IN AN ORGANIZED HEALTH CARE EDUCATION/TRAINING PROGRAM

## 2021-04-22 PROCEDURE — 86003 ALLG SPEC IGE CRUDE XTRC EA: CPT | Mod: 59 | Performed by: STUDENT IN AN ORGANIZED HEALTH CARE EDUCATION/TRAINING PROGRAM

## 2021-04-22 PROCEDURE — 99213 OFFICE O/P EST LOW 20 MIN: CPT | Mod: PBBFAC | Performed by: STUDENT IN AN ORGANIZED HEALTH CARE EDUCATION/TRAINING PROGRAM

## 2021-04-22 RX ORDER — HYDROXYZINE HYDROCHLORIDE 25 MG/1
TABLET, FILM COATED ORAL
Qty: 240 TABLET | Refills: 3 | Status: SHIPPED | OUTPATIENT
Start: 2021-04-22 | End: 2023-02-03 | Stop reason: SDUPTHER

## 2021-04-26 LAB
APPLE IGE QN: 0.42 KU/L
AVOCADO IGE QN: 0.63 KU/L
BANANA IGE QN: 1.2 KU/L
BEEF IGE QN: 0.11 KU/L
BROCCOLI IGE QN: 0.36 KU/L
CARROT IGE QN: 1.2 KU/L
CATFISH IGE QN: <0.1 KU/L
CELERY IGE QN: 1.07 KU/L
CHICKEN CLASS: NORMAL
CHICKEN IGE QN: <0.1 KU/L
CODFISH IGE QN: <0.1 KU/L
CORN IGE QN: 0.49 KU/L
CRAB IGE QN: <0.1 KU/L
CRAWFISH IGE QN: <0.1 KU/L
CUCUMBER IGE QN: 0.66 KU/L
DEPRECATED APPLE IGE RAST QL: ABNORMAL
DEPRECATED AVOCADO IGE RAST QL: ABNORMAL
DEPRECATED BANANA IGE RAST QL: ABNORMAL
DEPRECATED BEEF IGE RAST QL: ABNORMAL
DEPRECATED BROCCOLI IGE RAST QL: ABNORMAL
DEPRECATED CARROT IGE RAST QL: ABNORMAL
DEPRECATED CATFISH IGE RAST QL: NORMAL
DEPRECATED CELERY IGE RAST QL: ABNORMAL
DEPRECATED CODFISH IGE RAST QL: NORMAL
DEPRECATED CORN IGE RAST QL: ABNORMAL
DEPRECATED CRAB IGE RAST QL: NORMAL
DEPRECATED CRAWFISH IGE RAST QL: NORMAL
DEPRECATED CUCUMBER IGE RAST QL: ABNORMAL
DEPRECATED FLOUNDER IGE RAST QL: NORMAL
DEPRECATED GARLIC IGE RAST QL: ABNORMAL
DEPRECATED GRAPE IGE RAST QL: ABNORMAL
DEPRECATED GREEN BEAN IGE RAST QL: ABNORMAL
DEPRECATED GREEN PEPPER IGE RAST QL: NORMAL
DEPRECATED LOBSTER IGE RAST QL: NORMAL
DEPRECATED ONION IGE RAST QL: ABNORMAL
DEPRECATED ORANGE IGE RAST QL: ABNORMAL
DEPRECATED PEACH IGE RAST QL: ABNORMAL
DEPRECATED PEAR IGE RAST QL: ABNORMAL
DEPRECATED PINEAPPLE IGE RAST QL: ABNORMAL
DEPRECATED PORK IGE RAST QL: ABNORMAL
DEPRECATED POTATO IGE RAST QL: ABNORMAL
DEPRECATED RICE IGE RAST QL: ABNORMAL
DEPRECATED SALMON IGE RAST QL: NORMAL
DEPRECATED SHRIMP IGE RAST QL: NORMAL
DEPRECATED SPINACH IGE RAST QL: ABNORMAL
DEPRECATED STRAWBERRY IGE RAST QL: ABNORMAL
DEPRECATED TILAPIA IGE RAST QL: NORMAL
DEPRECATED TOMATO IGE RAST QL: ABNORMAL
DEPRECATED TROUT IGE RAST QL: NORMAL
DEPRECATED TUNA IGE RAST QL: NORMAL
DEPRECATED WATERMELON IGE RAST QL: ABNORMAL
FLOUNDER IGE QN: <0.1 KU/L
GARLIC IGE QN: 0.71 KU/L
GRAPE IGE QN: 0.86 KU/L
GREEN BEAN IGE QN: 0.49 KU/L
GREEN PEPPER IGE QN: <0.1 KU/L
LOBSTER IGE QN: <0.1 KU/L
ONION IGE QN: 0.89 KU/L
ORANGE IGE QN: 0.73 KU/L
PEACH IGE QN: 0.65 KU/L
PEAR IGE QN: 0.41 KU/L
PINEAPPLE IGE QN: 0.86 KU/L
PORK IGE QN: 2.33 KU/L
POTATO IGE QN: 0.9 KU/L
RICE IGE QN: 0.59 KU/L
SALMON IGE QN: <0.1 KU/L
SHRIMP IGE QN: <0.1 KU/L
SPINACH IGE QN: 0.86 KU/L
STRAWBERRY IGE QN: 0.43 KU/L
TILAPIA IGE QN: <0.1 KU/L
TOMATO IGE QN: 0.93 KU/L
TROUT IGE QN: <0.1 KU/L
TUNA IGE QN: <0.1 KU/L
WATERMELON IGE QN: 0.64 KU/L

## 2021-04-27 ENCOUNTER — PATIENT MESSAGE (OUTPATIENT)
Dept: ALLERGY | Facility: CLINIC | Age: 36
End: 2021-04-27

## 2021-04-28 ENCOUNTER — TELEPHONE (OUTPATIENT)
Dept: ALLERGY | Facility: CLINIC | Age: 36
End: 2021-04-28

## 2021-04-28 LAB
ALLERGEN EGGPLANT, IGE: 2
BOG CRANBERRY IGE AB [UNITS/VOLUME] IN SERUM: 0.12 KU/L
DEPRECATED CRANBERRY IGE RAST QL: ABNORMAL
EGGPLANT IGE QN: 0.72 KU/L

## 2021-04-29 ENCOUNTER — OFFICE VISIT (OUTPATIENT)
Dept: ALLERGY | Facility: CLINIC | Age: 36
End: 2021-04-29
Payer: MEDICAID

## 2021-04-29 DIAGNOSIS — L29.9 ITCHING: ICD-10-CM

## 2021-04-29 DIAGNOSIS — T78.2XXD ANAPHYLAXIS, SUBSEQUENT ENCOUNTER: ICD-10-CM

## 2021-04-29 DIAGNOSIS — R89.9 ABNORMAL LABORATORY TEST RESULT: ICD-10-CM

## 2021-04-29 DIAGNOSIS — Z91.02 FOOD ADDITIVES ALLERGY STATUS: Primary | ICD-10-CM

## 2021-04-29 DIAGNOSIS — J45.40 MODERATE PERSISTENT ASTHMA, UNSPECIFIED WHETHER COMPLICATED: ICD-10-CM

## 2021-04-29 PROCEDURE — 99214 OFFICE O/P EST MOD 30 MIN: CPT | Mod: 95,,, | Performed by: STUDENT IN AN ORGANIZED HEALTH CARE EDUCATION/TRAINING PROGRAM

## 2021-04-29 PROCEDURE — 99214 PR OFFICE/OUTPT VISIT, EST, LEVL IV, 30-39 MIN: ICD-10-PCS | Mod: 95,,, | Performed by: STUDENT IN AN ORGANIZED HEALTH CARE EDUCATION/TRAINING PROGRAM

## 2021-06-17 ENCOUNTER — PATIENT MESSAGE (OUTPATIENT)
Dept: ALLERGY | Facility: CLINIC | Age: 36
End: 2021-06-17

## 2021-08-10 ENCOUNTER — TELEPHONE (OUTPATIENT)
Dept: SPORTS MEDICINE | Facility: CLINIC | Age: 36
End: 2021-08-10

## 2021-08-11 ENCOUNTER — PATIENT MESSAGE (OUTPATIENT)
Dept: ORTHOPEDICS | Facility: CLINIC | Age: 36
End: 2021-08-11

## 2021-08-17 ENCOUNTER — PATIENT MESSAGE (OUTPATIENT)
Dept: ALLERGY | Facility: CLINIC | Age: 36
End: 2021-08-17

## 2021-08-26 ENCOUNTER — TELEPHONE (OUTPATIENT)
Dept: ALLERGY | Facility: CLINIC | Age: 36
End: 2021-08-26

## 2021-09-22 ENCOUNTER — PATIENT MESSAGE (OUTPATIENT)
Dept: ORTHOPEDICS | Facility: CLINIC | Age: 36
End: 2021-09-22

## 2021-11-09 ENCOUNTER — HOSPITAL ENCOUNTER (EMERGENCY)
Facility: HOSPITAL | Age: 36
Discharge: HOME | End: 2021-11-09
Attending: EMERGENCY MEDICINE
Payer: MEDICAID

## 2021-11-09 VITALS
RESPIRATION RATE: 18 BRPM | SYSTOLIC BLOOD PRESSURE: 142 MMHG | OXYGEN SATURATION: 99 % | DIASTOLIC BLOOD PRESSURE: 72 MMHG | TEMPERATURE: 98 F | HEIGHT: 62 IN | BODY MASS INDEX: 53.92 KG/M2 | HEART RATE: 86 BPM | WEIGHT: 293 LBS

## 2021-11-09 DIAGNOSIS — M79.89 LEG SWELLING: Primary | ICD-10-CM

## 2021-11-09 PROCEDURE — 63600175 PHARM REV CODE 636 W HCPCS: Mod: ER | Performed by: EMERGENCY MEDICINE

## 2021-11-09 PROCEDURE — 99284 EMERGENCY DEPT VISIT MOD MDM: CPT | Mod: 25,ER

## 2021-11-09 PROCEDURE — 96372 THER/PROPH/DIAG INJ SC/IM: CPT | Mod: ER

## 2021-11-09 RX ORDER — DEXAMETHASONE SODIUM PHOSPHATE 4 MG/ML
8 INJECTION, SOLUTION INTRA-ARTICULAR; INTRALESIONAL; INTRAMUSCULAR; INTRAVENOUS; SOFT TISSUE
Status: COMPLETED | OUTPATIENT
Start: 2021-11-09 | End: 2021-11-09

## 2021-11-09 RX ORDER — PREDNISONE 20 MG/1
40 TABLET ORAL DAILY
Qty: 12 TABLET | Refills: 0 | Status: SHIPPED | OUTPATIENT
Start: 2021-11-09 | End: 2021-11-12

## 2021-11-09 RX ADMIN — DEXAMETHASONE SODIUM PHOSPHATE 8 MG: 4 INJECTION, SOLUTION INTRA-ARTICULAR; INTRALESIONAL; INTRAMUSCULAR; INTRAVENOUS; SOFT TISSUE at 05:11

## 2021-12-28 ENCOUNTER — PATIENT MESSAGE (OUTPATIENT)
Dept: ORTHOPEDICS | Facility: CLINIC | Age: 36
End: 2021-12-28
Payer: MEDICAID

## 2022-01-03 ENCOUNTER — LAB VISIT (OUTPATIENT)
Dept: PRIMARY CARE CLINIC | Facility: OTHER | Age: 37
End: 2022-01-03
Attending: INTERNAL MEDICINE
Payer: MEDICAID

## 2022-01-03 DIAGNOSIS — U07.1 COVID-19: Primary | ICD-10-CM

## 2022-01-03 LAB
CTP QC/QA: YES
SARS-COV-2 AG RESP QL IA.RAPID: NEGATIVE

## 2022-01-03 PROCEDURE — 87811 SARS CORONAVIRUS 2 ANTIGEN POCT, MANUAL READ: ICD-10-PCS | Mod: ,,, | Performed by: INTERNAL MEDICINE

## 2022-01-03 PROCEDURE — 87811 SARS-COV-2 COVID19 W/OPTIC: CPT | Mod: ,,, | Performed by: INTERNAL MEDICINE

## 2022-01-04 NOTE — PROGRESS NOTES
Nasal specimen collected.   BinaxNOW test performed in the presence of patient and results loaded into EPIC. Pt instructed with following instructions:.  Instructions for Patients with Confirmed or Suspected COVID-19    If you are awaiting your test result, you will either be called or it will be released to the patient portal.  If you have any questions about your test, please visit [email or web address] or call our COVID-19 information line at [phone].      Please isolate yourself at home.  You may leave home and/or return to work once the following conditions are met:    If you were not hospitalized and are not severely immunocompromised*:   More than 10 days since symptoms first appeared AND   More than 24 hours fever free without medications AND   Symptoms have improved     If you were hospitalized OR are severely immunocompromised*:   More than 20 days since symptoms first appeared   More than 24 hours fever free without medications   Symptoms have improved    If you had no symptoms but tested positive:   More than 10 days since the date of the first positive test (20 days if severely immunocompromised).   If you develop symptoms, then use the guidelines above.     *Definition of severely immunocompromised:  - Current chemotherapy for cancer  - Untreated HIV with CD4 count less than 200  - Combined primary immunodeficiency disorder  - Prednisone more than 20 mg per day for more than 14 days  - Post-transplant patients

## 2022-01-09 ENCOUNTER — PATIENT MESSAGE (OUTPATIENT)
Dept: ADMINISTRATIVE | Facility: OTHER | Age: 37
End: 2022-01-09
Payer: MEDICAID

## 2022-01-12 ENCOUNTER — PATIENT MESSAGE (OUTPATIENT)
Dept: ORTHOPEDICS | Facility: CLINIC | Age: 37
End: 2022-01-12
Payer: MEDICAID

## 2022-04-24 ENCOUNTER — PATIENT MESSAGE (OUTPATIENT)
Dept: GASTROENTEROLOGY | Facility: CLINIC | Age: 37
End: 2022-04-24
Payer: MEDICAID

## 2022-05-02 ENCOUNTER — TELEPHONE (OUTPATIENT)
Dept: BARIATRICS | Facility: CLINIC | Age: 37
End: 2022-05-02
Payer: MEDICAID

## 2022-05-02 NOTE — TELEPHONE ENCOUNTER
Called and spoke to pt. Pt stated she was interested in an appt for surgical workup, but wanted to know her financial responsibilities first. Explained to pt that she would need a FC appt as the first step anyway. Offered first available on 5/25/22 at 0900. Pt agreed. Appt scheduled.

## 2022-05-17 ENCOUNTER — TELEPHONE (OUTPATIENT)
Dept: ENDOCRINOLOGY | Facility: CLINIC | Age: 37
End: 2022-05-17
Payer: MEDICAID

## 2022-05-17 NOTE — TELEPHONE ENCOUNTER
----- Message from Harman Velasco sent at 5/17/2022  8:09 AM CDT -----  Contact: pt  Pt requesting back RE: scheduling appt for parathyroidism denied scheduling.       Confirmed contact below:  Contact Name:June Reyes  Phone Number: [phone]

## 2022-07-11 ENCOUNTER — HOSPITAL ENCOUNTER (OUTPATIENT)
Dept: RADIOLOGY | Facility: HOSPITAL | Age: 37
Discharge: HOME | End: 2022-07-11
Attending: NURSE PRACTITIONER
Payer: MEDICAID

## 2022-07-11 ENCOUNTER — PATIENT MESSAGE (OUTPATIENT)
Dept: GASTROENTEROLOGY | Facility: CLINIC | Age: 37
End: 2022-07-11

## 2022-07-11 ENCOUNTER — OFFICE VISIT (OUTPATIENT)
Dept: GASTROENTEROLOGY | Facility: CLINIC | Age: 37
End: 2022-07-11
Payer: MEDICAID

## 2022-07-11 VITALS
SYSTOLIC BLOOD PRESSURE: 132 MMHG | HEART RATE: 88 BPM | WEIGHT: 293 LBS | HEIGHT: 62 IN | DIASTOLIC BLOOD PRESSURE: 70 MMHG | BODY MASS INDEX: 53.92 KG/M2

## 2022-07-11 DIAGNOSIS — E66.01 MORBID OBESITY WITH BMI OF 50.0-59.9, ADULT: ICD-10-CM

## 2022-07-11 DIAGNOSIS — R10.84 GENERALIZED ABDOMINAL PAIN: ICD-10-CM

## 2022-07-11 DIAGNOSIS — K58.1 IRRITABLE BOWEL SYNDROME WITH CONSTIPATION: ICD-10-CM

## 2022-07-11 DIAGNOSIS — R10.84 GENERALIZED ABDOMINAL PAIN: Primary | ICD-10-CM

## 2022-07-11 PROCEDURE — 1160F PR REVIEW ALL MEDS BY PRESCRIBER/CLIN PHARMACIST DOCUMENTED: ICD-10-PCS | Mod: CPTII,,, | Performed by: NURSE PRACTITIONER

## 2022-07-11 PROCEDURE — 1160F RVW MEDS BY RX/DR IN RCRD: CPT | Mod: CPTII,,, | Performed by: NURSE PRACTITIONER

## 2022-07-11 PROCEDURE — 99999 PR PBB SHADOW E&M-EST. PATIENT-LVL III: ICD-10-PCS | Mod: PBBFAC,,, | Performed by: NURSE PRACTITIONER

## 2022-07-11 PROCEDURE — 99214 PR OFFICE/OUTPT VISIT, EST, LEVL IV, 30-39 MIN: ICD-10-PCS | Mod: S$PBB,,, | Performed by: NURSE PRACTITIONER

## 2022-07-11 PROCEDURE — 3075F SYST BP GE 130 - 139MM HG: CPT | Mod: CPTII,,, | Performed by: NURSE PRACTITIONER

## 2022-07-11 PROCEDURE — 3075F PR MOST RECENT SYSTOLIC BLOOD PRESS GE 130-139MM HG: ICD-10-PCS | Mod: CPTII,,, | Performed by: NURSE PRACTITIONER

## 2022-07-11 PROCEDURE — 3078F PR MOST RECENT DIASTOLIC BLOOD PRESSURE < 80 MM HG: ICD-10-PCS | Mod: CPTII,,, | Performed by: NURSE PRACTITIONER

## 2022-07-11 PROCEDURE — 3008F BODY MASS INDEX DOCD: CPT | Mod: CPTII,,, | Performed by: NURSE PRACTITIONER

## 2022-07-11 PROCEDURE — 76705 ECHO EXAM OF ABDOMEN: CPT | Mod: TC,PO

## 2022-07-11 PROCEDURE — 99999 PR PBB SHADOW E&M-EST. PATIENT-LVL III: CPT | Mod: PBBFAC,,, | Performed by: NURSE PRACTITIONER

## 2022-07-11 PROCEDURE — 1159F PR MEDICATION LIST DOCUMENTED IN MEDICAL RECORD: ICD-10-PCS | Mod: CPTII,,, | Performed by: NURSE PRACTITIONER

## 2022-07-11 PROCEDURE — 3078F DIAST BP <80 MM HG: CPT | Mod: CPTII,,, | Performed by: NURSE PRACTITIONER

## 2022-07-11 PROCEDURE — 1159F MED LIST DOCD IN RCRD: CPT | Mod: CPTII,,, | Performed by: NURSE PRACTITIONER

## 2022-07-11 PROCEDURE — 99213 OFFICE O/P EST LOW 20 MIN: CPT | Mod: PBBFAC,25,PO | Performed by: NURSE PRACTITIONER

## 2022-07-11 PROCEDURE — 74019 RADEX ABDOMEN 2 VIEWS: CPT | Mod: TC,FY,PO

## 2022-07-11 PROCEDURE — 99214 OFFICE O/P EST MOD 30 MIN: CPT | Mod: S$PBB,,, | Performed by: NURSE PRACTITIONER

## 2022-07-11 PROCEDURE — 3008F PR BODY MASS INDEX (BMI) DOCUMENTED: ICD-10-PCS | Mod: CPTII,,, | Performed by: NURSE PRACTITIONER

## 2022-07-11 RX ORDER — DICYCLOMINE HYDROCHLORIDE 20 MG/1
20 TABLET ORAL 3 TIMES DAILY PRN
Qty: 60 TABLET | Refills: 2 | Status: SHIPPED | OUTPATIENT
Start: 2022-07-11 | End: 2023-02-03

## 2022-07-11 RX ORDER — DULAGLUTIDE 0.75 MG/.5ML
INJECTION, SOLUTION SUBCUTANEOUS
COMMUNITY
End: 2023-02-03

## 2022-07-21 ENCOUNTER — PATIENT MESSAGE (OUTPATIENT)
Dept: GASTROENTEROLOGY | Facility: CLINIC | Age: 37
End: 2022-07-21
Payer: MEDICAID

## 2022-07-21 NOTE — PROGRESS NOTES
Subjective:       Patient ID: June Reyes is a 37 y.o. female.    Chief Complaint: Abdominal Pain and Constipation    38 y/o female with hx of GERD and constipation presents to clinic with c/o abdominal pain constipation. Patient is new me; last seen by Dr. Brenner in 2020.    Old records from chart reviewed and summarized, significant for:  - 7/21/2020 colonoscopy: Internal hemorrhoids, likely source for rectal bleeding; examined portion of the ileum was normal; entire examined colonic mucosa is normal; diverticulosis in the sigmoid colon.  - 1/02/2020 EGD: A single plaque in the upper third of the esophagus. Biopsied. Endoscopically this is consistent with an inlet patch. Two mucosal nodules found in the esophagus. Removed with biopsy forceps. Tortuous and baggy esophagus.    - Hiatal hernia. Erythematous mucosa in the antrum. Biopsies (-) EoE and HP.    Abdominal Pain  This is a recurrent problem. The current episode started more than 1 month ago. The onset quality is gradual. The problem occurs intermittently. The problem has been waxing and waning. The pain is located in the generalized abdominal region. The quality of the pain is aching and cramping. Associated symptoms include constipation. Nothing aggravates the pain. The pain is relieved by nothing. She has tried nothing for the symptoms. Her past medical history is significant for GERD.       Past Medical History:   Diagnosis Date    Allergy     Anxiety     BMI 50.0-59.9, adult     Female infertility 2/16/2017 9:50:56 AM    [facility] - [facility]: Infertility, Unspecified-No Additional Notes    GERD (gastroesophageal reflux disease)     Hypoglycemic disorder     Obesity     Pure hypercholesterolemia 2/16/2017 9:50:50 AM    [facility] - Unknown: High cholesterol-No Additional Notes    Thyroid disease     Vertigo        Past Surgical History:   Procedure Laterality Date    ADENOIDECTOMY      ARTHROSCOPIC CHONDROPLASTY OF KNEE  JOINT Left 2019    Procedure: ARTHROSCOPY, KNEE, WITH CHONDROPLASTY;  Surgeon: Sravan Antonio MD;  Location: [facility] OR;  Service: Orthopedics;  Laterality: Left;    ARTHROSCOPIC DEBRIDEMENT OF SHOULDER Right 2019    Procedure: DEBRIDEMENT, SHOULDER, ARTHROSCOPIC;  Surgeon: Sravan Antonio MD;  Location: [facility] OR;  Service: Orthopedics;  Laterality: Right;  labral debridement    ARTHROSCOPIC DEBRIDEMENT OF SHOULDER Right 10/16/2020    Procedure: DEBRIDEMENT, SHOULDER, ARTHROSCOPIC;  Surgeon: Sravan Antonio MD;  Location: [facility] OR;  Service: Orthopedics;  Laterality: Right;    ARTHROSCOPIC REPAIR OF ROTATOR CUFF OF SHOULDER Right 2019    Procedure: REPAIR, ROTATOR CUFF, ARTHROSCOPIC;  Surgeon: Sravan Antonio MD;  Location: [facility] OR;  Service: Orthopedics;  Laterality: Right;  subscapularis  Regeneten implant    ARTHROSCOPY OF KNEE Left 2019    Procedure: ARTHROSCOPY, KNEE;  Surgeon: Sravan Antonio MD;  Location: [facility] OR;  Service: Orthopedics;  Laterality: Left;  regional w/o catheter / adductor     ARTHROSCOPY OF SHOULDER WITH DECOMPRESSION OF SUBACROMIAL SPACE Right 2019    Procedure: ARTHROSCOPY, SHOULDER, WITH SUBACROMIAL SPACE DECOMPRESSION;  Surgeon: Sravan Antonio MD;  Location: [facility] OR;  Service: Orthopedics;  Laterality: Right;    ARTHROSCOPY OF SHOULDER WITH DECOMPRESSION OF SUBACROMIAL SPACE Right 10/16/2020    Procedure: ARTHROSCOPY, SHOULDER, WITH SUBACROMIAL SPACE DECOMPRESSION;  Surgeon: Sravan Antonio MD;  Location: [facility] OR;  Service: Orthopedics;  Laterality: Right;    ARTHROSCOPY OF SHOULDER WITH REMOVAL OF DISTAL CLAVICLE Right 10/16/2020    Procedure: ARTHROSCOPY, SHOULDER, WITH DISTAL CLAVICLE EXCISION;  Surgeon: Sravan Antonio MD;  Location: [facility] OR;  Service: Orthopedics;  Laterality: Right;  regional w/o catheter (interscalene)     SECTION      COLONOSCOPY N/A 2020    Procedure: COLONOSCOPY;  Surgeon:  Erinn Brenner MD;  Location: [facility] ENDO;  Service: Endoscopy;  Laterality: N/A;    DILATION AND CURETTAGE OF UTERUS      ESOPHAGOGASTRODUODENOSCOPY N/A 2020    Procedure: EGD (ESOPHAGOGASTRODUODENOSCOPY);  Surgeon: Erinn Brenner MD;  Location: [facility] ENDO;  Service: Endoscopy;  Laterality: N/A;    GANGLION CYST EXCISION      RECONSTRUCTION OF LIGAMENT Left 2019    Procedure: RECONSTRUCTION, LIGAMENT;  Surgeon: Sravan Antonio MD;  Location: [facility] OR;  Service: Orthopedics;  Laterality: Left;  ACL    REPAIR OF MENISCUS OF KNEE Left 2019    Procedure: REPAIR, MENISCUS, KNEE;  Surgeon: Sravan Antonio MD;  Location: [facility] OR;  Service: Orthopedics;  Laterality: Left;  lateral    TONSILLECTOMY         Family History   Problem Relation Age of Onset    Diabetes Mother     Hypertension Mother     Depression Mother     Achalasia Mother     Hypertension Father     Heart disease Father     Allergies Father     Cancer Maternal Aunt     Diabetes Paternal Grandfather     Autism Brother     Allergies Son     Allergic rhinitis Neg Hx     Angioedema Neg Hx     Asthma Neg Hx     Atopy Neg Hx     Eczema Neg Hx     Immunodeficiency Neg Hx     Rhinitis Neg Hx     Urticaria Neg Hx        Social History     Socioeconomic History    Marital status: Single   Tobacco Use    Smoking status: Former Smoker     Types: Cigarettes     Quit date: 2017     Years since quittin.4    Smokeless tobacco: Never Used   Substance and Sexual Activity    Alcohol use: Yes     Comment: socially    Drug use: No    Sexual activity: Yes     Partners: Male     Birth control/protection: Partner-Vasectomy       Review of Systems   Constitutional: Negative for appetite change and unexpected weight change.   Respiratory: Negative for shortness of breath.    Cardiovascular: Negative for chest pain.   Gastrointestinal: Positive for abdominal pain and constipation.   Musculoskeletal: Negative for  "back pain.   Neurological: Negative for dizziness and weakness.   Hematological: Negative for adenopathy. Does not bruise/bleed easily.         Objective:     Vitals:    07/11/22 1327   BP: 132/70   BP Location: Left arm   Pulse: 88   Weight: 134 kg (295 lb 8 oz)   Height: 5' 2" (1.575 m)          Physical Exam  Constitutional:       General: She is not in acute distress.     Appearance: She is obese. She is not ill-appearing.   HENT:      Head: Normocephalic.   Eyes:      Conjunctiva/sclera: Conjunctivae normal.      Pupils: Pupils are equal, round, and reactive to light.   Pulmonary:      Effort: Pulmonary effort is normal. No respiratory distress.   Musculoskeletal:      Cervical back: Normal range of motion.   Skin:     General: Skin is warm and dry.   Neurological:      Mental Status: She is alert and oriented to person, place, and time.   Psychiatric:         Mood and Affect: Mood normal.         Behavior: Behavior normal.               Assessment:         ICD-10-CM ICD-9-CM   1. Generalized abdominal pain  R10.84 789.07   2. Morbid obesity with BMI of 50.0-59.9, adult  E66.01 278.01    Z68.43 V85.43   3. Irritable bowel syndrome with constipation  K58.1 564.1       Plan:       Generalized abdominal pain  -     X-Ray Abdomen Flat And Erect; Future; Expected date: 07/11/2022  -     dicyclomine (BENTYL) 20 mg tablet; Take 1 tablet (20 mg total) by mouth 3 (three) times daily as needed (abdominal pain).  Dispense: 60 tablet; Refill: 2  -     US Abdomen Limited; Future; Expected date: 07/11/2022    Morbid obesity with BMI of 50.0-59.9, adult       -     Weight loss advised. Dietary and        exercise counseling done.    Irritable bowel syndrome with constipation  -     linaCLOtide (LINZESS) 72 mcg Cap capsule; Take 1 capsule (72 mcg total) by mouth once daily.  Dispense: 30 capsule; Refill: 2      Follow up in about 4 weeks (around 8/8/2022) for If symptoms worsen or fail to improve, medication management. "     Patient's Medications   New Prescriptions    DICYCLOMINE (BENTYL) 20 MG TABLET    Take 1 tablet (20 mg total) by mouth 3 (three) times daily as needed (abdominal pain).    LINACLOTIDE (LINZESS) 72 MCG CAP CAPSULE    Take 1 capsule (72 mcg total) by mouth once daily.   Previous Medications    ALBUTEROL (PROVENTIL/VENTOLIN HFA) 90 MCG/ACTUATION INHALER    Inhale 1-2 puffs into the lungs every 6 (six) hours as needed for Wheezing or Shortness of Breath. Rescue    AMITRIPTYLINE (ELAVIL) 50 MG TABLET    Take 50 mg by mouth every evening.    DULAGLUTIDE (TRULICITY) 0.75 MG/0.5 ML PEN INJECTOR    Inject into the skin every 7 days.    EPINEPHRINE (EPIPEN) 0.3 MG/0.3 ML ATIN    Inject 0.3 mLs (0.3 mg total) into the muscle as needed.    HYDROXYZINE HCL (ATARAX) 25 MG TABLET    1 to 8 tablets at bedtime.  Start with 3 tablets.  Increase or decrease as needed until itching is controlled or a maximum of 8 tablets.    LACTOBACILLUS RHAMNOSUS GG (CULTURELLE) 10 BILLION CELL CAPSULE    Take 1 capsule by mouth daily as needed (stomach upset).    LEVOCETIRIZINE (XYZAL) 5 MG TABLET    TAKE 2 TABLETS BY MOUTH EVERY DAY    MULTIVITAMIN WITH MINERALS TABLET    Take 1 tablet by mouth once daily.    ONDANSETRON (ZOFRAN-ODT) 8 MG TBDL    Take 8 mg by mouth every 6 (six) hours as needed (nausea).   Modified Medications    No medications on file   Discontinued Medications    DIPHENHYDRAMINE (BENADRYL) 50 MG CAPSULE    Take 1 capsule (50 mg total) by mouth every 6 (six) hours as needed for Itching or Allergies.    ESCITALOPRAM OXALATE (LEXAPRO) 20 MG TABLET    Take 40 mg by mouth once daily.    MEDROXYPROGESTERONE (DEPO-PROVERA) 150 MG/ML INJECTION    USE AS DIRECTED    OMEPRAZOLE (PRILOSEC) 40 MG CAPSULE    Take 40 mg by mouth once daily.    SUMATRIPTAN (IMITREX) 100 MG TABLET    Take 100 mg by mouth every 2 (two) hours as needed for Migraine.    SYMBICORT 160-4.5 MCG/ACTUATION HFAA    INHALE 2 PUFFS BY MOUTH TWICE DAILY AS NEEDED     TOLTERODINE (DETROL LA) 2 MG CP24    Take 4 mg by mouth once daily.

## 2022-07-29 ENCOUNTER — LAB VISIT (OUTPATIENT)
Dept: LAB | Facility: HOSPITAL | Age: 37
End: 2022-07-29
Attending: NURSE PRACTITIONER
Payer: MEDICAID

## 2022-07-29 DIAGNOSIS — R10.84 GENERALIZED ABDOMINAL PAIN: Primary | ICD-10-CM

## 2022-07-29 LAB
ALBUMIN SERPL BCP-MCNC: 4.4 G/DL (ref 3.5–5.2)
ALP SERPL-CCNC: 96 U/L (ref 38–126)
ALT SERPL W/O P-5'-P-CCNC: 17 U/L (ref 10–44)
AMYLASE SERPL-CCNC: 58 U/L (ref 30–110)
ANION GAP SERPL CALC-SCNC: 7 MMOL/L (ref 8–16)
AST SERPL-CCNC: 19 U/L (ref 15–46)
BILIRUB SERPL-MCNC: 0.6 MG/DL (ref 0.1–1)
CALCIUM SERPL-MCNC: 9.4 MG/DL (ref 8.7–10.5)
CHLORIDE SERPL-SCNC: 104 MMOL/L (ref 95–110)
CO2 SERPL-SCNC: 27 MMOL/L (ref 23–29)
CREAT SERPL-MCNC: 0.68 MG/DL (ref 0.5–1.4)
EST. GFR  (AFRICAN AMERICAN): >60 ML/MIN/1.73 M^2
EST. GFR  (NON AFRICAN AMERICAN): >60 ML/MIN/1.73 M^2
GLUCOSE SERPL-MCNC: 90 MG/DL (ref 70–110)
LIPASE SERPL-CCNC: 75 U/L (ref 23–300)
POTASSIUM SERPL-SCNC: 4 MMOL/L (ref 3.5–5.1)
PROT SERPL-MCNC: 7.4 G/DL (ref 6–8.4)
SODIUM SERPL-SCNC: 138 MMOL/L (ref 136–145)
UUN UR-MCNC: 10 MG/DL (ref 7–17)

## 2022-07-29 PROCEDURE — 80053 COMPREHEN METABOLIC PANEL: CPT | Mod: PO | Performed by: NURSE PRACTITIONER

## 2022-07-29 PROCEDURE — 87522 HEPATITIS C REVRS TRNSCRPJ: CPT | Mod: PO | Performed by: NURSE PRACTITIONER

## 2022-07-29 PROCEDURE — 83690 ASSAY OF LIPASE: CPT | Mod: PO | Performed by: NURSE PRACTITIONER

## 2022-07-29 PROCEDURE — 82150 ASSAY OF AMYLASE: CPT | Mod: PO | Performed by: NURSE PRACTITIONER

## 2022-07-30 LAB — HCV RNA SERPL NAA+PROBE-ACNC: NORMAL IU/ML

## 2022-09-15 DIAGNOSIS — M25.569 KNEE PAIN, UNSPECIFIED CHRONICITY, UNSPECIFIED LATERALITY: Primary | ICD-10-CM

## 2022-09-15 DIAGNOSIS — M25.519 SHOULDER PAIN, UNSPECIFIED CHRONICITY, UNSPECIFIED LATERALITY: ICD-10-CM

## 2022-09-15 NOTE — PROGRESS NOTES
"Subjective:      Patient ID: June Reyes is a 37 y.o. female.    Chief Complaint: Pain of the Left Shoulder, Pain of the Left Knee, Pain of the Right Knee, and Pain of the Right Shoulder      ABRAHAM  ([place])    Last seen by Dr. Antonio on 2/5/21 for left shoulder pain. History of right shoulder rotator cuff repair by him on 3/1/19 and repeat right shoulder scope by him on 10/16/20.     MRI of left shoulder on 3/2/21 showed suspected partial rotator cuff tear with AC joint arthritis. He recommended PT for her shoulders and she was lost to follow up.     Also with history of left knee ACL reconstruction by Dr. Antonio on 9/6/19.     She is here for follow up.     She has intermittent pain in the left shoulder that is more often than not. She is right hand dominant. Cannot sleep at night due to shoulder pain, has to sleep in reclined position. Has numbness and tingling in both arms at night. She has soreness/heaviness in both arms. Pain in left shoulder is burning and is deep. Right shoulder pain is intermittent and tolerable.     No recent PT for her shoulder- she does do dry needling prn with improvement. She has been doing HEP on her own. No surgery on left shoulder.    Also with intermittent left knee pain and swelling. Pain generally worse in afternoon after being on her feet all day. She also has more constant posterior right leg pain x 1-2 weeks. Feels like she "pulled something." She was mopping a few weeks ago an slipped- had pain for a few days.     No relief with motrin. No knee injections. No surgery on right knee.       Past Medical History:   Diagnosis Date    Allergy     Anxiety     BMI 50.0-59.9, adult     Female infertility 2/16/2017 9:50:56 AM    [facility] [employer] Historical - [facility]: Infertility, Unspecified-No Additional Notes    GERD (gastroesophageal reflux disease)     Hypoglycemic disorder     Obesity     Pure hypercholesterolemia 2/16/2017 9:50:50 AM    [facility] [employer] Historical - Unknown: High " cholesterol-No Additional Notes    Thyroid disease     Vertigo          Current Outpatient Medications:     albuterol (PROVENTIL/VENTOLIN HFA) 90 mcg/actuation inhaler, Inhale 1-2 puffs into the lungs every 6 (six) hours as needed for Wheezing or Shortness of Breath. Rescue, Disp: 8 g, Rfl: 0    amitriptyline (ELAVIL) 50 MG tablet, Take 50 mg by mouth every evening., Disp: , Rfl:     butalbital-acetaminophen-caffeine -40 mg (FIORICET, ESGIC) -40 mg per tablet, , Disp: , Rfl:     cetirizine (ZYRTEC) 10 MG tablet, , Disp: , Rfl:     dicyclomine (BENTYL) 20 mg tablet, Take 1 tablet (20 mg total) by mouth 3 (three) times daily as needed (abdominal pain)., Disp: 60 tablet, Rfl: 2    dulaglutide (TRULICITY) 0.75 mg/0.5 mL pen injector, Inject into the skin every 7 days., Disp: , Rfl:     EPINEPHrine (EPIPEN) 0.3 mg/0.3 mL AtIn, Inject 0.3 mLs (0.3 mg total) into the muscle as needed., Disp: 2 each, Rfl: 3    hydrOXYzine HCL (ATARAX) 25 MG tablet, 1 to 8 tablets at bedtime.  Start with 3 tablets.  Increase or decrease as needed until itching is controlled or a maximum of 8 tablets., Disp: 240 tablet, Rfl: 3    Lactobacillus rhamnosus GG (CULTURELLE) 10 billion cell capsule, Take 1 capsule by mouth daily as needed (stomach upset)., Disp: , Rfl:     levocetirizine (XYZAL) 5 MG tablet, TAKE 2 TABLETS BY MOUTH EVERY DAY, Disp: 180 tablet, Rfl: 0    linaCLOtide (LINZESS) 72 mcg Cap capsule, Take 1 capsule (72 mcg total) by mouth once daily., Disp: 30 capsule, Rfl: 2    multivitamin with minerals tablet, Take 1 tablet by mouth once daily., Disp: , Rfl:     ondansetron (ZOFRAN-ODT) 8 MG TbDL, Take 8 mg by mouth every 6 (six) hours as needed (nausea)., Disp: , Rfl:     spironolactone (ALDACTONE) 50 MG tablet, , Disp: , Rfl:     sumatriptan (IMITREX) 100 MG tablet, Take 100 mg by mouth daily as needed., Disp: , Rfl:     ibuprofen (ADVIL,MOTRIN) 800 MG tablet, Take 1 tablet (800 mg total) by mouth 3 (three) times daily  after meals., Disp: 90 tablet, Rfl: 2  No current facility-administered medications for this visit.    Facility-Administered Medications Ordered in Other Visits:     0.9%  NaCl infusion, , Intravenous, Continuous, Erinn Brenner MD, Stopped at 10/16/20 0920    sodium chloride 0.9% flush 10 mL, 10 mL, Intravenous, PRN, Erinn Brenner MD    Review of patient's allergies indicates:   Allergen Reactions    Nuts [tree nut] Anaphylaxis     [place] allergy, Peanut allergy    Sesame seed Anaphylaxis    Cat dander     Dog dander        Review of Systems   Constitutional: Negative for chills, fever, night sweats and weight gain.   Gastrointestinal:  Negative for bowel incontinence, nausea and vomiting.   Genitourinary:  Negative for bladder incontinence.   Neurological:  Negative for disturbances in coordination and loss of balance.         Objective:        Wt 133.8 kg (295 lb)   BMI 53.96 kg/m²     Ortho/SPM Exam    ROM OF BOTH SHOULDERS:  Active flexion to 1860° on left and 180° on right.   Active abduction to 160° on left and 180° on right.      Strength Testing of both UEs:  Deltoid - 5/5 on left and 5/5 on right  Biceps - 5/5 on left and 5/5 on right  Triceps - 5/5 on left and 5/5 on right  Wrist extension - 5/5 on left and 5/5 on right  Wrist flexion - 5/5 on left and 5/5 on right   - 5/5 on left and 5/5 on right    RIGHT SHOULDER EXAM:  Tenderness:  No tenderness at the SC or AC joint  No tenderness over the clavicle   No tenderness over biceps tendon or bicipital groove  No tenderness over subacromial space    Special Tests:  Empty can test - negative  Full can test - negative  Resisted internal rotation - negative  Resisted external rotation - negative    Neer's test - negative  Hawkin's-Dillon test - negative    Speed's test - negative  Yergason's test - negative    Sulcus sign - none  AP load and shift laxity - none    LEFT SHOULDER EXAM:  Tenderness:  Mild tenderness at the AC joint  No tenderness  over the clavicle   No tenderness over biceps tendon or bicipital groove  No tenderness over subacromial space    Special Tests:  Empty can test - positive  Full can test - positive  Resisted internal rotation - negative  Resisted external rotation - positive    Neer's test - negative  Hawkin's-Dillon test - negative    Speed's test - negative  Yergason's test - negative    Sulcus sign - none  AP load and shift laxity - none    Neurovascular Exam Bilateral UEs:  Sensation intact to light touch in the distal median, radial, and ulnar nerve distributions bilaterally.  Capillary refill intact <2 seconds in all digits bilaterally      Body habitus is obese.   The patient walks without a limp.      RIGHT KNEE EXAM:  Resisted SLR negative.   The skin over the knee is intact.  Knee effusion none   Tendernes is located posterior  Range of motion- Flexion full, Extension full,     Ligament exam:   MCL intact   Lachman intact              Post sag intact    LCL intact    Patellar apprehension negative.  Popliteal cyst not palpable but she has tenderness  Patellar crepitation absent.  Flexion/pinch negative.    Motor normal 5/5 strength in all tested muscle groups.   Sensory normal.      LEFT KNEE EXAM:  Resisted SLR negative.   The skin over the knee is has a healed scar.  Knee effusion none   Tendernes is located  n/a  Range of motion- Flexion 120, Extension 0 deg,     Ligament exam:   MCL intact   Lachman intact              Post sag intact    LCL intact    Patellar apprehension negative.  Popliteal cyst negative, but has tenderness  Patellar crepitation absent.  Flexion/pinch negative.    Motor normal 5/5 strength in all tested muscle groups.   Sensory normal.      XRAY INTERPRETATION:  X-rays of both shoulders dated 9/16/22 are personally reviewed and show no fracture or dislocation. Mild AC joint degeneration.     X-rays of both knees dated 9/16/22 are personally reviewed and show postop changed left knee with  reasonably maintained joint space in both knees.         Assessment:       Encounter Diagnoses   Name Primary?    Chronic left shoulder pain Yes    Chronic right shoulder pain     S/P right rotator cuff repair     Acute pain of right knee     Chronic pain of left knee     History of repair of ACL           Plan:       June was seen today for pain, pain, pain and pain.    Diagnoses and all orders for this visit:    Chronic left shoulder pain  -     MRI Shoulder Without Contrast Left; Future  -     ibuprofen (ADVIL,MOTRIN) 800 MG tablet; Take 1 tablet (800 mg total) by mouth 3 (three) times daily after meals.    Chronic right shoulder pain  -     ibuprofen (ADVIL,MOTRIN) 800 MG tablet; Take 1 tablet (800 mg total) by mouth 3 (three) times daily after meals.    S/P right rotator cuff repair  -     ibuprofen (ADVIL,MOTRIN) 800 MG tablet; Take 1 tablet (800 mg total) by mouth 3 (three) times daily after meals.    Acute pain of right knee  -     X-ray Knee Ortho Bilateral with Flexion; Future  -     ibuprofen (ADVIL,MOTRIN) 800 MG tablet; Take 1 tablet (800 mg total) by mouth 3 (three) times daily after meals.    Chronic pain of left knee  -     ibuprofen (ADVIL,MOTRIN) 800 MG tablet; Take 1 tablet (800 mg total) by mouth 3 (three) times daily after meals.    History of repair of ACL  -     ibuprofen (ADVIL,MOTRIN) 800 MG tablet; Take 1 tablet (800 mg total) by mouth 3 (three) times daily after meals.    History of right shoulder rotator cuff repair by him on 3/1/19 and repeat right shoulder scope by him on 10/16/20.     Also with history of left knee ACL reconstruction by Dr. Antonio on 9/6/19.     Primary complaint is intermittent pain in the left shoulder that is more often than not. She is right hand dominant. Right shoulder pain is intermittent and tolerable.     XRs of left shoulder show some AC joint degeneration. Exam suspicious for rotator cuff dysfunction.     Also with 1-2 weeks of more constant posterior  pain in right knee. She has  intermittent left knee pain and swelling.     XRs of both knees show postop changed left knee with reasonably maintained joint space in both knees.     Treatment options reviewed with patient along with above bilateral shoulder/knee xrays. Following plan made:     - MRI of left shoulder to evaluate for rotator cuff dysfunction. No improvement with conservative treatment including PT (does HEP from previous PT and goes for intermittent dry needling), time, or medications.   - New prescription for motrin. Reviewed dosing and side effects. Take with food.   - Discussed PT for right knee. She wants to hold off for now.   - Will have Dr. Antonio review her MRI and email her with his recommendations.     Follow up if symptoms worsen or fail to improve.         ADDENDUM 10/4/22:   MRI of left shoulder dated 9/29/22 is personally reviewed and shows:   1. Supraspinatus tendinopathy.  No distinct rotator cuff tear identified.  2. Mild arthrosis of the acromioclavicular joint.    MRI reviewed with Dr. Antonio. He recommends physical therapy and an injection for her left shoulder.     Patient advised.

## 2022-09-16 ENCOUNTER — OFFICE VISIT (OUTPATIENT)
Dept: ORTHOPEDICS | Facility: CLINIC | Age: 37
End: 2022-09-16
Payer: MEDICAID

## 2022-09-16 VITALS — BODY MASS INDEX: 53.96 KG/M2 | WEIGHT: 293 LBS

## 2022-09-16 DIAGNOSIS — M25.562 CHRONIC PAIN OF LEFT KNEE: ICD-10-CM

## 2022-09-16 DIAGNOSIS — G89.29 CHRONIC PAIN OF LEFT KNEE: ICD-10-CM

## 2022-09-16 DIAGNOSIS — M25.512 CHRONIC LEFT SHOULDER PAIN: Primary | ICD-10-CM

## 2022-09-16 DIAGNOSIS — Z98.890 HISTORY OF REPAIR OF ACL: ICD-10-CM

## 2022-09-16 DIAGNOSIS — G89.29 CHRONIC RIGHT SHOULDER PAIN: ICD-10-CM

## 2022-09-16 DIAGNOSIS — M25.511 CHRONIC RIGHT SHOULDER PAIN: ICD-10-CM

## 2022-09-16 DIAGNOSIS — M25.561 ACUTE PAIN OF RIGHT KNEE: ICD-10-CM

## 2022-09-16 DIAGNOSIS — G89.29 CHRONIC LEFT SHOULDER PAIN: Primary | ICD-10-CM

## 2022-09-16 DIAGNOSIS — Z98.890 S/P RIGHT ROTATOR CUFF REPAIR: ICD-10-CM

## 2022-09-16 PROCEDURE — 3008F PR BODY MASS INDEX (BMI) DOCUMENTED: ICD-10-PCS | Mod: CPTII,,, | Performed by: PHYSICIAN ASSISTANT

## 2022-09-16 PROCEDURE — 3008F BODY MASS INDEX DOCD: CPT | Mod: CPTII,,, | Performed by: PHYSICIAN ASSISTANT

## 2022-09-16 PROCEDURE — 99214 OFFICE O/P EST MOD 30 MIN: CPT | Mod: PBBFAC,PN | Performed by: PHYSICIAN ASSISTANT

## 2022-09-16 PROCEDURE — 99214 OFFICE O/P EST MOD 30 MIN: CPT | Mod: S$PBB,,, | Performed by: PHYSICIAN ASSISTANT

## 2022-09-16 PROCEDURE — 1159F MED LIST DOCD IN RCRD: CPT | Mod: CPTII,,, | Performed by: PHYSICIAN ASSISTANT

## 2022-09-16 PROCEDURE — 99999 PR PBB SHADOW E&M-EST. PATIENT-LVL IV: ICD-10-PCS | Mod: PBBFAC,,, | Performed by: PHYSICIAN ASSISTANT

## 2022-09-16 PROCEDURE — 99214 PR OFFICE/OUTPT VISIT, EST, LEVL IV, 30-39 MIN: ICD-10-PCS | Mod: S$PBB,,, | Performed by: PHYSICIAN ASSISTANT

## 2022-09-16 PROCEDURE — 1159F PR MEDICATION LIST DOCUMENTED IN MEDICAL RECORD: ICD-10-PCS | Mod: CPTII,,, | Performed by: PHYSICIAN ASSISTANT

## 2022-09-16 PROCEDURE — 1160F RVW MEDS BY RX/DR IN RCRD: CPT | Mod: CPTII,,, | Performed by: PHYSICIAN ASSISTANT

## 2022-09-16 PROCEDURE — 1160F PR REVIEW ALL MEDS BY PRESCRIBER/CLIN PHARMACIST DOCUMENTED: ICD-10-PCS | Mod: CPTII,,, | Performed by: PHYSICIAN ASSISTANT

## 2022-09-16 PROCEDURE — 99999 PR PBB SHADOW E&M-EST. PATIENT-LVL IV: CPT | Mod: PBBFAC,,, | Performed by: PHYSICIAN ASSISTANT

## 2022-09-16 RX ORDER — IBUPROFEN 800 MG/1
800 TABLET ORAL
Qty: 90 TABLET | Refills: 2 | Status: SHIPPED | OUTPATIENT
Start: 2022-09-16 | End: 2022-09-26

## 2022-09-16 NOTE — PATIENT INSTRUCTIONS
It was nice to see you again today! I am sorry that you are hurting so much.     Overall, your knee and shoulder xrays look okay.     I want to get an MRI of your left shoulder to look into things further.     I sent motrin 800mg  to your pharmacy to help with pain/inflammation. Take as directed with food.     Continue with PT as needed for shoulder. Can consider PT for right knee if no improvement with motrin.     I will put MRI results and my recommendations on [facility]. Please stay in touch and call me if you need anything. You can also send me a message in [facility].     Bobbi   [phone]

## 2022-09-21 ENCOUNTER — PATIENT MESSAGE (OUTPATIENT)
Dept: ORTHOPEDICS | Facility: CLINIC | Age: 37
End: 2022-09-21
Payer: MEDICAID

## 2022-09-26 ENCOUNTER — PATIENT MESSAGE (OUTPATIENT)
Dept: ORTHOPEDICS | Facility: CLINIC | Age: 37
End: 2022-09-26
Payer: MEDICAID

## 2022-09-26 DIAGNOSIS — G89.29 CHRONIC LEFT SHOULDER PAIN: Primary | ICD-10-CM

## 2022-09-26 DIAGNOSIS — M25.512 CHRONIC LEFT SHOULDER PAIN: Primary | ICD-10-CM

## 2022-09-26 RX ORDER — MELOXICAM 15 MG/1
15 TABLET ORAL DAILY
Qty: 30 TABLET | Refills: 2 | Status: SHIPPED | OUTPATIENT
Start: 2022-09-26 | End: 2023-02-03

## 2022-09-27 ENCOUNTER — PATIENT MESSAGE (OUTPATIENT)
Dept: ORTHOPEDICS | Facility: CLINIC | Age: 37
End: 2022-09-27
Payer: MEDICAID

## 2022-09-29 ENCOUNTER — HOSPITAL ENCOUNTER (OUTPATIENT)
Dept: RADIOLOGY | Facility: HOSPITAL | Age: 37
Discharge: HOME | End: 2022-09-29
Attending: PHYSICIAN ASSISTANT
Payer: MEDICAID

## 2022-09-29 DIAGNOSIS — M25.512 CHRONIC LEFT SHOULDER PAIN: ICD-10-CM

## 2022-09-29 DIAGNOSIS — G89.29 CHRONIC LEFT SHOULDER PAIN: ICD-10-CM

## 2022-09-29 PROCEDURE — 73221 MRI JOINT UPR EXTREM W/O DYE: CPT | Mod: TC,PO,LT

## 2022-09-30 ENCOUNTER — PATIENT MESSAGE (OUTPATIENT)
Dept: ORTHOPEDICS | Facility: CLINIC | Age: 37
End: 2022-09-30
Payer: MEDICAID

## 2022-10-03 ENCOUNTER — PATIENT MESSAGE (OUTPATIENT)
Dept: SPORTS MEDICINE | Facility: CLINIC | Age: 37
End: 2022-10-03
Payer: MEDICAID

## 2022-10-03 ENCOUNTER — PATIENT MESSAGE (OUTPATIENT)
Dept: ORTHOPEDICS | Facility: CLINIC | Age: 37
End: 2022-10-03
Payer: MEDICAID

## 2022-10-04 ENCOUNTER — PATIENT MESSAGE (OUTPATIENT)
Dept: ORTHOPEDICS | Facility: CLINIC | Age: 37
End: 2022-10-04
Payer: MEDICAID

## 2022-10-04 ENCOUNTER — TELEPHONE (OUTPATIENT)
Dept: SPORTS MEDICINE | Facility: CLINIC | Age: 37
End: 2022-10-04
Payer: MEDICAID

## 2022-10-04 NOTE — TELEPHONE ENCOUNTER
Spoke to patient about Dr. Antonio review of MRI and his recommendation of PT and a CSI.     She stated these do not work for her and she has had pain for a year. She also complained of radiating pain and burning into forearm and elbow.     Discussed with patient this pay be coming from her cervical spine and she may need a xray and MRI of this to follow up with a back and spine specialist.     Patient will reach out to her PCP about this.       ----- Message from Shania Carney sent at 10/4/2022  1:44 PM CDT -----  Regarding: PT'S RETURNING A CALL BACK REGARDING SCHEDULING FOR RIGHT SHOULDER PAIN  Contact: PT      Confirmed contact info below:  Contact Name: June Reyes  Phone Number: [phone]

## 2022-10-05 ENCOUNTER — PATIENT MESSAGE (OUTPATIENT)
Dept: ORTHOPEDICS | Facility: CLINIC | Age: 37
End: 2022-10-05
Payer: MEDICAID

## 2022-10-06 ENCOUNTER — PATIENT MESSAGE (OUTPATIENT)
Dept: BARIATRICS | Facility: CLINIC | Age: 37
End: 2022-10-06
Payer: MEDICAID

## 2023-01-30 ENCOUNTER — PATIENT MESSAGE (OUTPATIENT)
Dept: ALLERGY | Facility: CLINIC | Age: 38
End: 2023-01-30
Payer: MEDICAID

## 2023-01-30 NOTE — TELEPHONE ENCOUNTER
Returned patient's call to schedule appointment, no answer. Left message to call back. Refill request sent to .

## 2023-01-30 NOTE — TELEPHONE ENCOUNTER
----- Message from Olya Sheth sent at 1/30/2023  8:10 AM CST -----  Regarding: appt access  Contact: [phone]  Pt is calling to schedule an appt for allergy issues and refill of EPINEPHrine (EPIPEN) 0.3 mg/0.3 mL AtIn and  albuterol (PROVENTIL/VENTOLIN HFA) 90 mcg/actuation inhaler. Please call [phone]      [facility] - [place], [place] - [address]  [address] [address]  Phone: [phone] Fax: [phone]

## 2023-01-31 RX ORDER — ALBUTEROL SULFATE 90 UG/1
1-2 AEROSOL, METERED RESPIRATORY (INHALATION) EVERY 6 HOURS PRN
Qty: 8 G | Refills: 0 | OUTPATIENT
Start: 2023-01-31 | End: 2024-01-31

## 2023-01-31 RX ORDER — EPINEPHRINE 0.3 MG/.3ML
1 INJECTION SUBCUTANEOUS
Qty: 2 EACH | Refills: 3 | OUTPATIENT
Start: 2023-01-31 | End: 2024-01-31

## 2023-02-03 ENCOUNTER — PATIENT MESSAGE (OUTPATIENT)
Dept: ALLERGY | Facility: CLINIC | Age: 38
End: 2023-02-03

## 2023-02-03 ENCOUNTER — OFFICE VISIT (OUTPATIENT)
Dept: ALLERGY | Facility: CLINIC | Age: 38
End: 2023-02-03
Payer: MEDICAID

## 2023-02-03 DIAGNOSIS — J30.9 CHRONIC ALLERGIC RHINITIS: Primary | ICD-10-CM

## 2023-02-03 DIAGNOSIS — J30.1 NON-SEASONAL ALLERGIC RHINITIS DUE TO POLLEN: ICD-10-CM

## 2023-02-03 DIAGNOSIS — L29.9 ITCHING: ICD-10-CM

## 2023-02-03 DIAGNOSIS — J30.89 ALLERGIC RHINITIS DUE TO HOUSE DUST MITE: ICD-10-CM

## 2023-02-03 DIAGNOSIS — Z87.892 HX OF ANAPHYLAXIS: ICD-10-CM

## 2023-02-03 DIAGNOSIS — K21.9 GASTROESOPHAGEAL REFLUX DISEASE, UNSPECIFIED WHETHER ESOPHAGITIS PRESENT: ICD-10-CM

## 2023-02-03 DIAGNOSIS — J45.20 MILD INTERMITTENT ASTHMA, UNSPECIFIED WHETHER COMPLICATED: ICD-10-CM

## 2023-02-03 DIAGNOSIS — J30.1 SEASONAL ALLERGIC RHINITIS DUE TO POLLEN: ICD-10-CM

## 2023-02-03 PROCEDURE — 1160F PR REVIEW ALL MEDS BY PRESCRIBER/CLIN PHARMACIST DOCUMENTED: ICD-10-PCS | Mod: CPTII,95,, | Performed by: STUDENT IN AN ORGANIZED HEALTH CARE EDUCATION/TRAINING PROGRAM

## 2023-02-03 PROCEDURE — 99215 OFFICE O/P EST HI 40 MIN: CPT | Mod: 95,,, | Performed by: STUDENT IN AN ORGANIZED HEALTH CARE EDUCATION/TRAINING PROGRAM

## 2023-02-03 PROCEDURE — 1159F PR MEDICATION LIST DOCUMENTED IN MEDICAL RECORD: ICD-10-PCS | Mod: CPTII,95,, | Performed by: STUDENT IN AN ORGANIZED HEALTH CARE EDUCATION/TRAINING PROGRAM

## 2023-02-03 PROCEDURE — 1160F RVW MEDS BY RX/DR IN RCRD: CPT | Mod: CPTII,95,, | Performed by: STUDENT IN AN ORGANIZED HEALTH CARE EDUCATION/TRAINING PROGRAM

## 2023-02-03 PROCEDURE — 99215 PR OFFICE/OUTPT VISIT, EST, LEVL V, 40-54 MIN: ICD-10-PCS | Mod: 95,,, | Performed by: STUDENT IN AN ORGANIZED HEALTH CARE EDUCATION/TRAINING PROGRAM

## 2023-02-03 PROCEDURE — 1159F MED LIST DOCD IN RCRD: CPT | Mod: CPTII,95,, | Performed by: STUDENT IN AN ORGANIZED HEALTH CARE EDUCATION/TRAINING PROGRAM

## 2023-02-03 RX ORDER — CETIRIZINE HYDROCHLORIDE 10 MG/1
10 TABLET ORAL DAILY
Refills: 0 | COMMUNITY
Start: 2023-02-03 | End: 2024-02-03

## 2023-02-03 RX ORDER — FLUTICASONE PROPIONATE 50 MCG
2 SPRAY, SUSPENSION (ML) NASAL 2 TIMES DAILY
Qty: 31.6 ML | Refills: 5 | Status: SHIPPED | OUTPATIENT
Start: 2023-02-03

## 2023-02-03 RX ORDER — TIZANIDINE 4 MG/1
4 TABLET ORAL EVERY 8 HOURS PRN
COMMUNITY
Start: 2022-10-17 | End: 2023-03-07

## 2023-02-03 RX ORDER — HYDROXYZINE HYDROCHLORIDE 25 MG/1
TABLET, FILM COATED ORAL
Qty: 240 TABLET | Refills: 3 | Status: SHIPPED | OUTPATIENT
Start: 2023-02-03

## 2023-02-03 RX ORDER — VALACYCLOVIR HYDROCHLORIDE 1 G/1
TABLET, FILM COATED ORAL
COMMUNITY
Start: 2022-10-18

## 2023-02-03 RX ORDER — ESCITALOPRAM OXALATE 10 MG/1
10 TABLET ORAL
COMMUNITY
Start: 2022-11-07 | End: 2023-03-07

## 2023-02-03 RX ORDER — AMITRIPTYLINE HYDROCHLORIDE 25 MG/1
1 TABLET, FILM COATED ORAL NIGHTLY
COMMUNITY
Start: 2022-11-09 | End: 2023-03-07

## 2023-02-03 RX ORDER — ALBUTEROL SULFATE 90 UG/1
1-2 AEROSOL, METERED RESPIRATORY (INHALATION) EVERY 6 HOURS PRN
Qty: 8 G | Refills: 0 | Status: SHIPPED | OUTPATIENT
Start: 2023-02-03 | End: 2024-02-03

## 2023-02-03 RX ORDER — BUDESONIDE AND FORMOTEROL FUMARATE DIHYDRATE 160; 4.5 UG/1; UG/1
1 AEROSOL RESPIRATORY (INHALATION) EVERY MORNING
Qty: 10.2 G | Refills: 3 | Status: SHIPPED | OUTPATIENT
Start: 2023-02-03

## 2023-02-03 RX ORDER — FAMOTIDINE 20 MG/1
20 TABLET, FILM COATED ORAL 2 TIMES DAILY
COMMUNITY
Start: 2022-11-07 | End: 2023-03-07

## 2023-02-03 RX ORDER — EPINEPHRINE 0.3 MG/.3ML
1 INJECTION SUBCUTANEOUS ONCE
Qty: 1 EACH | Refills: 11 | Status: SHIPPED | OUTPATIENT
Start: 2023-02-03 | End: 2023-02-03

## 2023-02-03 NOTE — PROGRESS NOTES
"Allergy tele health Note  [facility]    The patient location is:  [place]  The chief complaint leading to consultation is:  Worsening rhinitis and    Visit type: audiovisual    Face to Face time with patient:  21 minutes  62 minutes of total time spent on the encounter, which includes face to face time and non-face to face time preparing to see the patient (eg, review of tests), Obtaining and/or reviewing separately obtained history, Documenting clinical information in the electronic or other health record, Independently interpreting results (not separately reported) and communicating results to the patient/family/caregiver, or Care coordination (not separately reported).     Each patient to whom he or she provides medical services by telemedicine is:  (1) informed of the relationship between the physician and patient and the respective role of any other health care provider with respect to management of the patient; and (2) notified that he or she may decline to receive medical services by telemedicine and may withdraw from such care at any time.    This note was created by combination of typed  and M-Modal dictation. Transcription errors may be present.  If there are any questions, please contact me.    Subjective:      Patient ID: June Reyes is a 37 y.o. female.    Chief Complaint: No chief complaint on file.      Allergy problem list:   Two episodes of sensation of throat closing, "Attacks"        With cough, SOB, throat itching, ST, and runny nose         ADM X1         Normal ENT endoscopy during hoarseness and throat constriction feeling  Dust allergy  Pollen allergy:  Grass > tree    History of Present Illness:  34-year-old female with springtime allergic rhinitis [facility]  She is seen today via telehealth for worsening cough, rhino conjunctivitis, and skin symptoms for the last 6 months, especially bad for the last 3 weeks.    Related " medications and other interventions  EpiPen ()  Zyrtec 1 tablet daily    (Elavil)    2/3/23:  Ms Reyes reports chronic cough and also severe episodes of coughing spells with shortness of breath.  The latter is helped by albuterol.  She is currently using albuterol about 4 puffs daily.  She is no longer taking Symbicort.  She also complains of mouth burning and sudden-onset rhino conjunctivitis symptoms.  These are often precipitated by exposure to another dog or scented products.  She is also typically worse in the spring.  She also reports an episode of mouth itching which she attributed to eating tomatoes.  She also reports return of diffuse itching for the last 6 months and intermittent hives.   She has had no attacks since last visit.  She does report an episode of severe bronchospasm with exposure to another dog.  She denies problems with her own dog who is sometimes in the bed.    2021:  Client stated that itching was well controlled on a combination of high-dose H1 and H2 antihistamine blockers.  She was able to sleep through the night without awakening or scratching on Atarax 75 mg hs.  No side effects or other problems from her medications.She states adherence with the Symbicort b.i.d.  she has not had any a attacks since last visit.      Previous Hx  client stated her most recent attack of bronchospasm occurred with a combination of dogs and dust.  She said this was 10 times worse than her initial episode.  She experience shortness of breath as her chief complaint.  She used her EpiPen and her albuterol.  In the emergency room she was treated with nebs x5.  No other details are available. She reported sporadic cough which she says is relieved by Ventolin.    She also complained of continued severe itching.  New that week were individual pustules on her chest and back.  She add a photo showing primarily scratch marks and another that she says shows a bump.  It is not clear whether this is  "consistent with hives.  She is concerned about food allergy, especially crab.  She also reports that after her COVID infection her sense of taste did return but that she is now bothered by food textures.  Patient has a history of seasonal rhinitis including grass and tree pollens.  She is worse outdoors. symptoms include severe cough the level of her throat associated with nasal congestion, loss of smell, loss of sense of taste.   She denies any fever, shortness of breath, wheezing, chest pain, or chest symptoms.  Other associated symptoms included some increased ear pressure   Patient also has a history of "episodes but none since last visit.  She reports that she has been avoiding tree nuts and sesame seeds and denies any accidental exposures.  She does have an EpiPen.  Patient was complaining of the sensation of throat tightening and hoarseness.  ENT endoscopy during symptoms failed to show any swelling, narrowing or other abnormality except for enlarged tonsils. A tryptase level done mid hospitalization is same as her baseline.  According to the hospitalist, her symptoms waxed and waned during her admission.  The tryptase level was drawn shortly after the onset of 1 of these periods of increased symptoms.  Following the ENT evaluation, the hospitalist group and I decided to deescalate care.      At her initial visit she reported 2 episodes as follows:  The more severe of the 2 episodes occurred 01/10/2020 with a chronology as follows.  She felt well during the day.  She ate dinner at [employer] at aproximately 6:45 consisting of a hamburger, wheat bread, sweet potato fries.  While at the restaurant she noticed some hand itching.  After leaving the restaurant she went to the [facility] and to [facility].  She had no significant symptoms but in retrospect her  told her she was rubbing her arms.  They next went to a friend's house around 10:40 p.m. where there were 2 dogs.  Within 10 min she " developed sudden throat itching, a deep cough, shortness of breath, a sore throat, and runny nose.   The left the house due to these symptoms.  While driving symptoms worsened and they decided to go to the emergency room.  Upon arrival in the emergency room, she was complaining of cough, shortness of breath, red/watery eyes.  She states she felt in a fog and remembers very few details.  She says she also felt like her neck was big.  She says the nurse said it looked like her throat was closing.  She admits to hoarseness and reports a panicky sensation.  She responded rapidly to treatment in the emergency department and was discharged home.  She had a similar but milder episode 2 days before her initial visit.  This consisted of itchy throat, cough, difficulty taking a deep breath, and questionable sensation of throat closing.  She treated herself with Benadryl and Pepcid prior to presenting to the emergency room.    She denies the possibility of sting with either episode.  She also denies taking aspirin, NSAIDs, or anything other than her usual medicines.  At the time her usual medicines consisted of Lexapro, Prilosec, Zofran.  (note that there are other medications listed on her med list but she says she has not taken them in quite a while.  In particular, she denies taking the Celebrex.)     Other allergic syndromes include:  1.  Cats.  Exposure to cats cause eyelid swelling  2.  Walnuts.  Several years ago contact with walnuts caused a rash and she had a positive skin test.  More recently she had an accidental walnut ingestion and immediately took a Benadryl.  She had no symptoms.  3.  Watermelon and cantaloupe.  Consuming these foods causes an itchy throat.  4.  She reports that she is intolerant of either Zyrtec or Zyrtec D which causes palpitations.  She has subsequently tolerated Zyrtec without difficulty.    Additional History:   Past medical history is significant for hypoglycemia and GERD.  She is status  post tonsillectomy.  Family history is significant for father and brother with rhinitis.  There is no family history of asthma.  Client  reports that she quit smoking about 5 years ago. Her smoking use included cigarettes. She has never used smokeless tobacco.   No exposure to mold.     Patient Active Problem List   Diagnosis    Nontraumatic tear of right rotator cuff    Morbid obesity with BMI of 50.0-59.9, adult    Biceps tendinitis of right upper extremity    Bilateral numbness and tingling of arms and legs    Left anterior cruciate ligament tear    Gastroesophageal reflux disease without esophagitis    Insomnia    Chronic idiopathic constipation    Abdominal pain, generalized    Anaphylactic syndrome    Leukemoid reaction    Dysphonia    Dyspnea    Hyperthyroidism    Rectal bleeding     Current Outpatient Medications on File Prior to Visit   Medication Sig Dispense Refill    amitriptyline (ELAVIL) 25 MG tablet Take 1 tablet by mouth every evening.      tiZANidine (ZANAFLEX) 4 MG tablet Take 4 mg by mouth every 8 (eight) hours as needed.      valACYclovir (VALTREX) 1000 MG tablet       dicyclomine (BENTYL) 20 mg tablet Take 1 tablet (20 mg total) by mouth 3 (three) times daily as needed (abdominal pain). 60 tablet 2    dulaglutide (TRULICITY) 4.5 mg/0.5 mL pen injector Inject 4.5 mg into the skin once (1) a week in the evening for 180 days. 4 pen 2    EScitalopram oxalate (LEXAPRO) 10 MG tablet Take 10 mg by mouth.      famotidine (PEPCID) 20 MG tablet Take 20 mg by mouth 2 (two) times daily.      linaCLOtide (LINZESS) 72 mcg Cap capsule Take 1 capsule (72 mcg total) by mouth once daily. 30 capsule 2    meloxicam (MOBIC) 15 MG tablet Take 1 tablet (15 mg total) by mouth once daily. Take with food. 30 tablet 2    multivitamin with minerals tablet Take 1 tablet by mouth once daily.      ondansetron (ZOFRAN-ODT) 8 MG TbDL Take 8 mg by mouth every 6 (six) hours as needed (nausea).      spironolactone (ALDACTONE) 50  MG tablet       [DISCONTINUED] albuterol (PROVENTIL/VENTOLIN HFA) 90 mcg/actuation inhaler Inhale 1-2 puffs into the lungs every 6 (six) hours as needed for Wheezing or Shortness of Breath. Rescue 8 g 0    [DISCONTINUED] amitriptyline (ELAVIL) 50 MG tablet Take 50 mg by mouth every evening.      [DISCONTINUED] butalbital-acetaminophen-caffeine -40 mg (FIORICET, ESGIC) -40 mg per tablet       [DISCONTINUED] cetirizine (ZYRTEC) 10 MG tablet       [DISCONTINUED] dulaglutide (TRULICITY) 0.75 mg/0.5 mL pen injector Inject into the skin every 7 days.      [DISCONTINUED] EPINEPHrine (EPIPEN) 0.3 mg/0.3 mL AtIn Inject 0.3 mLs (0.3 mg total) into the muscle as needed. 2 each 3    [DISCONTINUED] hydrOXYzine HCL (ATARAX) 25 MG tablet 1 to 8 tablets at bedtime.  Start with 3 tablets.  Increase or decrease as needed until itching is controlled or a maximum of 8 tablets. 240 tablet 3    [DISCONTINUED] Lactobacillus rhamnosus GG (CULTURELLE) 10 billion cell capsule Take 1 capsule by mouth daily as needed (stomach upset).      [DISCONTINUED] levocetirizine (XYZAL) 5 MG tablet TAKE 2 TABLETS BY MOUTH EVERY  tablet 0    [DISCONTINUED] sumatriptan (IMITREX) 100 MG tablet Take 100 mg by mouth daily as needed.       Current Facility-Administered Medications on File Prior to Visit   Medication Dose Route Frequency Provider Last Rate Last Admin    0.9%  NaCl infusion   Intravenous Continuous Erinn Brenner MD   Stopped at 10/16/20 0920    sodium chloride 0.9% flush 10 mL  10 mL Intravenous PRN Erinn Brenner MD             Review of Systems   Constitutional:  Negative for chills and fever.   HENT:  Positive for congestion. Negative for ear discharge and nosebleeds.    Eyes:  Negative for discharge and redness.   Respiratory:  Positive for cough. Negative for hemoptysis, sputum production and stridor.    Cardiovascular:  Negative for palpitations.   Gastrointestinal:  Negative for blood in stool, melena and  vomiting.   Genitourinary:  Negative for flank pain and hematuria.   Skin:  Positive for itching and rash.   Neurological:  Negative for seizures and loss of consciousness.   Endo/Heme/Allergies:  Positive for environmental allergies. Answers for     Objective:   There were no vitals taken for this visit.      Physical Exam  Awake and alert  No respiratory distress  Speech fluent and logical      Data:   Allergy testing by the Immunoc[employer] method (01/16/2020 was most notable for positive reactions to dust mites and grass.  She also had lower level reactions to other aeroallergens and borderline reactions to a number of foods.  Aeroallergens:   Class IV  dust mite (Df), Bermuda grass, Ramiro grass   Class III   dust mite (Dp), ragweed, dog   Class II    English plantain, oak, pecan, marsh elder, Alternaria, cat  Foods:   Class II     sesame seed   Class I      corn, walnut, pistachio, hazelnut, almond,   Latex:   Class II  All other allergens less than 0.35 KU/L  Total serum IgE 264.    Baseline tryptase 4.6 (01/16/2020)  Tryptase mid hospitalization 4.8 (01/28/2020)    Demonstrated excellent use of Epi autoinjector  at initial visit        Assessment:     1. Chronic allergic rhinitis    2. Allergic rhinitis due to house dust mite    3. Seasonal allergic rhinitis due to tree pollen    4. Non-seasonal allergic rhinitis due to grass pollen    5. Hx of anaphylaxis with normal tryptase during Sx    6. Mild intermittent asthma, unspecified whether complicated    7. Gastroesophageal reflux disease, unspecified whether esophagitis present    8. Itching          Plan:   Medical decision making:  Of clients coughing appears to be due to bronchospasm and some to uncontrolled reflux/LPR.  Recommend resuming smart therapy.  Instructed patient to contact me with name of current anti reflux medicine.  Patient is also presenting with uncontrolled rhinitis--a mixture of chronic allergic rhinitis and vasomotor rhinitis.   "Discussed nasal steroid as treatment of choice.  Also plan skin testing,        Chronic allergic rhinitis  due to dust mite, springtime tree pollen, and grass pollen  Vasomotor rhinitis      Zyrtec in AM + prn      Flonase 2 SEN BID      Follow up for skin testing when able to hold AH    Hx of "attacks" c/w anaphylaxis with normal tryptase and endoscopy during Sx       EpiPen    Mild intermittent asthma vs mild persistent asthma        SMART therapy:  Symbicort 1 puff AM + prn    Oral allergy vs LPR m/b mouth burning         Discussed DDx    comorbidity  Gastroesophageal reflux disease - not controlled m/b chronic cough          Need name of current medication.          Patient Instructions   Testing  Allergy skin testing      Staff will call you to schedule      You will need to be off Zyrtec/cetirizine and Atarax/hydroxyzine for 5 days prior    Please send me a portal message with the name and dosage of your current reflux medication.    Treatment    Flonase (= fluticasone) nasal spray:  2 squirts each nostril twice a day   Remember to aim out toward your ear.   Needs to be used regularly 5-14 days for full effect.    Symbicort:  1 puff every morning  Extra albuterol or Symbicort as often as needed    Continue daily Zyrtec with extra as needed    Refilled EpiPen      Follow-up for skin testing        Pennie Philip MD            "

## 2023-02-03 NOTE — PATIENT INSTRUCTIONS
Testing  Allergy skin testing      Staff will call you to schedule      You will need to be off Zyrtec/cetirizine and Atarax/hydroxyzine for 5 days prior    Please send me a portal message with the name and dosage of your current reflux medication.    Treatment    Flonase (= fluticasone) nasal spray:  2 squirts each nostril twice a day   Remember to aim out toward your ear.   Needs to be used regularly 5-14 days for full effect.    Symbicort:  1 puff every morning  Extra albuterol or Symbicort as often as needed    Continue daily Zyrtec with extra as needed    Refilled EpiPen      Follow-up for skin testing

## 2023-06-16 ENCOUNTER — PATIENT MESSAGE (OUTPATIENT)
Dept: PODIATRY | Facility: CLINIC | Age: 38
End: 2023-06-16
Payer: MEDICAID

## 2024-03-12 NOTE — PROVIDER PROGRESS NOTES - EMERGENCY DEPT.
Encounter Date: 10/8/2017    ED Physician Progress Notes        Physician Note:   Patient signed over to me with pending results. Imaging was negative. Patient referred to primary care doctor for further workup        Awake/Alert

## 2024-05-06 ENCOUNTER — HOSPITAL ENCOUNTER (EMERGENCY)
Facility: HOSPITAL | Age: 39
Discharge: HOME | End: 2024-05-06
Attending: EMERGENCY MEDICINE
Payer: MEDICAID

## 2024-05-06 VITALS
RESPIRATION RATE: 18 BRPM | DIASTOLIC BLOOD PRESSURE: 67 MMHG | BODY MASS INDEX: 48.34 KG/M2 | HEIGHT: 62 IN | OXYGEN SATURATION: 99 % | WEIGHT: 262.69 LBS | TEMPERATURE: 98 F | HEART RATE: 99 BPM | SYSTOLIC BLOOD PRESSURE: 128 MMHG

## 2024-05-06 DIAGNOSIS — R11.2 NAUSEA AND VOMITING, UNSPECIFIED VOMITING TYPE: Primary | ICD-10-CM

## 2024-05-06 DIAGNOSIS — R19.7 DIARRHEA, UNSPECIFIED TYPE: ICD-10-CM

## 2024-05-06 LAB
ALBUMIN SERPL BCP-MCNC: 4.1 G/DL (ref 3.5–5.2)
ALP SERPL-CCNC: 78 U/L (ref 38–126)
ALT SERPL W/O P-5'-P-CCNC: 32 U/L (ref 10–44)
ANION GAP SERPL CALC-SCNC: 11 MMOL/L (ref 8–16)
AST SERPL-CCNC: 21 U/L (ref 15–46)
B-HCG UR QL: NEGATIVE
BASOPHILS # BLD AUTO: 0.03 K/UL (ref 0–0.2)
BASOPHILS NFR BLD: 0.3 % (ref 0–1.9)
BILIRUB SERPL-MCNC: 1.1 MG/DL (ref 0.1–1)
BILIRUB UR QL STRIP: ABNORMAL
CALCIUM SERPL-MCNC: 9.3 MG/DL (ref 8.7–10.5)
CHLORIDE SERPL-SCNC: 111 MMOL/L (ref 95–110)
CLARITY UR REFRACT.AUTO: CLEAR
CO2 SERPL-SCNC: 20 MMOL/L (ref 23–29)
COLOR UR AUTO: YELLOW
CREAT SERPL-MCNC: 0.47 MG/DL (ref 0.5–1.4)
CTP QC/QA: YES
DIFFERENTIAL METHOD BLD: ABNORMAL
EOSINOPHIL # BLD AUTO: 0.1 K/UL (ref 0–0.5)
EOSINOPHIL NFR BLD: 1.1 % (ref 0–8)
ERYTHROCYTE [DISTWIDTH] IN BLOOD BY AUTOMATED COUNT: 13.9 % (ref 11.5–14.5)
EST. GFR  (NO RACE VARIABLE): >60 ML/MIN/1.73 M^2
GLUCOSE SERPL-MCNC: 127 MG/DL (ref 70–110)
GLUCOSE UR QL STRIP: NEGATIVE
HCT VFR BLD AUTO: 43.7 % (ref 37–48.5)
HGB BLD-MCNC: 15 G/DL (ref 12–16)
HGB UR QL STRIP: NEGATIVE
IMM GRANULOCYTES # BLD AUTO: 0.03 K/UL (ref 0–0.04)
IMM GRANULOCYTES NFR BLD AUTO: 0.3 % (ref 0–0.5)
KETONES UR QL STRIP: ABNORMAL
LEUKOCYTE ESTERASE UR QL STRIP: NEGATIVE
LIPASE SERPL-CCNC: 58 U/L (ref 23–300)
LYMPHOCYTES # BLD AUTO: 0.3 K/UL (ref 1–4.8)
LYMPHOCYTES NFR BLD: 3.2 % (ref 18–48)
MCH RBC QN AUTO: 30.5 PG (ref 27–31)
MCHC RBC AUTO-ENTMCNC: 34.3 G/DL (ref 32–36)
MCV RBC AUTO: 89 FL (ref 82–98)
MONOCYTES # BLD AUTO: 0.3 K/UL (ref 0.3–1)
MONOCYTES NFR BLD: 3.7 % (ref 4–15)
NEUTROPHILS # BLD AUTO: 8.3 K/UL (ref 1.8–7.7)
NEUTROPHILS NFR BLD: 91.4 % (ref 38–73)
NITRITE UR QL STRIP: NEGATIVE
NRBC BLD-RTO: 0 /100 WBC
PH UR STRIP: 6 [PH] (ref 5–8)
PLATELET # BLD AUTO: 214 K/UL (ref 150–450)
PMV BLD AUTO: 12.8 FL (ref 9.2–12.9)
POTASSIUM SERPL-SCNC: 3.1 MMOL/L (ref 3.5–5.1)
PROT SERPL-MCNC: 7 G/DL (ref 6–8.4)
PROT UR QL STRIP: NEGATIVE
RBC # BLD AUTO: 4.92 M/UL (ref 4–5.4)
SODIUM SERPL-SCNC: 142 MMOL/L (ref 136–145)
SP GR UR STRIP: 1.02 (ref 1–1.03)
URN SPEC COLLECT METH UR: ABNORMAL
UROBILINOGEN UR STRIP-ACNC: NEGATIVE EU/DL
UUN UR-MCNC: 12 MG/DL (ref 7–17)
WBC # BLD AUTO: 9.09 K/UL (ref 3.9–12.7)

## 2024-05-06 PROCEDURE — 81003 URINALYSIS AUTO W/O SCOPE: CPT | Mod: ER | Performed by: EMERGENCY MEDICINE

## 2024-05-06 PROCEDURE — 63600175 PHARM REV CODE 636 W HCPCS: Mod: ER | Performed by: EMERGENCY MEDICINE

## 2024-05-06 PROCEDURE — 83690 ASSAY OF LIPASE: CPT | Mod: ER | Performed by: EMERGENCY MEDICINE

## 2024-05-06 PROCEDURE — 96375 TX/PRO/DX INJ NEW DRUG ADDON: CPT | Mod: ER

## 2024-05-06 PROCEDURE — 80053 COMPREHEN METABOLIC PANEL: CPT | Mod: ER | Performed by: EMERGENCY MEDICINE

## 2024-05-06 PROCEDURE — 96361 HYDRATE IV INFUSION ADD-ON: CPT | Mod: ER

## 2024-05-06 PROCEDURE — 99284 EMERGENCY DEPT VISIT MOD MDM: CPT | Mod: 25,ER

## 2024-05-06 PROCEDURE — 85025 COMPLETE CBC W/AUTO DIFF WBC: CPT | Mod: ER | Performed by: EMERGENCY MEDICINE

## 2024-05-06 PROCEDURE — 81025 URINE PREGNANCY TEST: CPT | Mod: ER | Performed by: EMERGENCY MEDICINE

## 2024-05-06 PROCEDURE — 96365 THER/PROPH/DIAG IV INF INIT: CPT | Mod: ER

## 2024-05-06 PROCEDURE — 25000003 PHARM REV CODE 250: Mod: ER | Performed by: EMERGENCY MEDICINE

## 2024-05-06 RX ORDER — POTASSIUM CHLORIDE 20 MEQ/1
20 TABLET, EXTENDED RELEASE ORAL
Status: COMPLETED | OUTPATIENT
Start: 2024-05-06 | End: 2024-05-06

## 2024-05-06 RX ORDER — ONDANSETRON HYDROCHLORIDE 2 MG/ML
4 INJECTION, SOLUTION INTRAVENOUS
Status: COMPLETED | OUTPATIENT
Start: 2024-05-06 | End: 2024-05-06

## 2024-05-06 RX ORDER — PROMETHAZINE HYDROCHLORIDE 25 MG/1
25 TABLET ORAL EVERY 6 HOURS PRN
Qty: 15 TABLET | Refills: 0 | Status: SHIPPED | OUTPATIENT
Start: 2024-05-06

## 2024-05-06 RX ADMIN — SODIUM CHLORIDE, POTASSIUM CHLORIDE, SODIUM LACTATE AND CALCIUM CHLORIDE 1000 ML: 600; 310; 30; 20 INJECTION, SOLUTION INTRAVENOUS at 05:05

## 2024-05-06 RX ADMIN — PROMETHAZINE HYDROCHLORIDE 12.5 MG: 25 INJECTION INTRAMUSCULAR; INTRAVENOUS at 05:05

## 2024-05-06 RX ADMIN — ONDANSETRON 4 MG: 2 INJECTION INTRAMUSCULAR; INTRAVENOUS at 05:05

## 2024-05-06 RX ADMIN — POTASSIUM CHLORIDE 20 MEQ: 1500 TABLET, EXTENDED RELEASE ORAL at 06:05

## 2024-05-06 NOTE — ED PROVIDER NOTES
ED Provider Note - 5/6/2024    History     Chief Complaint   Patient presents with    Abdominal Pain     Pt to the Er with c/o feeling dehydrated and weak. Pt reports diarrhea all day yesterday and started vomiting about 11 p last night. Pt reports Gastric sleeve surgery in March.      Patient currently presents with chief complaint of nausea and vomiting.  Onset noted this PM.  There have been several bouts of emesis and associated diarrhea (the latter of which began yesterday AM).  Patient denies fever.  Patient denies sustained abdominal pain.  Patient denies urinary symptoms.  There is not blood in the stools.  Patient notably s/p gastric sleeve 3/28/24 at [facility] per Dr Murray.      Review of patient's allergies indicates:   Allergen Reactions    Nuts [tree nut] Anaphylaxis     [place] allergy, Peanut allergy    Sesame seed Anaphylaxis    Cat dander     Dog dander      Past Medical History:   Diagnosis Date    Allergy     Anxiety     BMI 50.0-59.9, adult     Female infertility 2/16/2017 9:50:56 AM    [facility] Historical - [facility]: Infertility, Unspecified-No Additional Notes    GERD (gastroesophageal reflux disease)     Hypoglycemic disorder     Obesity     Pure hypercholesterolemia 2/16/2017 9:50:50 AM    [facility] Historical - Unknown: High cholesterol-No Additional Notes    Thyroid disease     Vertigo      Past Surgical History:   Procedure Laterality Date    ADENOIDECTOMY      ARTHROSCOPIC CHONDROPLASTY OF KNEE JOINT Left 09/06/2019    Procedure: ARTHROSCOPY, KNEE, WITH CHONDROPLASTY;  Surgeon: Sravan Antonio MD;  Location: [facility] OR;  Service: Orthopedics;  Laterality: Left;    ARTHROSCOPIC DEBRIDEMENT OF SHOULDER Right 03/01/2019    Procedure: DEBRIDEMENT, SHOULDER, ARTHROSCOPIC;  Surgeon: Sravan Antonio MD;  Location: [facility] OR;  Service: Orthopedics;  Laterality: Right;  labral debridement    ARTHROSCOPIC DEBRIDEMENT OF SHOULDER Right 10/16/2020    Procedure: DEBRIDEMENT, SHOULDER, ARTHROSCOPIC;   Surgeon: Sravan Antonio MD;  Location: [facility] OR;  Service: Orthopedics;  Laterality: Right;    ARTHROSCOPIC REPAIR OF ROTATOR CUFF OF SHOULDER Right 2019    Procedure: REPAIR, ROTATOR CUFF, ARTHROSCOPIC;  Surgeon: Sravan Antonio MD;  Location: [facility] OR;  Service: Orthopedics;  Laterality: Right;  subscapularis  Regeneten implant    ARTHROSCOPY OF KNEE Left 2019    Procedure: ARTHROSCOPY, KNEE;  Surgeon: Sravan Antonio MD;  Location: [facility] OR;  Service: Orthopedics;  Laterality: Left;  regional w/o catheter / adductor     ARTHROSCOPY OF SHOULDER WITH DECOMPRESSION OF SUBACROMIAL SPACE Right 2019    Procedure: ARTHROSCOPY, SHOULDER, WITH SUBACROMIAL SPACE DECOMPRESSION;  Surgeon: Sravan Antonio MD;  Location: [facility] OR;  Service: Orthopedics;  Laterality: Right;    ARTHROSCOPY OF SHOULDER WITH DECOMPRESSION OF SUBACROMIAL SPACE Right 10/16/2020    Procedure: ARTHROSCOPY, SHOULDER, WITH SUBACROMIAL SPACE DECOMPRESSION;  Surgeon: Sravan Antonio MD;  Location: [facility] OR;  Service: Orthopedics;  Laterality: Right;    ARTHROSCOPY OF SHOULDER WITH REMOVAL OF DISTAL CLAVICLE Right 10/16/2020    Procedure: ARTHROSCOPY, SHOULDER, WITH DISTAL CLAVICLE EXCISION;  Surgeon: Sravan Antonio MD;  Location: [facility] OR;  Service: Orthopedics;  Laterality: Right;  regional w/o catheter (interscalene)     SECTION      COLONOSCOPY N/A 2020    Procedure: COLONOSCOPY;  Surgeon: Erinn Brenner MD;  Location: [facility] ENDO;  Service: Endoscopy;  Laterality: N/A;    DILATION AND CURETTAGE OF UTERUS      ESOPHAGOGASTRODUODENOSCOPY N/A 2020    Procedure: EGD (ESOPHAGOGASTRODUODENOSCOPY);  Surgeon: Erinn Brenner MD;  Location: [facility] ENDO;  Service: Endoscopy;  Laterality: N/A;    GANGLION CYST EXCISION      LIPOMA RESECTION Right 2024    Right wait line    RECONSTRUCTION OF LIGAMENT Left 2019    Procedure: RECONSTRUCTION, LIGAMENT;  Surgeon: Sravan Antonio  MD;  Location: [facility] OR;  Service: Orthopedics;  Laterality: Left;  ACL    REPAIR OF MENISCUS OF KNEE Left 2019    Procedure: REPAIR, MENISCUS, KNEE;  Surgeon: Sravan Antonio MD;  Location: [facility] OR;  Service: Orthopedics;  Laterality: Left;  lateral    TONSILLECTOMY       Family History   Problem Relation Name Age of Onset    Diabetes Mother Arianne     Hypertension Mother Arianne     Depression Mother Arianne     Achalasia Mother Arianne     Hypertension Father Paw erika     Heart disease Father Paw erika     Allergies Father Paw erika     Cancer Maternal Aunt Breast     Diabetes Paternal Grandfather Paw erika     Autism Brother      Allergies Son      Allergic rhinitis Neg Hx      Angioedema Neg Hx      Asthma Neg Hx      Atopy Neg Hx      Eczema Neg Hx      Immunodeficiency Neg Hx      Rhinitis Neg Hx      Urticaria Neg Hx       Social History     Tobacco Use    Smoking status: Former     Current packs/day: 0.00     Types: Cigarettes, Vaping with nicotine     Quit date: 2017     Years since quittin.1    Smokeless tobacco: Never   Substance Use Topics    Alcohol use: Not Currently     Comment: 2x a month    Drug use: No     Review of Systems   Constitutional:  Negative for chills and fever.   HENT:  Negative for congestion and rhinorrhea.    Respiratory:  Negative for cough and shortness of breath.    Cardiovascular:  Negative for chest pain and palpitations.   Gastrointestinal:  Positive for diarrhea, nausea and vomiting. Negative for abdominal pain and blood in stool.   Genitourinary:  Negative for difficulty urinating and dysuria.   Skin:  Negative for color change and rash.   Neurological:  Negative for dizziness and light-headedness.   Hematological:  Negative for adenopathy. Does not bruise/bleed easily.     Physical Exam     Initial Vitals [24 0440]   BP Pulse Resp Temp SpO2   135/73 103 18 97.7 °F (36.5 °C) 98 %      MAP       --         Vitals:    24 0440 24 0720  "05/06/24 0726   BP: 135/73 128/67    Pulse: 103  99   Resp: 18     Temp: 97.7 °F (36.5 °C)     TempSrc: Oral     SpO2: 98%  99%   Weight: 119.2 kg (262 lb 10.9 oz)     Height: 5' 2" (1.575 m)       Physical Exam    Nursing note and vitals reviewed.  Constitutional: She appears well-developed and well-nourished. She is not diaphoretic. No distress.   HENT:   Head: Normocephalic and atraumatic.   Nose: Nose normal.   Mouth/Throat: Oropharynx is clear and moist.   Eyes: Conjunctivae are normal. No scleral icterus.   Cardiovascular:  Regular rhythm, normal heart sounds and intact distal pulses.   Tachycardia present.         Pulmonary/Chest: Breath sounds normal. No respiratory distress.   Abdominal: Abdomen is soft. She exhibits no distension. There is no abdominal tenderness.   Musculoskeletal:         General: No edema. Normal range of motion.     Neurological: She is alert and oriented to person, place, and time. She has normal strength.   Skin: Skin is warm and dry.       ED Course   Procedures                   MDM  Differential Diagnoses   Based on available history, the working differential diagnoses considered during this evaluation include but are not limited to viral gastroenteritis, food poisoning, intra-abdominal infection, enteritis/colitis, post surgical complication.      LABS     Labs Reviewed   URINALYSIS, REFLEX TO URINE CULTURE - Abnormal; Notable for the following components:       Result Value    Ketones, UA 2+ (*)     Bilirubin (UA) 1+ (*)     All other components within normal limits    Narrative:     Preferred Collection Type->Urine, Clean Catch  Specimen Source->Urine   CBC W/ AUTO DIFFERENTIAL - Abnormal; Notable for the following components:    Gran # (ANC) 8.3 (*)     Lymph # 0.3 (*)     Gran % 91.4 (*)     Lymph % 3.2 (*)     Mono % 3.7 (*)     All other components within normal limits   COMPREHENSIVE METABOLIC PANEL - Abnormal; Notable for the following components:    Potassium 3.1 (*)     " Chloride 111 (*)     CO2 20 (*)     Glucose 127 (*)     Creatinine 0.47 (*)     Total Bilirubin 1.1 (*)     All other components within normal limits   LIPASE   POCT URINE PREGNANCY           All available results from the labs ordered were independently reviewed. with findings as follows:  CBC results unremarkable.  CMP results notable for mild hypokalemia and elevated chloride of 111.  Lipase unremarkable.  Urinalysis notable for ketones and pregnancy test unremarkable.     Imaging     Imaging Results    None                EKG        ED Management/Discussion     Medications   lactated ringers bolus 1,000 mL (0 mLs Intravenous Stopped 5/6/24 0758)   lactated ringers bolus 1,000 mL (0 mLs Intravenous Stopped 5/6/24 0623)   ondansetron injection 4 mg (4 mg Intravenous Given 5/6/24 0523)   promethazine (PHENERGAN) 12.5 mg in dextrose 5 % (D5W) 50 mL IVPB (0 mg Intravenous Stopped 5/6/24 0544)   potassium chloride SA CR tablet 20 mEq (20 mEq Oral Given 5/6/24 0641)                 The patient's list of active medical problems, social history, medications, and allergies as documented per RN staff has been reviewed.           Following discussion of all pertinent details presently available from the patient's encounter, the patient was transitioned into the care of Dr.S Alexander for ongoing evaluation and management.    Referrals:  No orders of the defined types were placed in this encounter.      CLINICAL IMPRESSION    ICD-10-CM ICD-9-CM   1. Nausea and vomiting, unspecified vomiting type  R11.2 787.01   2. Diarrhea, unspecified type  R19.7 787.91          ED Disposition Condition    Discharge Stable                 Rebel Chavez MD  05/06/24 2206

## 2024-05-06 NOTE — PROVIDER PROGRESS NOTES - EMERGENCY DEPT.
Encounter Date: 5/6/2024    ED Physician Progress Notes        Physician Note:   Assumed care of this patient from Dr. Chavez at shift change.  This 38-year-old female presenting with vomiting and diarrhea since last night.  Vital signs remained stable.  Patient's labs notable for mild dehydration.  Patient given IV fluids and antiemetics.  On re-evaluation, patient says she feels much better.  Repeat exam on exam is benign.  I think patient can be discharged home with symptomatic treatment.  She will follow up with the primary care doctor.  She also has appointment with her bariatric surgeon in the next couple weeks.  Return instructions given.

## 2024-05-06 NOTE — ED NOTES
22g PIV placed in Right Hand, patient tolerated well. Labs collected per policy, labeled and taken to lab. PIV flushes and draws well. LR bolus started, zofran and phenergan started per MAR.

## 2024-05-06 NOTE — ED NOTES
Assumed care of PT that presented to the ED with C/O lower abdominal cramping, diarrhea, N/V x yesterday. Reports gastric sleeve placed in March. Upon assessment PT has C/O 1/10 HA. +diarrhea. VSS. AAOx4.

## 2024-08-19 ENCOUNTER — HOSPITAL ENCOUNTER (EMERGENCY)
Facility: HOSPITAL | Age: 39
Discharge: HOME | End: 2024-08-19
Attending: EMERGENCY MEDICINE
Payer: MEDICAID

## 2024-08-19 VITALS
BODY MASS INDEX: 45.08 KG/M2 | DIASTOLIC BLOOD PRESSURE: 88 MMHG | SYSTOLIC BLOOD PRESSURE: 137 MMHG | RESPIRATION RATE: 18 BRPM | OXYGEN SATURATION: 100 % | HEART RATE: 80 BPM | HEIGHT: 62 IN | TEMPERATURE: 98 F | WEIGHT: 245 LBS

## 2024-08-19 DIAGNOSIS — R07.9 CHEST PAIN: ICD-10-CM

## 2024-08-19 LAB
ALBUMIN SERPL BCP-MCNC: 4.2 G/DL (ref 3.5–5.2)
ALP SERPL-CCNC: 88 U/L (ref 38–126)
ALT SERPL W/O P-5'-P-CCNC: 36 U/L (ref 10–44)
ANION GAP SERPL CALC-SCNC: 14 MMOL/L (ref 8–16)
AST SERPL-CCNC: 75 U/L (ref 15–46)
B-HCG UR QL: NEGATIVE
BASOPHILS # BLD AUTO: 0.05 K/UL (ref 0–0.2)
BASOPHILS NFR BLD: 0.5 % (ref 0–1.9)
BILIRUB SERPL-MCNC: 0.7 MG/DL (ref 0.1–1)
CALCIUM SERPL-MCNC: 9.6 MG/DL (ref 8.7–10.5)
CHLORIDE SERPL-SCNC: 109 MMOL/L (ref 95–110)
CO2 SERPL-SCNC: 20 MMOL/L (ref 23–29)
CREAT SERPL-MCNC: 0.63 MG/DL (ref 0.5–1.4)
CTP QC/QA: YES
DIFFERENTIAL METHOD BLD: ABNORMAL
EOSINOPHIL # BLD AUTO: 0.6 K/UL (ref 0–0.5)
EOSINOPHIL NFR BLD: 6.2 % (ref 0–8)
ERYTHROCYTE [DISTWIDTH] IN BLOOD BY AUTOMATED COUNT: 13.8 % (ref 11.5–14.5)
EST. GFR  (NO RACE VARIABLE): >60 ML/MIN/1.73 M^2
GLUCOSE SERPL-MCNC: 91 MG/DL (ref 70–110)
HCT VFR BLD AUTO: 43.2 % (ref 37–48.5)
HGB BLD-MCNC: 14.5 G/DL (ref 12–16)
IMM GRANULOCYTES # BLD AUTO: 0.02 K/UL (ref 0–0.04)
IMM GRANULOCYTES NFR BLD AUTO: 0.2 % (ref 0–0.5)
LYMPHOCYTES # BLD AUTO: 2.3 K/UL (ref 1–4.8)
LYMPHOCYTES NFR BLD: 22.5 % (ref 18–48)
MCH RBC QN AUTO: 31.1 PG (ref 27–31)
MCHC RBC AUTO-ENTMCNC: 33.6 G/DL (ref 32–36)
MCV RBC AUTO: 93 FL (ref 82–98)
MONOCYTES # BLD AUTO: 0.9 K/UL (ref 0.3–1)
MONOCYTES NFR BLD: 8.7 % (ref 4–15)
NEUTROPHILS # BLD AUTO: 6.3 K/UL (ref 1.8–7.7)
NEUTROPHILS NFR BLD: 61.9 % (ref 38–73)
NRBC BLD-RTO: 0 /100 WBC
PLATELET # BLD AUTO: 218 K/UL (ref 150–450)
PMV BLD AUTO: 12.9 FL (ref 9.2–12.9)
POTASSIUM SERPL-SCNC: 3.4 MMOL/L (ref 3.5–5.1)
PROT SERPL-MCNC: 7.2 G/DL (ref 6–8.4)
RBC # BLD AUTO: 4.66 M/UL (ref 4–5.4)
SODIUM SERPL-SCNC: 143 MMOL/L (ref 136–145)
TROPONIN I SERPL-MCNC: <0.012 NG/ML (ref 0.01–0.03)
TROPONIN I SERPL-MCNC: <0.012 NG/ML (ref 0.01–0.03)
UUN UR-MCNC: 16 MG/DL (ref 7–17)
WBC # BLD AUTO: 10.14 K/UL (ref 3.9–12.7)

## 2024-08-19 PROCEDURE — 99285 EMERGENCY DEPT VISIT HI MDM: CPT | Mod: 25,ER

## 2024-08-19 PROCEDURE — 84484 ASSAY OF TROPONIN QUANT: CPT | Mod: 91,ER | Performed by: EMERGENCY MEDICINE

## 2024-08-19 PROCEDURE — 80053 COMPREHEN METABOLIC PANEL: CPT | Mod: ER | Performed by: EMERGENCY MEDICINE

## 2024-08-19 PROCEDURE — 81025 URINE PREGNANCY TEST: CPT | Mod: ER | Performed by: EMERGENCY MEDICINE

## 2024-08-19 PROCEDURE — 25000003 PHARM REV CODE 250: Mod: ER | Performed by: EMERGENCY MEDICINE

## 2024-08-19 PROCEDURE — 85025 COMPLETE CBC W/AUTO DIFF WBC: CPT | Mod: ER | Performed by: EMERGENCY MEDICINE

## 2024-08-19 PROCEDURE — 93005 ELECTROCARDIOGRAM TRACING: CPT | Mod: ER

## 2024-08-19 PROCEDURE — 93010 ELECTROCARDIOGRAM REPORT: CPT | Mod: ,,, | Performed by: INTERNAL MEDICINE

## 2024-08-19 RX ORDER — IBUPROFEN 200 MG
1 CAPSULE ORAL DAILY
COMMUNITY

## 2024-08-19 RX ORDER — NAPROXEN SODIUM 220 MG/1
324 TABLET, FILM COATED ORAL
Status: COMPLETED | OUTPATIENT
Start: 2024-08-19 | End: 2024-08-19

## 2024-08-19 RX ADMIN — ASPIRIN 81 MG CHEWABLE TABLET 324 MG: 81 TABLET CHEWABLE at 06:08

## 2024-08-19 NOTE — ED PROVIDER NOTES
"ED Provider Note - 8/19/2024    History     Chief Complaint   Patient presents with    Chest Pain     Patient presents to ED with CP that feels like " tightness and pounding" she states it came on all of a sudden with some lightheadedness, hot flashes, and nausea; took 8mg Zofran PTA.      Patient currently presents with chief complaint of chest pain.  This began 2 hours ago.  This is localized to the substernal region of the chest.  Patient reports  SOB, nausea with emesis x 2, diaphoresis, and dizziness associated with this process and denies associated palpitations.  Patient's pain does not radiate.  Patient denies aspirin use in the last 24 hours. Risk factors for CAD include obesity.  Endorses no risk factors for VTE.  Patient denies leg swelling, history of VTE, immobilization, malignancy, prolonged travel, recent surgery, and recent trauma      Review of patient's allergies indicates:   Allergen Reactions    Nuts [tree nut] Anaphylaxis     [place] allergy, Peanut allergy    Sesame seed Anaphylaxis    Cat dander     Dog dander      Past Medical History:   Diagnosis Date    Allergy     Anxiety     BMI 50.0-59.9, adult     Female infertility 2/16/2017 9:50:56 AM    [facility] Historical - [facility]: Infertility, Unspecified-No Additional Notes    GERD (gastroesophageal reflux disease)     Hypoglycemic disorder     Obesity     Pure hypercholesterolemia 2/16/2017 9:50:50 AM    [facility] Historical - Unknown: High cholesterol-No Additional Notes    Thyroid disease     Vertigo      Past Surgical History:   Procedure Laterality Date    ADENOIDECTOMY      ARTHROSCOPIC CHONDROPLASTY OF KNEE JOINT Left 09/06/2019    Procedure: ARTHROSCOPY, KNEE, WITH CHONDROPLASTY;  Surgeon: Sravan Antonio MD;  Location: [facility] OR;  Service: Orthopedics;  Laterality: Left;    ARTHROSCOPIC DEBRIDEMENT OF SHOULDER Right 03/01/2019    Procedure: DEBRIDEMENT, SHOULDER, ARTHROSCOPIC;  Surgeon: Sravan Antonio MD;  Location: [facility] OR;  " Service: Orthopedics;  Laterality: Right;  labral debridement    ARTHROSCOPIC DEBRIDEMENT OF SHOULDER Right 10/16/2020    Procedure: DEBRIDEMENT, SHOULDER, ARTHROSCOPIC;  Surgeon: Sravan Antonio MD;  Location: [facility] OR;  Service: Orthopedics;  Laterality: Right;    ARTHROSCOPIC REPAIR OF ROTATOR CUFF OF SHOULDER Right 2019    Procedure: REPAIR, ROTATOR CUFF, ARTHROSCOPIC;  Surgeon: Sravan Antonio MD;  Location: [facility] OR;  Service: Orthopedics;  Laterality: Right;  subscapularis  Regeneten implant    ARTHROSCOPY OF KNEE Left 2019    Procedure: ARTHROSCOPY, KNEE;  Surgeon: Sravan Antonio MD;  Location: [facility] OR;  Service: Orthopedics;  Laterality: Left;  regional w/o catheter / adductor     ARTHROSCOPY OF SHOULDER WITH DECOMPRESSION OF SUBACROMIAL SPACE Right 2019    Procedure: ARTHROSCOPY, SHOULDER, WITH SUBACROMIAL SPACE DECOMPRESSION;  Surgeon: Sravan Antonio MD;  Location: [facility] OR;  Service: Orthopedics;  Laterality: Right;    ARTHROSCOPY OF SHOULDER WITH DECOMPRESSION OF SUBACROMIAL SPACE Right 10/16/2020    Procedure: ARTHROSCOPY, SHOULDER, WITH SUBACROMIAL SPACE DECOMPRESSION;  Surgeon: Sravan Antonio MD;  Location: [facility] OR;  Service: Orthopedics;  Laterality: Right;    ARTHROSCOPY OF SHOULDER WITH REMOVAL OF DISTAL CLAVICLE Right 10/16/2020    Procedure: ARTHROSCOPY, SHOULDER, WITH DISTAL CLAVICLE EXCISION;  Surgeon: Sravan Antonio MD;  Location: [facility] OR;  Service: Orthopedics;  Laterality: Right;  regional w/o catheter (interscalene)     SECTION      COLONOSCOPY N/A 2020    Procedure: COLONOSCOPY;  Surgeon: Erinn Brenner MD;  Location: [facility] ENDO;  Service: Endoscopy;  Laterality: N/A;    DILATION AND CURETTAGE OF UTERUS      ESOPHAGOGASTRODUODENOSCOPY N/A 2020    Procedure: EGD (ESOPHAGOGASTRODUODENOSCOPY);  Surgeon: Erinn Brenner MD;  Location: [facility] ENDO;  Service: Endoscopy;  Laterality: N/A;    GANGLION CYST EXCISION       LIPOMA RESECTION Right 2024    Right wait line    RECONSTRUCTION OF LIGAMENT Left 2019    Procedure: RECONSTRUCTION, LIGAMENT;  Surgeon: Sravan Antonio MD;  Location: [facility] OR;  Service: Orthopedics;  Laterality: Left;  ACL    REPAIR OF MENISCUS OF KNEE Left 2019    Procedure: REPAIR, MENISCUS, KNEE;  Surgeon: Sravan Antonio MD;  Location: [facility] OR;  Service: Orthopedics;  Laterality: Left;  lateral    TONSILLECTOMY       Family History   Problem Relation Name Age of Onset    Diabetes Mother Arianne     Hypertension Mother Arianne     Depression Mother Arianne     Achalasia Mother Arianne     Hypertension Father Paw erika     Heart disease Father Paw erika     Allergies Father Paw erika     Cancer Maternal Aunt Breast     Diabetes Paternal Grandfather Paw erika     Autism Brother      Allergies Son      Allergic rhinitis Neg Hx      Angioedema Neg Hx      Asthma Neg Hx      Atopy Neg Hx      Eczema Neg Hx      Immunodeficiency Neg Hx      Rhinitis Neg Hx      Urticaria Neg Hx       Social History     Tobacco Use    Smoking status: Former     Current packs/day: 0.00     Types: Cigarettes, Vaping with nicotine     Quit date: 2017     Years since quittin.4    Smokeless tobacco: Never   Substance Use Topics    Alcohol use: Not Currently     Comment: 2x a month    Drug use: No     Review of Systems   Constitutional:  Positive for diaphoresis. Negative for chills and fever.   HENT:  Negative for congestion and rhinorrhea.    Respiratory:  Negative for cough and shortness of breath.    Cardiovascular:  Positive for chest pain. Negative for palpitations.   Gastrointestinal:  Positive for nausea and vomiting. Negative for abdominal pain and diarrhea.   Genitourinary:  Negative for difficulty urinating and dysuria.   Skin:  Negative for color change and rash.   Neurological:  Positive for light-headedness.   Hematological:  Negative for adenopathy. Does not bruise/bleed easily.  "    Physical Exam     Initial Vitals   BP Pulse Resp Temp SpO2   08/19/24 1757 08/19/24 1757 08/19/24 1757 08/19/24 2207 08/19/24 1757   (!) 170/79 79 18 98.5 °F (36.9 °C) 98 %      MAP       --                Vitals:    08/19/24 1757 08/19/24 1900 08/19/24 2000 08/19/24 2207   BP: (!) 170/79 135/71 137/81 121/70   Pulse: 79 82 77 77   Resp: 18 17 20 16   Temp:    98.5 °F (36.9 °C)   TempSrc: Oral   Oral   SpO2: 98% 100% 100% 100%   Weight: 111.1 kg (245 lb)      Height: 5' 2" (1.575 m)       08/19/24 2316   BP: 137/88   Pulse: 80   Resp: 18   Temp: 98.2 °F (36.8 °C)   TempSrc:    SpO2: 100%   Weight:    Height:      Physical Exam    Nursing note and vitals reviewed.  Constitutional: She appears well-developed and well-nourished. She is not diaphoretic. No distress.   HENT:   Head: Normocephalic and atraumatic.   Nose: Nose normal.   Mouth/Throat: Oropharynx is clear and moist.   Eyes: Conjunctivae are normal. No scleral icterus.   Neck: Neck supple. No JVD present.   Cardiovascular:  Normal rate, regular rhythm, normal heart sounds and intact distal pulses.           Pulmonary/Chest: Breath sounds normal. No respiratory distress.   Abdominal: Abdomen is soft. She exhibits no distension. There is no abdominal tenderness.   Musculoskeletal:         General: No edema. Normal range of motion.      Cervical back: Neck supple.     Neurological: She is alert and oriented to person, place, and time. She has normal strength.   Skin: Skin is warm and dry.         ED Course   Procedures                   MDM  Differential Diagnoses   Based on available history, the working differential diagnoses considered during this evaluation include but are not limited to acute coronary syndrome, pulmonary embolus, esophageal perforation, aortic dissection, pneumonia, and pneumothorax as well as musculoskeletal sources including chest wall strain and costochondritis and GI sources such as esophageal spasm and GERD..      LABS     Labs " Reviewed   CBC W/ AUTO DIFFERENTIAL - Abnormal       Result Value    WBC 10.14      RBC 4.66      Hemoglobin 14.5      Hematocrit 43.2      MCV 93      MCH 31.1 (*)     MCHC 33.6      RDW 13.8      Platelets 218      MPV 12.9      Immature Granulocytes 0.2      Gran # (ANC) 6.3      Immature Grans (Abs) 0.02      Lymph # 2.3      Mono # 0.9      Eos # 0.6 (*)     Baso # 0.05      nRBC 0      Gran % 61.9      Lymph % 22.5      Mono % 8.7      Eosinophil % 6.2      Basophil % 0.5      Differential Method Automated     COMPREHENSIVE METABOLIC PANEL - Abnormal    Sodium 143      Potassium 3.4 (*)     Chloride 109      CO2 20 (*)     Glucose 91      BUN 16      Creatinine 0.63      Calcium 9.6      Total Protein 7.2      Albumin 4.2      Total Bilirubin 0.7      Alkaline Phosphatase 88      AST 75 (*)     ALT 36      Anion Gap 14      eGFR >60.0     TROPONIN I    Troponin I <0.012     TROPONIN I    Troponin I <0.012     POCT URINE PREGNANCY    POC Preg Test, Ur Negative       Acceptable Yes             All available results from the labs ordered were independently reviewed. with findings as follows:  CBC unremarkable.  CMP unremarkable.  Pregnancy test negative.  Troponin levels at arrival and at the 3 hour follow-up both normal     Imaging     Imaging Results              X-Ray Chest AP Portable (Final result)  Result time 08/19/24 18:50:42      Final result by Roxanne Corona MD (08/19/24 18:50:42)                   Impression:      Crowding or increased central vascular/interstitial markings with underinflation      Electronically signed by: Roxanne Corona  Date:    08/19/2024  Time:    18:50               Narrative:    EXAMINATION:  XR CHEST AP PORTABLE    CLINICAL HISTORY:  Chest Pain;    TECHNIQUE:  Single frontal portable view of the chest was performed.    COMPARISON:  October 2008    FINDINGS:  In lungs are underinflated.  No sizable consolidation or pleural effusion                                        X-Rays:   Independently Interpreted Readings:   Chest X-Ray: Normal heart size.  No infiltrates.  No acute abnormalities.        EKG   EKG Readings: (Independently Interpreted)   Initial Reading: No STEMI. Rhythm: Normal Sinus Rhythm. Heart Rate: 86. Ectopy: No Ectopy. Conduction: Normal. T Waves Flipped: III. Axis: Normal.     ED Management/Discussion     Medications   aspirin chewable tablet 324 mg (324 mg Oral Given 8/19/24 1850)                 The patient's list of active medical problems, social history, medications, and allergies as documented per RN staff has been reviewed.           Given the patient's low risk for MACE based on a carefully determined HEART score (<4) and overall clinical judgement; unremarkable cardiac monitoring and troponin levels drawn at the time of arrival and again >3 hours later have been used to effectively rule out ACS.  Additionally, I have no considerable suspicion for aortic dissection/aneurysm, esophageal perforation, pneumothorax or acute pulmonary complications based on the presentation, exam, lab results, and imaging.    On final assessment, the patient appears suitable for discharge.  I see no indication of an emergent process beyond that addressed during our encounter but have duly counseled the patient/family regarding the need for prompt follow-up as well as the indications that should prompt immediate return to the emergency room.  The patient/family has been provided with language -specific verbal and printed direction regarding our final diagnosis(es) as well as instructions regarding use of OTC and/or Rx medications intended to manage the patient's aforementioned conditions including:  ED Prescriptions    None           Patient has been advised of the following recommended follow-up instructions:  Follow-up Information       Follow up With Specialties Details Why Contact Irvin Henderson MD Family Medicine Schedule an appointment as soon as  possible for a visit  for reassessment [address] [place] [address] [phone]      [facility] - Emergency Dept Emergency Medicine Go to  As needed, If symptoms worsen [address] [facility]  Emergency Department  [facility] [id number] [phone]          The patient/family communicates understanding of all this information and all remaining questions and concerns were addressed at this time.      Referrals:  No orders of the defined types were placed in this encounter.      CLINICAL IMPRESSION    ICD-10-CM ICD-9-CM   1. Chest pain  R07.9 786.50          ED Disposition Condition    Discharge Stable                 Rebel Chavez MD  08/20/24 0800

## 2024-08-20 LAB
OHS QRS DURATION: 86 MS
OHS QTC CALCULATION: 445 MS

## 2024-08-29 ENCOUNTER — HOSPITAL ENCOUNTER (EMERGENCY)
Facility: HOSPITAL | Age: 39
Discharge: HOME | End: 2024-08-29
Attending: EMERGENCY MEDICINE
Payer: MEDICAID

## 2024-08-29 VITALS
BODY MASS INDEX: 43.79 KG/M2 | RESPIRATION RATE: 12 BRPM | TEMPERATURE: 98 F | DIASTOLIC BLOOD PRESSURE: 72 MMHG | OXYGEN SATURATION: 97 % | WEIGHT: 238 LBS | HEART RATE: 79 BPM | HEIGHT: 62 IN | SYSTOLIC BLOOD PRESSURE: 114 MMHG

## 2024-08-29 DIAGNOSIS — R10.13 EPIGASTRIC PAIN: ICD-10-CM

## 2024-08-29 DIAGNOSIS — K80.20 CALCULUS OF GALLBLADDER WITHOUT CHOLECYSTITIS WITHOUT OBSTRUCTION: ICD-10-CM

## 2024-08-29 DIAGNOSIS — K80.50 BILIARY COLIC: Primary | ICD-10-CM

## 2024-08-29 DIAGNOSIS — N30.01 ACUTE CYSTITIS WITH HEMATURIA: ICD-10-CM

## 2024-08-29 LAB
ALBUMIN SERPL BCP-MCNC: 4.2 G/DL (ref 3.5–5.2)
ALP SERPL-CCNC: 106 U/L (ref 55–135)
ALT SERPL W/O P-5'-P-CCNC: 31 U/L (ref 10–44)
ANION GAP SERPL CALC-SCNC: 13 MMOL/L (ref 8–16)
AST SERPL-CCNC: 22 U/L (ref 10–40)
BACTERIA #/AREA URNS HPF: ABNORMAL /HPF
BASOPHILS # BLD AUTO: 0.03 K/UL (ref 0–0.2)
BASOPHILS NFR BLD: 0.4 % (ref 0–1.9)
BILIRUB SERPL-MCNC: 0.9 MG/DL (ref 0.1–1)
BILIRUB UR QL STRIP: NEGATIVE
BNP SERPL-MCNC: <10 PG/ML (ref 0–99)
BUN SERPL-MCNC: 11 MG/DL (ref 6–20)
CALCIUM SERPL-MCNC: 10.1 MG/DL (ref 8.7–10.5)
CHLORIDE SERPL-SCNC: 109 MMOL/L (ref 95–110)
CLARITY UR: ABNORMAL
CO2 SERPL-SCNC: 19 MMOL/L (ref 23–29)
COLOR UR: YELLOW
CREAT SERPL-MCNC: 0.8 MG/DL (ref 0.5–1.4)
DIFFERENTIAL METHOD BLD: ABNORMAL
EOSINOPHIL # BLD AUTO: 0.4 K/UL (ref 0–0.5)
EOSINOPHIL NFR BLD: 5.3 % (ref 0–8)
ERYTHROCYTE [DISTWIDTH] IN BLOOD BY AUTOMATED COUNT: 13.2 % (ref 11.5–14.5)
EST. GFR  (NO RACE VARIABLE): >60 ML/MIN/1.73 M^2
GLUCOSE SERPL-MCNC: 88 MG/DL (ref 70–110)
GLUCOSE UR QL STRIP: NEGATIVE
HCT VFR BLD AUTO: 44 % (ref 37–48.5)
HGB BLD-MCNC: 15.1 G/DL (ref 12–16)
HGB UR QL STRIP: NEGATIVE
IMM GRANULOCYTES # BLD AUTO: 0.02 K/UL (ref 0–0.04)
IMM GRANULOCYTES NFR BLD AUTO: 0.3 % (ref 0–0.5)
KETONES UR QL STRIP: ABNORMAL
LEUKOCYTE ESTERASE UR QL STRIP: ABNORMAL
LIPASE SERPL-CCNC: 30 U/L (ref 4–60)
LYMPHOCYTES # BLD AUTO: 2.1 K/UL (ref 1–4.8)
LYMPHOCYTES NFR BLD: 29 % (ref 18–48)
MCH RBC QN AUTO: 31.2 PG (ref 27–31)
MCHC RBC AUTO-ENTMCNC: 34.3 G/DL (ref 32–36)
MCV RBC AUTO: 91 FL (ref 82–98)
MICROSCOPIC COMMENT: ABNORMAL
MONOCYTES # BLD AUTO: 0.7 K/UL (ref 0.3–1)
MONOCYTES NFR BLD: 8.9 % (ref 4–15)
NEUTROPHILS # BLD AUTO: 4.1 K/UL (ref 1.8–7.7)
NEUTROPHILS NFR BLD: 56.1 % (ref 38–73)
NITRITE UR QL STRIP: NEGATIVE
NON-SQ EPI CELLS #/AREA URNS HPF: 2 /HPF
NRBC BLD-RTO: 0 /100 WBC
OHS QRS DURATION: 82 MS
OHS QTC CALCULATION: 429 MS
PH UR STRIP: 5 [PH] (ref 5–8)
PLATELET # BLD AUTO: 259 K/UL (ref 150–450)
PMV BLD AUTO: 12.2 FL (ref 9.2–12.9)
POTASSIUM SERPL-SCNC: 4 MMOL/L (ref 3.5–5.1)
PROT SERPL-MCNC: 7.8 G/DL (ref 6–8.4)
PROT UR QL STRIP: ABNORMAL
RBC # BLD AUTO: 4.84 M/UL (ref 4–5.4)
RBC #/AREA URNS HPF: 4 /HPF (ref 0–4)
SODIUM SERPL-SCNC: 141 MMOL/L (ref 136–145)
SP GR UR STRIP: 1.02 (ref 1–1.03)
SQUAMOUS #/AREA URNS HPF: 8 /HPF
TROPONIN I SERPL DL<=0.01 NG/ML-MCNC: <0.006 NG/ML (ref 0–0.03)
URN SPEC COLLECT METH UR: ABNORMAL
UROBILINOGEN UR STRIP-ACNC: NEGATIVE EU/DL
WBC # BLD AUTO: 7.34 K/UL (ref 3.9–12.7)
WBC #/AREA URNS HPF: 12 /HPF (ref 0–5)

## 2024-08-29 PROCEDURE — 81000 URINALYSIS NONAUTO W/SCOPE: CPT | Performed by: PHYSICIAN ASSISTANT

## 2024-08-29 PROCEDURE — 96365 THER/PROPH/DIAG IV INF INIT: CPT

## 2024-08-29 PROCEDURE — 84484 ASSAY OF TROPONIN QUANT: CPT | Performed by: PHYSICIAN ASSISTANT

## 2024-08-29 PROCEDURE — 80053 COMPREHEN METABOLIC PANEL: CPT | Performed by: PHYSICIAN ASSISTANT

## 2024-08-29 PROCEDURE — 93005 ELECTROCARDIOGRAM TRACING: CPT

## 2024-08-29 PROCEDURE — 96361 HYDRATE IV INFUSION ADD-ON: CPT

## 2024-08-29 PROCEDURE — 99285 EMERGENCY DEPT VISIT HI MDM: CPT | Mod: 25

## 2024-08-29 PROCEDURE — 96375 TX/PRO/DX INJ NEW DRUG ADDON: CPT

## 2024-08-29 PROCEDURE — 25000003 PHARM REV CODE 250: Performed by: PHYSICIAN ASSISTANT

## 2024-08-29 PROCEDURE — 85025 COMPLETE CBC W/AUTO DIFF WBC: CPT | Performed by: PHYSICIAN ASSISTANT

## 2024-08-29 PROCEDURE — 87086 URINE CULTURE/COLONY COUNT: CPT | Performed by: PHYSICIAN ASSISTANT

## 2024-08-29 PROCEDURE — 93010 ELECTROCARDIOGRAM REPORT: CPT | Mod: ,,, | Performed by: INTERNAL MEDICINE

## 2024-08-29 PROCEDURE — 83880 ASSAY OF NATRIURETIC PEPTIDE: CPT | Performed by: PHYSICIAN ASSISTANT

## 2024-08-29 PROCEDURE — 83690 ASSAY OF LIPASE: CPT | Performed by: PHYSICIAN ASSISTANT

## 2024-08-29 PROCEDURE — 63600175 PHARM REV CODE 636 W HCPCS: Performed by: PHYSICIAN ASSISTANT

## 2024-08-29 RX ORDER — HYDROCODONE BITARTRATE AND ACETAMINOPHEN 10; 325 MG/1; MG/1
1 TABLET ORAL EVERY 6 HOURS PRN
Qty: 12 TABLET | Refills: 0 | Status: SHIPPED | OUTPATIENT
Start: 2024-08-29 | End: 2024-09-01

## 2024-08-29 RX ORDER — MORPHINE SULFATE 4 MG/ML
4 INJECTION, SOLUTION INTRAMUSCULAR; INTRAVENOUS
Status: COMPLETED | OUTPATIENT
Start: 2024-08-29 | End: 2024-08-29

## 2024-08-29 RX ORDER — ONDANSETRON 4 MG/1
4 TABLET, ORALLY DISINTEGRATING ORAL EVERY 6 HOURS PRN
Qty: 30 TABLET | Refills: 0 | Status: SHIPPED | OUTPATIENT
Start: 2024-08-29

## 2024-08-29 RX ORDER — ONDANSETRON HYDROCHLORIDE 2 MG/ML
4 INJECTION, SOLUTION INTRAVENOUS
Status: COMPLETED | OUTPATIENT
Start: 2024-08-29 | End: 2024-08-29

## 2024-08-29 RX ORDER — PANTOPRAZOLE SODIUM 40 MG/1
40 TABLET, DELAYED RELEASE ORAL EVERY MORNING
COMMUNITY

## 2024-08-29 RX ORDER — FAMOTIDINE 40 MG/1
40 TABLET, FILM COATED ORAL NIGHTLY
COMMUNITY
Start: 2024-08-12

## 2024-08-29 RX ORDER — CEPHALEXIN 500 MG/1
500 CAPSULE ORAL EVERY 12 HOURS
Qty: 14 CAPSULE | Refills: 0 | Status: SHIPPED | OUTPATIENT
Start: 2024-08-29 | End: 2024-09-05

## 2024-08-29 RX ORDER — KETOROLAC TROMETHAMINE 30 MG/ML
15 INJECTION, SOLUTION INTRAMUSCULAR; INTRAVENOUS
Status: COMPLETED | OUTPATIENT
Start: 2024-08-29 | End: 2024-08-29

## 2024-08-29 RX ORDER — MULTIVIT-MIN/IRON/FOLIC ACID/K 45-800-120
1 CAPSULE ORAL DAILY
COMMUNITY

## 2024-08-29 RX ADMIN — KETOROLAC TROMETHAMINE 15 MG: 30 INJECTION, SOLUTION INTRAMUSCULAR; INTRAVENOUS at 09:08

## 2024-08-29 RX ADMIN — ONDANSETRON 4 MG: 2 INJECTION INTRAMUSCULAR; INTRAVENOUS at 09:08

## 2024-08-29 RX ADMIN — MORPHINE SULFATE 4 MG: 4 INJECTION INTRAVENOUS at 11:08

## 2024-08-29 RX ADMIN — SODIUM CHLORIDE 1000 ML: 9 INJECTION, SOLUTION INTRAVENOUS at 09:08

## 2024-08-29 RX ADMIN — CEFTRIAXONE SODIUM 1 G: 1 INJECTION, POWDER, FOR SOLUTION INTRAMUSCULAR; INTRAVENOUS at 11:08

## 2024-08-29 NOTE — ED PROVIDER NOTES
"Encounter Date: 8/29/2024       History     Chief Complaint   Patient presents with    Abdominal Pain     Pt recent dx of gallstones with RUQ ABD pain and N/V. Reports vomiting after every meal.      39-year-old female returns to ED with concern of epigastric/right upper quadrant abdominal pain that she states began over 1 week ago.  Seen at outside facility 1 week ago with chief complaint at that time of chest pain with grossly unremarkable cardiac workup.  She states she has since followed up with her PCP and had after side ultrasound performed of gallbladder that noted multiple gallstones.  She reports she has continued have pain, nausea, vomiting and limited ability to tolerate p.o. that is exacerbated after meals.  Previous abdominal surgery includes bariatric surgery.  She does report she continues have associated midsternal chest pain and "feeling short of breath".  No other acute complaints at this time    The history is provided by the patient and medical records.     Review of patient's allergies indicates:   Allergen Reactions    Nuts [tree nut] Anaphylaxis     [place] allergy, Peanut allergy    Sesame seed Anaphylaxis    Cat dander     Dog dander      Past Medical History:   Diagnosis Date    Allergy     Anxiety     BMI 50.0-59.9, adult     Female infertility 2/16/2017 9:50:56 AM    [place] [facility]: Infertility, Unspecified-No Additional Notes    GERD (gastroesophageal reflux disease)     Hypoglycemic disorder     Obesity     Pure hypercholesterolemia 2/16/2017 9:50:50 AM    [place] [facility] - Unknown: High cholesterol-No Additional Notes    Thyroid disease     Vertigo      Past Surgical History:   Procedure Laterality Date    ADENOIDECTOMY      ARTHROSCOPIC CHONDROPLASTY OF KNEE JOINT Left 09/06/2019    Procedure: ARTHROSCOPY, KNEE, WITH CHONDROPLASTY;  Surgeon: Sravan Antonio MD;  Location: [facility];  Service: Orthopedics;  Laterality: Left;    ARTHROSCOPIC DEBRIDEMENT OF " SHOULDER Right 2019    Procedure: DEBRIDEMENT, SHOULDER, ARTHROSCOPIC;  Surgeon: Sravan Antonio MD;  Location: [facility] OR;  Service: Orthopedics;  Laterality: Right;  labral debridement    ARTHROSCOPIC DEBRIDEMENT OF SHOULDER Right 10/16/2020    Procedure: DEBRIDEMENT, SHOULDER, ARTHROSCOPIC;  Surgeon: Sravan Antonio MD;  Location: [facility] OR;  Service: Orthopedics;  Laterality: Right;    ARTHROSCOPIC REPAIR OF ROTATOR CUFF OF SHOULDER Right 2019    Procedure: REPAIR, ROTATOR CUFF, ARTHROSCOPIC;  Surgeon: Sravan Antonio MD;  Location: [facility] OR;  Service: Orthopedics;  Laterality: Right;  subscapularis  Regeneten implant    ARTHROSCOPY OF KNEE Left 2019    Procedure: ARTHROSCOPY, KNEE;  Surgeon: Sravan Antonio MD;  Location: [facility] OR;  Service: Orthopedics;  Laterality: Left;  regional w/o catheter / adductor     ARTHROSCOPY OF SHOULDER WITH DECOMPRESSION OF SUBACROMIAL SPACE Right 2019    Procedure: ARTHROSCOPY, SHOULDER, WITH SUBACROMIAL SPACE DECOMPRESSION;  Surgeon: Sravan Antonio MD;  Location: [facility] OR;  Service: Orthopedics;  Laterality: Right;    ARTHROSCOPY OF SHOULDER WITH DECOMPRESSION OF SUBACROMIAL SPACE Right 10/16/2020    Procedure: ARTHROSCOPY, SHOULDER, WITH SUBACROMIAL SPACE DECOMPRESSION;  Surgeon: Sravan Antonio MD;  Location: [facility] OR;  Service: Orthopedics;  Laterality: Right;    ARTHROSCOPY OF SHOULDER WITH REMOVAL OF DISTAL CLAVICLE Right 10/16/2020    Procedure: ARTHROSCOPY, SHOULDER, WITH DISTAL CLAVICLE EXCISION;  Surgeon: Sravan Antonio MD;  Location: [facility] OR;  Service: Orthopedics;  Laterality: Right;  regional w/o catheter (interscalene)     SECTION      COLONOSCOPY N/A 2020    Procedure: COLONOSCOPY;  Surgeon: Erinn Brenner MD;  Location: [facility] ENDO;  Service: Endoscopy;  Laterality: N/A;    DILATION AND CURETTAGE OF UTERUS      ESOPHAGOGASTRODUODENOSCOPY N/A 2020    Procedure: EGD  (ESOPHAGOGASTRODUODENOSCOPY);  Surgeon: Erinn Brenner MD;  Location: [facility] ENDO;  Service: Endoscopy;  Laterality: N/A;    GANGLION CYST EXCISION      GASTRIC RESTRICTION SURGERY  2024    LIPOMA RESECTION Right 2024    Right wait line    RECONSTRUCTION OF LIGAMENT Left 2019    Procedure: RECONSTRUCTION, LIGAMENT;  Surgeon: Sravan Antonio MD;  Location: [facility] OR;  Service: Orthopedics;  Laterality: Left;  ACL    REPAIR OF MENISCUS OF KNEE Left 2019    Procedure: REPAIR, MENISCUS, KNEE;  Surgeon: Sravan Antonio MD;  Location: [facility] OR;  Service: Orthopedics;  Laterality: Left;  lateral    TONSILLECTOMY       Family History   Problem Relation Name Age of Onset    Diabetes Mother Arianne     Hypertension Mother Arianne     Depression Mother Arianne     Achalasia Mother Arianne     Hypertension Father Paw erika     Heart disease Father Paw erika     Allergies Father Paw erika     Cancer Maternal Aunt Breast     Diabetes Paternal Grandfather Paw erika     Autism Brother      Allergies Son      Allergic rhinitis Neg Hx      Angioedema Neg Hx      Asthma Neg Hx      Atopy Neg Hx      Eczema Neg Hx      Immunodeficiency Neg Hx      Rhinitis Neg Hx      Urticaria Neg Hx       Social History     Tobacco Use    Smoking status: Former     Current packs/day: 0.00     Types: Cigarettes, Vaping with nicotine     Quit date: 2017     Years since quittin.5    Smokeless tobacco: Never   Substance Use Topics    Alcohol use: Not Currently     Comment: 2x a month    Drug use: No     Review of Systems   Constitutional:  Negative for chills and fever.   Respiratory:  Positive for shortness of breath. Negative for cough.    Cardiovascular:  Positive for chest pain.   Gastrointestinal:  Positive for abdominal pain, constipation, nausea and vomiting. Negative for diarrhea.   Genitourinary:  Negative for dysuria.       Physical Exam     Initial Vitals [24 0810]   BP Pulse Resp Temp SpO2    130/78 95 20 98.5 °F (36.9 °C) 100 %      MAP       --         Physical Exam    Vitals reviewed.  Constitutional: Vital signs are normal. She appears well-developed and well-nourished. She is active and cooperative. She does not have a sickly appearance. She does not appear ill. No distress.   HENT:   Head: Normocephalic and atraumatic.   Eyes: EOM are normal.   Neck:   Normal range of motion.  Cardiovascular:  Normal rate and regular rhythm.           Pulmonary/Chest: Effort normal and breath sounds normal.   Abdominal: There is abdominal tenderness in the right upper quadrant and epigastric area.   Epigastric and right upper quadrant tenderness noted on exam.  No guarding.   Musculoskeletal:      Cervical back: Normal range of motion.     Neurological: She is alert and oriented to person, place, and time. GCS eye subscore is 4. GCS verbal subscore is 5. GCS motor subscore is 6.   Skin: Skin is warm and dry.   Psychiatric: She has a normal mood and affect. Her speech is normal and behavior is normal.         ED Course   Procedures  Labs Reviewed   CBC W/ AUTO DIFFERENTIAL - Abnormal       Result Value    WBC 7.34      RBC 4.84      Hemoglobin 15.1      Hematocrit 44.0      MCV 91      MCH 31.2 (*)     MCHC 34.3      RDW 13.2      Platelets 259      MPV 12.2      Immature Granulocytes 0.3      Gran # (ANC) 4.1      Immature Grans (Abs) 0.02      Lymph # 2.1      Mono # 0.7      Eos # 0.4      Baso # 0.03      nRBC 0      Gran % 56.1      Lymph % 29.0      Mono % 8.9      Eosinophil % 5.3      Basophil % 0.4      Differential Method Automated     COMPREHENSIVE METABOLIC PANEL - Abnormal    Sodium 141      Potassium 4.0      Chloride 109      CO2 19 (*)     Glucose 88      BUN 11      Creatinine 0.8      Calcium 10.1      Total Protein 7.8      Albumin 4.2      Total Bilirubin 0.9      Alkaline Phosphatase 106      AST 22      ALT 31      eGFR >60      Anion Gap 13     URINALYSIS, REFLEX TO URINE CULTURE - Abnormal     Specimen UA Urine, Clean Catch      Color, UA Yellow      Appearance, UA Hazy (*)     pH, UA 5.0      Specific Gravity, UA 1.025      Protein, UA Trace (*)     Glucose, UA Negative      Ketones, UA 1+ (*)     Bilirubin (UA) Negative      Occult Blood UA Negative      Nitrite, UA Negative      Urobilinogen, UA Negative      Leukocytes, UA 3+ (*)     Narrative:     Specimen Source->Urine   URINALYSIS MICROSCOPIC - Abnormal    RBC, UA 4      WBC, UA 12 (*)     Bacteria Moderate (*)     Squam Epithel, UA 8      Non-Squam Epith 2 (*)     Microscopic Comment SEE COMMENT      Narrative:     Specimen Source->Urine   CULTURE, URINE   LIPASE    Lipase 30     B-TYPE NATRIURETIC PEPTIDE    BNP <10     TROPONIN I    Troponin I <0.006          ECG Results              EKG 12-lead (Final result)        Collection Time Result Time QRS Duration OHS QTC Calculation    08/29/24 08:34:16 08/29/24 09:06:10 82 429                     Final result by Interface, Lab In [facility] (08/29/24 09:06:16)                   Narrative:    Test Reason : R10.13,    Vent. Rate : 076 BPM     Atrial Rate : 076 BPM     P-R Int : 126 ms          QRS Dur : 082 ms      QT Int : 382 ms       P-R-T Axes : 000 072 004 degrees     QTc Int : 429 ms    Normal sinus rhythm  Normal ECG  When compared with ECG of 19-AUG-2024 18:01,  No significant change was found  Confirmed by Александр Rodriguez MD ([id number]) on 8/29/2024 9:06:07 AM    Referred By: [email or web address]   SELF           Confirmed By:Александр Rodriguez MD                                  Imaging Results              X-Ray Chest 1 View (Final result)  Result time 08/29/24 10:13:23      Final result by Everett López MD (08/29/24 10:13:23)                   Impression:      No acute process.  Mild prominence of the pulmonary vasculature similar to prior exam.      Electronically signed by: Everett López MD  Date:    08/29/2024  Time:    10:13               Narrative:    EXAMINATION:  XR CHEST 1  VIEW    CLINICAL HISTORY:  Epigastric pain    TECHNIQUE:  Single frontal view of the chest was performed.    COMPARISON:  Chest radiograph from 08/19/2024.    FINDINGS:  Cardiac silhouette is not enlarged.  Mild prominence of the pulmonary vasculature.  Lungs are clear.  No pleural effusion.  No pneumothorax.  No evidence of acute fracture.                                       US Abdomen Limited (Final result)  Result time 08/29/24 10:08:23      Final result by Everett López MD (08/29/24 10:08:23)                   Impression:      Cholelithiasis with reported tenderness in the right upper quadrant.  No gallbladder wall hyperemia or pericholecystic fluid.  No convincing evidence of acute cholecystitis.  If clinical concerns persist, consider HIDA scan for further evaluation.    Findings suggestive of hepatic steatosis.      Electronically signed by: Everett López MD  Date:    08/29/2024  Time:    10:08               Narrative:    EXAMINATION:  US ABDOMEN LIMITED    CLINICAL HISTORY:  RUQ/epigastric pain;.    TECHNIQUE:  Limited ultrasound of the right upper quadrant of the abdomen including pancreas, liver, gallbladder, common bile duct was performed.    COMPARISON:  Ultrasound from 07/11/2022.    FINDINGS:  Liver: Normal in size, measuring 14.4 cm. Mild diffuse increased echogenicity suggestive of hepatic steatosis.  No focal hepatic lesions.    Gallbladder: Multiple gallstones are seen.  There is no gallbladder wall thickening or pericholecystic fluid.  Reported tenderness in the right upper quadrant.    Biliary system: The common duct is not dilated, measuring 5 mm.  No intrahepatic ductal dilatation.    Spleen: Normal in size with a homogeneous echotexture, measuring 8.7 x 4.1 cm.    Pancreas: The visualized portions of pancreas appear normal.    Miscellaneous: No ascites.                                       Medications   sodium chloride 0.9% bolus 1,000 mL 1,000 mL (1,000 mLs Intravenous New Bag  8/29/24 0957)   ondansetron injection 4 mg (4 mg Intravenous Given 8/29/24 0951)   ketorolac injection 15 mg (15 mg Intravenous Given 8/29/24 0951)   morphine injection 4 mg (4 mg Intravenous Given 8/29/24 1116)   cefTRIAXone (Rocephin) 1 g in D5W 100 mL IVPB (MB+) (1 g Intravenous New Bag 8/29/24 1147)     Medical Decision Making  Patient presents with chief complaint of right upper quadrant and abdominal pain that began 1 week ago with nausea and vomiting.  Afebrile with vitals grossly unremarkable.  Patient appears to be in discomfort but no significant distress on exam    DDx:  Including but not limited to GERD, intestinal spasm, gastroenteritis, gastritis, ulcer, cholecystitis, cholelithiasis, gallstones, pancreatitis, ileus, small bowel obstruction, appendicitis, diverticulitis, colitis, constipation, intestinal gas pain, less likely ACS    Amount and/or Complexity of Data Reviewed  Labs: ordered. Decision-making details documented in ED Course.  Radiology: ordered. Decision-making details documented in ED Course.  ECG/medicine tests: ordered.     Details: EKG NSR, rate 76, no ST elevation.  No STEMI.  No acute ischemic changes compared to recent EKG.  EKG reviewed by attending, Dr. Shell    Risk  Prescription drug management.               ED Course as of 08/29/24 1229   Thu Aug 29, 2024   1003 CBC W/ AUTO DIFFERENTIAL(!)  No elevation WBC [KS]   1003 Brain natriuretic peptide  WNL [KS]   1003 Troponin I  WNL [KS]   1117 Urinalysis Microscopic(!)  Noting 12 WBC with moderate bacteria.  Urine sent to culture.  Will give dose of Rocephin in ED. [KS]   1118 Comp. Metabolic Panel(!)  Grossly unremarkable.  Specifically no significant evidence bilirubin or LFTs [KS]   1118 US Abdomen Limited  Abdominal ultrasound per Radiology review:  Cholelithiasis with reported tenderness in the right upper quadrant.  No gallbladder wall hyperemia or pericholecystic fluid.  No convincing evidence of acute cholecystitis.  If  clinical concerns persist, consider HIDA scan for further evaluation. [KS]   1118 Patient discussed with general surgeon resident, Dr. Augustine, who agrees patient's presenting symptoms are likely secondary to biliary colic.  He will review patient's chart and return call to ED for plan. [KS]   1118 X-Ray Chest 1 View  No acute findings per Radiology review [KS]   1119 Plan was discussed with patient, who reports her pain symptoms are worsening.  Will give additional pain medication. [KS]   1223 Lipase  WNL [KS]   1224 Patient was further discussed with general surgery, who states there is no current indication for emergent surgical intervention at this time and patient is stable for close outpatient follow-up.  Plan was discussed with patient.  She does report she has follow up with her general surgeon early next week.  Will continue with supportive care.  Prescription written for Norco, Zofran along with prescription for Keflex for UTI coverage.  We will continue to monitor urine culture results.  Encouraged to continue advancing diet as tolerated with close outpatient follow-up and high ED return precautions.  Patient states her understanding and agrees with plan. [KS]      ED Course User Index  [KS] Eric Bey PA-C                           Clinical Impression:  Final diagnoses:  [R10.13] Epigastric pain  [K80.50] Biliary colic (Primary)  [K80.20] Calculus of gallbladder without cholecystitis without obstruction  [N30.01] Acute cystitis with hematuria          ED Disposition Condition    Discharge Stable          ED Prescriptions       Medication Sig Dispense Start Date End Date Auth. Provider    HYDROcodone-acetaminophen (NORCO)  mg per tablet Take 1 tablet by mouth every 6 (six) hours as needed for Pain. 12 tablet 8/29/2024 9/1/2024 Eric Bey PA-C    ondansetron (ZOFRAN-ODT) 4 MG TbDL Take 1 tablet (4 mg total) by mouth every 6 (six) hours as needed (nausea). 30 tablet 8/29/2024 -- Maida  NATHAN Reyes    cephALEXin (KEFLEX) 500 MG capsule Take 1 capsule (500 mg total) by mouth every 12 (twelve) hours. for 7 days 14 capsule 8/29/2024 9/5/2024 Eric Bey PA-C          Follow-up Information       Follow up With Specialties Details Why Contact Info    Irvin Baumann MD Family Medicine   [address] [place] [address] [place]  [facility] [id number] [phone]      Your general surgeon                 Eric Bey PA-C  08/29/24 2080

## 2024-08-29 NOTE — DISCHARGE INSTRUCTIONS

## 2024-08-29 NOTE — ED NOTES
Patient sitting up in bed and talkative. Reporting pain improved but new onset of heartburn. PA notified.

## 2024-08-29 NOTE — ED NOTES
Patient arrives to the ED w/ complaints of RUQ and epigastric abdominal pain. Describes pain as stabbing and 7 out of 10. Reports epigastric pain radiates to right shoulder. Denies SOB. Reports N/V following meals, but states this is baseline since gastric sleeve placement in 3/2024. Reports constipation. LBM Monday 8/26.  Reports gallstone Dx. Reports she is scheduled for gallbladder removal surgery on 9/4.

## 2024-08-29 NOTE — PHARMACY MED REC
"[facility]           Pharmacy  Admission Medication History     The home medication history was taken by Yoly Oneill.      Medication history obtained from Medications listed below were obtained from: Patient/family    Based on information gathered for medication list, you may go to "Admission" then "Reconcile Home Medications" tabs to review and/or act upon those items.     The home medication list has been updated by the Pharmacy department.   Please read ALL comments highlighted in yellow.   Please address this information as you see fit.    Feel free to contact us if you have any questions or require assistance.    The medications listed below were removed from the home medication list.  Please reorder if appropriate:    Patient reports NOT TAKING the following medication(s):  Albuterol hfa  Symbicort 160-4.5mcg  Valtrex 1000mg  Atarax 25mg      Current Facility-Administered Medications on File Prior to Encounter   Medication Dose Route Frequency Provider Last Rate Last Admin    0.9%  NaCl infusion   Intravenous Continuous Erinn Brenner MD   Stopped at 10/16/20 0920    sodium chloride 0.9% flush 10 mL  10 mL Intravenous PRN Erinn Brenner MD         Current Outpatient Medications on File Prior to Encounter   Medication Sig Dispense Refill    ALPRAZolam (XANAX) 0.5 MG tablet Take 0.5 mg by mouth nightly as needed.      calcium citrate (CALCITRATE) 200 mg (950 mg) tablet Take 1 tablet by mouth once daily.      famotidine (PEPCID) 40 MG tablet Take 40 mg by mouth every evening.      medroxyPROGESTERone (DEPO-PROVERA) 150 mg/mL Syrg Inject 1 mL (150 mg total) into the muscle every 3 (three) months. 1 mL 4    multivitamin-min-iron-FA-vit K (BARIATRIC MULTIVITAMINS) 45 mg iron- 800 mcg-120 mcg Cap Take 1 tablet by mouth once daily.      pantoprazole (PROTONIX) 40 MG tablet Take 40 mg by mouth every morning.      EPINEPHrine (EPIPEN 2-NICHOLAS) 0.3 mg/0.3 mL AtIn Inject 0.3 mLs (0.3 mg " total) into the muscle once. for 1 dose 1 each 11       Please address this information as you see fit.  Feel free to contact us if you have any questions or require assistance.    Yoly Oneill  [phone]                  .

## 2024-08-30 LAB
BACTERIA UR CULT: NORMAL
BACTERIA UR CULT: NORMAL

## 2025-01-15 ENCOUNTER — PATIENT MESSAGE (OUTPATIENT)
Dept: ORTHOPEDICS | Facility: CLINIC | Age: 40
End: 2025-01-15
Payer: MEDICAID

## 2025-02-04 DIAGNOSIS — G89.29 CHRONIC PAIN OF LEFT KNEE: Primary | ICD-10-CM

## 2025-02-04 DIAGNOSIS — M25.562 CHRONIC PAIN OF LEFT KNEE: Primary | ICD-10-CM

## 2025-02-06 ENCOUNTER — OFFICE VISIT (OUTPATIENT)
Dept: ORTHOPEDICS | Facility: CLINIC | Age: 40
End: 2025-02-06
Payer: MEDICAID

## 2025-02-06 VITALS — BODY MASS INDEX: 43.82 KG/M2 | WEIGHT: 238.13 LBS | HEIGHT: 62 IN

## 2025-02-06 DIAGNOSIS — S43.422S SPRAIN OF LEFT ROTATOR CUFF CAPSULE, SEQUELA: Primary | ICD-10-CM

## 2025-02-06 DIAGNOSIS — M75.102 TEAR OF LEFT ROTATOR CUFF, UNSPECIFIED TEAR EXTENT, UNSPECIFIED WHETHER TRAUMATIC: ICD-10-CM

## 2025-02-06 PROCEDURE — 99212 OFFICE O/P EST SF 10 MIN: CPT | Mod: PBBFAC,PN | Performed by: SURGERY

## 2025-02-06 PROCEDURE — 99214 OFFICE O/P EST MOD 30 MIN: CPT | Mod: S$PBB,,, | Performed by: SURGERY

## 2025-02-06 PROCEDURE — 99999 PR PBB SHADOW E&M-EST. PATIENT-LVL II: CPT | Mod: PBBFAC,,, | Performed by: SURGERY

## 2025-02-06 PROCEDURE — 3008F BODY MASS INDEX DOCD: CPT | Mod: CPTII,,, | Performed by: SURGERY

## 2025-02-06 RX ORDER — BUSPIRONE HYDROCHLORIDE 10 MG/1
10 TABLET ORAL 3 TIMES DAILY
COMMUNITY

## 2025-02-06 RX ORDER — FLUOXETINE 10 MG/1
10 CAPSULE ORAL DAILY
COMMUNITY

## 2025-02-06 NOTE — PROGRESS NOTES
Subjective:     Junewally Reyes     Chief Complaint   Patient presents with    Left Knee - Pain    Left Shoulder - Pain       HPI  39-year-old female who presents today with A long history of left shoulder pain.    She has previously tried injections and previously tried physical therapy for greater than 3 months.  These have not helped her pain.  She also has a previous ACL reconstruction done by Dr. Antonio roughly 5-1/2 years ago.  Joint instability?  Negative  Mechanical locking/clicking?  Negative  Affecting ADL's?  Yes  Affecting sleep?  Yes      ROS    PAST MEDICAL HISTORY:   Past Medical History:   Diagnosis Date    Allergy     Anxiety     BMI 50.0-59.9, adult     Female infertility 2/16/2017 9:50:56 AM    [facility] Historical - [facility]: Infertility, Unspecified-No Additional Notes    GERD (gastroesophageal reflux disease)     Hypoglycemic disorder     Obesity     Pure hypercholesterolemia 2/16/2017 9:50:50 AM    [facility] Historical - Unknown: High cholesterol-No Additional Notes    Thyroid disease     Vertigo      PAST SURGICAL HISTORY:   Past Surgical History:   Procedure Laterality Date    ADENOIDECTOMY      ARTHROSCOPIC CHONDROPLASTY OF KNEE JOINT Left 09/06/2019    Procedure: ARTHROSCOPY, KNEE, WITH CHONDROPLASTY;  Surgeon: Sravan Antonio MD;  Location: [facility] OR;  Service: Orthopedics;  Laterality: Left;    ARTHROSCOPIC DEBRIDEMENT OF SHOULDER Right 03/01/2019    Procedure: DEBRIDEMENT, SHOULDER, ARTHROSCOPIC;  Surgeon: Sravan Antonio MD;  Location: [facility] OR;  Service: Orthopedics;  Laterality: Right;  labral debridement    ARTHROSCOPIC DEBRIDEMENT OF SHOULDER Right 10/16/2020    Procedure: DEBRIDEMENT, SHOULDER, ARTHROSCOPIC;  Surgeon: Sravan Antonio MD;  Location: [facility] OR;  Service: Orthopedics;  Laterality: Right;    ARTHROSCOPIC REPAIR OF ROTATOR CUFF OF SHOULDER Right 03/01/2019    Procedure: REPAIR, ROTATOR CUFF, ARTHROSCOPIC;  Surgeon: Sravan Antonio MD;  Location: [facility]  OR;  Service: Orthopedics;  Laterality: Right;  subscapularis  Regeneten implant    ARTHROSCOPY OF KNEE Left 2019    Procedure: ARTHROSCOPY, KNEE;  Surgeon: Sravan Antonio MD;  Location: [facility] OR;  Service: Orthopedics;  Laterality: Left;  regional w/o catheter / adductor     ARTHROSCOPY OF SHOULDER WITH DECOMPRESSION OF SUBACROMIAL SPACE Right 2019    Procedure: ARTHROSCOPY, SHOULDER, WITH SUBACROMIAL SPACE DECOMPRESSION;  Surgeon: Sravan Antonio MD;  Location: [facility] OR;  Service: Orthopedics;  Laterality: Right;    ARTHROSCOPY OF SHOULDER WITH DECOMPRESSION OF SUBACROMIAL SPACE Right 10/16/2020    Procedure: ARTHROSCOPY, SHOULDER, WITH SUBACROMIAL SPACE DECOMPRESSION;  Surgeon: Sravan Antonio MD;  Location: [facility] OR;  Service: Orthopedics;  Laterality: Right;    ARTHROSCOPY OF SHOULDER WITH REMOVAL OF DISTAL CLAVICLE Right 10/16/2020    Procedure: ARTHROSCOPY, SHOULDER, WITH DISTAL CLAVICLE EXCISION;  Surgeon: Sravan Antonio MD;  Location: [facility] OR;  Service: Orthopedics;  Laterality: Right;  regional w/o catheter (interscalene)     SECTION      COLONOSCOPY N/A 2020    Procedure: COLONOSCOPY;  Surgeon: Erinn Brenner MD;  Location: [facility] ENDO;  Service: Endoscopy;  Laterality: N/A;    DILATION AND CURETTAGE OF UTERUS      ESOPHAGOGASTRODUODENOSCOPY N/A 2020    Procedure: EGD (ESOPHAGOGASTRODUODENOSCOPY);  Surgeon: Erinn Brenner MD;  Location: [facility] ENDO;  Service: Endoscopy;  Laterality: N/A;    GANGLION CYST EXCISION      GASTRIC RESTRICTION SURGERY  2024    LIPOMA RESECTION Right 2024    Right wait line    RECONSTRUCTION OF LIGAMENT Left 2019    Procedure: RECONSTRUCTION, LIGAMENT;  Surgeon: Sravan Antonio MD;  Location: [facility] OR;  Service: Orthopedics;  Laterality: Left;  ACL    REPAIR OF MENISCUS OF KNEE Left 2019    Procedure: REPAIR, MENISCUS, KNEE;  Surgeon: Sravan Antonio MD;  Location: [facility] OR;  Service:  Orthopedics;  Laterality: Left;  lateral    TONSILLECTOMY       FAMILY HISTORY:   Family History   Problem Relation Name Age of Onset    Diabetes Mother Arianne     Hypertension Mother Arianne     Depression Mother Arianne     Achalasia Mother Arianne     Hypertension Father Paw erika     Heart disease Father Paw erika     Allergies Father Paw erika     Cancer Maternal Aunt Breast     Diabetes Paternal Grandfather Paw erika     Autism Brother      Allergies Son      Allergic rhinitis Neg Hx      Angioedema Neg Hx      Asthma Neg Hx      Atopy Neg Hx      Eczema Neg Hx      Immunodeficiency Neg Hx      Rhinitis Neg Hx      Urticaria Neg Hx       SOCIAL HISTORY:   Social History     Socioeconomic History    Marital status: Single   Tobacco Use    Smoking status: Former     Current packs/day: 0.00     Types: Cigarettes, Vaping with nicotine     Quit date: 2017     Years since quittin.9    Smokeless tobacco: Never   Substance and Sexual Activity    Alcohol use: Not Currently     Comment: 2x a month    Drug use: No    Sexual activity: Not Currently     Partners: Male     Birth control/protection: None       MEDICATIONS:   Current Outpatient Medications:     ALPRAZolam (XANAX) 0.5 MG tablet, Take 0.5 mg by mouth nightly as needed., Disp: , Rfl:     busPIRone (BUSPAR) 10 MG tablet, Take 10 mg by mouth 3 (three) times daily., Disp: , Rfl:     calcium citrate (CALCITRATE) 200 mg (950 mg) tablet, Take 1 tablet by mouth once daily., Disp: , Rfl:     FLUoxetine 10 MG capsule, Take 10 mg by mouth once daily., Disp: , Rfl:     medroxyPROGESTERone (DEPO-PROVERA) 150 mg/mL Syrg, Inject 1 mL (150 mg total) into the muscle every 3 (three) months., Disp: 1 mL, Rfl: 4    multivitamin-min-iron-FA-vit K (BARIATRIC MULTIVITAMINS) 45 mg iron- 800 mcg-120 mcg Cap, Take 1 tablet by mouth once daily., Disp: , Rfl:     ondansetron (ZOFRAN-ODT) 4 MG TbDL, Take 1 tablet (4 mg total) by mouth every 6 (six) hours as needed (nausea).,  "Disp: 30 tablet, Rfl: 0    pantoprazole (PROTONIX) 40 MG tablet, Take 40 mg by mouth every morning., Disp: , Rfl:     EPINEPHrine (EPIPEN 2-NICHOLAS) 0.3 mg/0.3 mL AtIn, Inject 0.3 mLs (0.3 mg total) into the muscle once. for 1 dose, Disp: 1 each, Rfl: 11    famotidine (PEPCID) 40 MG tablet, Take 40 mg by mouth every evening., Disp: , Rfl:   No current facility-administered medications for this visit.    Facility-Administered Medications Ordered in Other Visits:     0.9%  NaCl infusion, , Intravenous, Continuous, Erinn Brenner MD, Stopped at 10/16/20 0920    sodium chloride 0.9% flush 10 mL, 10 mL, Intravenous, PRN, Erinn Brenner MD  ALLERGIES:   Review of patient's allergies indicates:   Allergen Reactions    Nuts [tree nut] Anaphylaxis     Freedom allergy, Peanut allergy    Sesame seed Anaphylaxis    Cat dander     Dog dander        Objective:     VITAL SIGNS: Ht 5' 2" (1.575 m)   Wt 108 kg (238 lb 1.6 oz)   BMI 43.55 kg/m²    Ortho/SPM Exam    MUSCULOSKELETAL EXAM  Shoulder: left SHOULDER  The affected shoulder is compared to the contralateral shoulder.  Positive findings otherwise noted at the bottom of the exam.    Observation:      SHOULDER  No ecchymosis, edema, or erythema throughout the shoulder girdle.  No sternal, clavicular, or acromial deformities bilaterally.  No atrophy of the pectorals, deltoids, supraspinatus, infraspinatus, or biceps bilaterally.  No asymmetry of shoulders bilaterally.    ROM (* = with pain):  CERVICAL SPINE  Full AROM in flexion, extension, sidebending, and rotation.    SHOULDER  Active flexion to 180° on left and 180° on right.  Active abduction to 180° on left and 180° on right.  Active internal rotation to T7 on left and T7 on right.    Active external rotation to T4 on left and T4 on right.    No scapular dyskinesia or winging.    Tenderness:  No tenderness at the SC or AC joint  No tenderness over the clavicle   No tenderness over biceps tendon or bicipital " groove  No tenderness over subacromial space    Strength Testing (* = with pain):  Deltoid - 5/5 on left and 5/5 on right  Biceps - 5/5 on left and 5/5 on right  Triceps - 5/5 on left and 5/5 on right  Wrist extension - 5/5 on left and 5/5 on right  Wrist flexion - 5/5 on left and 5/5 on right   - 5/5 on left and 5/5 on right  Finger extension - 5/5 on left and 5/5 on right  Finger abduction - 5/5 on left and 5/5 on right    Special Tests:  Empty can test - negative  Full can test - negative  Bear hug test - negative  Belly press test - negative  Resisted internal rotation - negative  Resisted external rotation - negative    Neer's test - negative  Hawkin's-Dillon test - negative  Cross-arm test- negative    OThads test - negative  Biceps load 1 test - negative  Biceps load 2 test - negative  Murray sheer test - negative    Sulcus sign - none  AP load and shift laxity - none  Anterior apprehension test - negative  Posterior apprehension test - negative    Speed's test - negative  Yergason's test - negative    Neurovascular Exam:  Spurlings test - negative bialterally  Lhermittes test - negative  2+ radial pulses bilaterally  Sensation intact to light touch in the distal median, radial, and ulnar nerve distributions bilaterally.  Negative Tinel's at carpal tunnel  Negative Tinel's at cubital tunnel  2+/4 reflexes at triceps, biceps, and brachioradialis  Capillary refill intact <2 seconds in all digits bilaterally    Full range of motion of the cervical/thoracic spine in all planes without pain    Positive findings:  Positive Magda's test, positive empty can, positive rotator cuff testing, AC joint pain and positive cross-body pain.    IMAGING:    X- rays of the shoulder (AP, Scapular Y, Bernageau, and axillary views) taken today.  X-ray images were reviewed personally by me and then directly with patient.  No fracture or dislocation.  MRI from 2022 shows rotator cuff tendinopathy    Assessment:      Encounter  Diagnoses   Name Primary?    Sprain of left rotator cuff capsule, sequela Yes    Tear of left rotator cuff, unspecified tear extent, unspecified whether traumatic           Plan:   Given her previous physical therapy and home exercises as well as injection we will order a new MRI of the left shoulder.  Follow up after MRI.    She states she is doing okay with the knee at this time, she has been told she may require surgery in the future however at this point she would like to hold off.    Patient agreeable to today's plan and all questions were answered    This note is dictated using the M*Modal Fluency Direct word recognition program. There are word recognition mistakes that are occasionally missed on review.

## 2025-02-11 ENCOUNTER — HOSPITAL ENCOUNTER (OUTPATIENT)
Dept: RADIOLOGY | Facility: HOSPITAL | Age: 40
Discharge: HOME | End: 2025-02-11
Attending: SURGERY
Payer: MEDICAID

## 2025-02-11 DIAGNOSIS — S43.422S SPRAIN OF LEFT ROTATOR CUFF CAPSULE, SEQUELA: ICD-10-CM

## 2025-02-11 PROCEDURE — 73221 MRI JOINT UPR EXTREM W/O DYE: CPT | Mod: TC,PN,LT

## 2025-02-19 ENCOUNTER — PATIENT MESSAGE (OUTPATIENT)
Dept: ORTHOPEDICS | Facility: CLINIC | Age: 40
End: 2025-02-19
Payer: MEDICAID

## 2025-03-11 ENCOUNTER — PATIENT MESSAGE (OUTPATIENT)
Dept: ORTHOPEDICS | Facility: CLINIC | Age: 40
End: 2025-03-11
Payer: MEDICAID

## 2025-03-12 ENCOUNTER — PATIENT MESSAGE (OUTPATIENT)
Dept: ORTHOPEDICS | Facility: CLINIC | Age: 40
End: 2025-03-12
Payer: MEDICAID

## 2025-03-28 ENCOUNTER — OFFICE VISIT (OUTPATIENT)
Dept: ORTHOPEDICS | Facility: CLINIC | Age: 40
End: 2025-03-28
Payer: MEDICAID

## 2025-03-28 DIAGNOSIS — S43.422S SPRAIN OF LEFT ROTATOR CUFF CAPSULE, SEQUELA: Primary | ICD-10-CM

## 2025-03-28 NOTE — PROGRESS NOTES
Audio Only Telehealth Visit     The patient location is:  Audio  The chief complaint leading to consultation is:  Follow up from MRI  Visit type: Virtual visit with audio only (telephone)  Total time spent in medical discussion with patient:  5 minutes  Total time spent on date of the encounter:  12 minutes       The reason for the audio only service rather than synchronous audio and video virtual visit was related to technical difficulties or patient preference/necessity.       Each patient to whom I provide medical services by telemedicine is:  (1) informed of the relationship between the physician and patient and the respective role of any other health care provider with respect to management of the patient; and (2) notified that they may decline to receive medical services by telemedicine and may withdraw from such care at any time. Patient verbally consented to receive this service via voice-only telephone call.       HPI:  We reviewed her MRI findings with the patient.  No evidence for surgical intervention at this time.  She will follow up with us in 6 weeks for further management.     Assessment and plan:  Follow up for in-person visit in 6 weeks.                        This service was not originating from a related E/M service provided within the previous 7 days nor will  to an E/M service or procedure within the next 24 hours or my soonest available appointment.  Prevailing standard of care was able to be met in this audio-only visit.